# Patient Record
Sex: FEMALE | Race: OTHER | HISPANIC OR LATINO | URBAN - METROPOLITAN AREA
[De-identification: names, ages, dates, MRNs, and addresses within clinical notes are randomized per-mention and may not be internally consistent; named-entity substitution may affect disease eponyms.]

---

## 2018-07-21 ENCOUNTER — EMERGENCY (EMERGENCY)
Facility: HOSPITAL | Age: 38
LOS: 1 days | Discharge: HOME | End: 2018-07-21
Attending: EMERGENCY MEDICINE
Payer: MEDICAID

## 2018-07-21 VITALS
DIASTOLIC BLOOD PRESSURE: 67 MMHG | TEMPERATURE: 98 F | RESPIRATION RATE: 18 BRPM | HEART RATE: 78 BPM | SYSTOLIC BLOOD PRESSURE: 110 MMHG | OXYGEN SATURATION: 100 %

## 2018-07-21 VITALS
RESPIRATION RATE: 16 BRPM | HEIGHT: 62 IN | OXYGEN SATURATION: 98 % | DIASTOLIC BLOOD PRESSURE: 78 MMHG | HEART RATE: 78 BPM | WEIGHT: 145.06 LBS | TEMPERATURE: 98 F | SYSTOLIC BLOOD PRESSURE: 104 MMHG

## 2018-07-21 LAB
ALBUMIN SERPL ELPH-MCNC: 3.6 G/DL — SIGNIFICANT CHANGE UP (ref 3.5–5)
ALP SERPL-CCNC: 69 U/L — SIGNIFICANT CHANGE UP (ref 40–120)
ALT FLD-CCNC: 28 U/L DA — SIGNIFICANT CHANGE UP (ref 10–60)
ANION GAP SERPL CALC-SCNC: 6 MMOL/L — SIGNIFICANT CHANGE UP (ref 5–17)
AST SERPL-CCNC: 18 U/L — SIGNIFICANT CHANGE UP (ref 10–40)
BILIRUB SERPL-MCNC: 1.3 MG/DL — HIGH (ref 0.2–1.2)
BUN SERPL-MCNC: 15 MG/DL — SIGNIFICANT CHANGE UP (ref 7–18)
CALCIUM SERPL-MCNC: 8.9 MG/DL — SIGNIFICANT CHANGE UP (ref 8.4–10.5)
CHLORIDE SERPL-SCNC: 108 MMOL/L — SIGNIFICANT CHANGE UP (ref 96–108)
CO2 SERPL-SCNC: 26 MMOL/L — SIGNIFICANT CHANGE UP (ref 22–31)
CREAT SERPL-MCNC: 0.73 MG/DL — SIGNIFICANT CHANGE UP (ref 0.5–1.3)
GLUCOSE SERPL-MCNC: 90 MG/DL — SIGNIFICANT CHANGE UP (ref 70–99)
HCT VFR BLD CALC: 43.6 % — SIGNIFICANT CHANGE UP (ref 34.5–45)
HGB BLD-MCNC: 14.4 G/DL — SIGNIFICANT CHANGE UP (ref 11.5–15.5)
MCHC RBC-ENTMCNC: 30.6 PG — SIGNIFICANT CHANGE UP (ref 27–34)
MCHC RBC-ENTMCNC: 33 GM/DL — SIGNIFICANT CHANGE UP (ref 32–36)
MCV RBC AUTO: 92.5 FL — SIGNIFICANT CHANGE UP (ref 80–100)
PLATELET # BLD AUTO: 218 K/UL — SIGNIFICANT CHANGE UP (ref 150–400)
POTASSIUM SERPL-MCNC: 4.5 MMOL/L — SIGNIFICANT CHANGE UP (ref 3.5–5.3)
POTASSIUM SERPL-SCNC: 4.5 MMOL/L — SIGNIFICANT CHANGE UP (ref 3.5–5.3)
PROT SERPL-MCNC: 7.3 G/DL — SIGNIFICANT CHANGE UP (ref 6–8.3)
RBC # BLD: 4.72 M/UL — SIGNIFICANT CHANGE UP (ref 3.8–5.2)
RBC # FLD: 11.9 % — SIGNIFICANT CHANGE UP (ref 10.3–14.5)
SODIUM SERPL-SCNC: 140 MMOL/L — SIGNIFICANT CHANGE UP (ref 135–145)
WBC # BLD: 7.1 K/UL — SIGNIFICANT CHANGE UP (ref 3.8–10.5)
WBC # FLD AUTO: 7.1 K/UL — SIGNIFICANT CHANGE UP (ref 3.8–10.5)

## 2018-07-21 PROCEDURE — 80053 COMPREHEN METABOLIC PANEL: CPT

## 2018-07-21 PROCEDURE — 99284 EMERGENCY DEPT VISIT MOD MDM: CPT | Mod: 25

## 2018-07-21 PROCEDURE — 99284 EMERGENCY DEPT VISIT MOD MDM: CPT

## 2018-07-21 PROCEDURE — 70480 CT ORBIT/EAR/FOSSA W/O DYE: CPT | Mod: 26

## 2018-07-21 PROCEDURE — 70480 CT ORBIT/EAR/FOSSA W/O DYE: CPT

## 2018-07-21 PROCEDURE — 85027 COMPLETE CBC AUTOMATED: CPT

## 2018-07-21 PROCEDURE — 36415 COLL VENOUS BLD VENIPUNCTURE: CPT

## 2018-07-21 RX ORDER — KETOTIFEN FUMARATE 0.34 MG/ML
2 SOLUTION OPHTHALMIC
Qty: 0 | Refills: 0 | Status: DISCONTINUED | OUTPATIENT
Start: 2018-07-21 | End: 2018-07-25

## 2018-07-21 RX ORDER — ERYTHROMYCIN BASE 5 MG/GRAM
1 OINTMENT (GRAM) OPHTHALMIC (EYE) ONCE
Qty: 0 | Refills: 0 | Status: COMPLETED | OUTPATIENT
Start: 2018-07-21 | End: 2018-07-21

## 2018-07-21 RX ORDER — OFLOXACIN 0.3 %
1 DROPS OPHTHALMIC (EYE)
Qty: 1 | Refills: 0 | OUTPATIENT
Start: 2018-07-21 | End: 2018-07-25

## 2018-07-21 RX ADMIN — Medication 1 APPLICATION(S): at 11:21

## 2018-07-21 NOTE — ED PROVIDER NOTE - MEDICAL DECISION MAKING DETAILS
will treat for conjunctivitis and refer to outpatient ophthalmology appointment in next 3 days for follow up/continuity

## 2018-07-21 NOTE — ED PROVIDER NOTE - OBJECTIVE STATEMENT
note being written on behalf of Dr. Villa, as per her dictation   37 yo female presents with redeye x 3 days. She states she has red eye and crust since this morning. She denies changes in vision or double vision. She has no foreign body or pain with moving her eyes and no trauma. She denies fever. She states the symptoms are improving gradually but she just wants to make sure it is nothing to be concerned about.

## 2023-12-04 ENCOUNTER — EMERGENCY (EMERGENCY)
Facility: HOSPITAL | Age: 43
LOS: 1 days | Discharge: HOME | End: 2023-12-04
Attending: EMERGENCY MEDICINE
Payer: MEDICAID

## 2023-12-04 VITALS
SYSTOLIC BLOOD PRESSURE: 114 MMHG | WEIGHT: 165.35 LBS | HEIGHT: 61.42 IN | TEMPERATURE: 98 F | RESPIRATION RATE: 18 BRPM | OXYGEN SATURATION: 98 % | HEART RATE: 88 BPM | DIASTOLIC BLOOD PRESSURE: 79 MMHG

## 2023-12-04 PROCEDURE — 99284 EMERGENCY DEPT VISIT MOD MDM: CPT

## 2023-12-04 RX ORDER — OXYCODONE AND ACETAMINOPHEN 5; 325 MG/1; MG/1
1 TABLET ORAL ONCE
Refills: 0 | Status: DISCONTINUED | OUTPATIENT
Start: 2023-12-04 | End: 2023-12-04

## 2023-12-04 RX ORDER — IBUPROFEN 200 MG
600 TABLET ORAL ONCE
Refills: 0 | Status: COMPLETED | OUTPATIENT
Start: 2023-12-04 | End: 2023-12-04

## 2023-12-04 RX ORDER — LIDOCAINE 4 G/100G
1 CREAM TOPICAL ONCE
Refills: 0 | Status: COMPLETED | OUTPATIENT
Start: 2023-12-04 | End: 2023-12-04

## 2023-12-05 LAB
HCG UR QL: NEGATIVE — SIGNIFICANT CHANGE UP
HCG UR QL: NEGATIVE — SIGNIFICANT CHANGE UP

## 2023-12-05 PROCEDURE — 99283 EMERGENCY DEPT VISIT LOW MDM: CPT | Mod: 25

## 2023-12-05 PROCEDURE — 71046 X-RAY EXAM CHEST 2 VIEWS: CPT | Mod: 26

## 2023-12-05 PROCEDURE — 81025 URINE PREGNANCY TEST: CPT

## 2023-12-05 PROCEDURE — 71046 X-RAY EXAM CHEST 2 VIEWS: CPT

## 2023-12-05 RX ADMIN — Medication 600 MILLIGRAM(S): at 01:00

## 2023-12-05 RX ADMIN — LIDOCAINE 1 PATCH: 4 CREAM TOPICAL at 00:55

## 2023-12-05 RX ADMIN — Medication 600 MILLIGRAM(S): at 00:55

## 2023-12-05 NOTE — ED PROVIDER NOTE - CLINICAL SUMMARY MEDICAL DECISION MAKING FREE TEXT BOX
43-year-old female with right-sided rib pain status post slip and fall 3 days ago  Not hypoxic, afebrile  Well-appearing, mild right lower rib tenderness without crepitus, bilateral breath sounds present  Abdomen soft nontender    History and exam consistent with contusion  No fractures on x-ray  I am not concerned about any traumatic hemorrhage to patient's left lungs there is no trauma there and the mechanism is relatively low force  Supportive care and DC with outpatient follow-up  Discussed indication for patient return to ED.  Patient understood.

## 2023-12-05 NOTE — ED PROVIDER NOTE - PHYSICAL EXAMINATION
GENERAL: well appearing, no acute distress   HEAD: atraumatic   EYES: EOMI   ENT: moist oral mucosa   CARDIAC: regular rate; mild R lower anterior R ttp w/o crepitus   RESPIRATORY: no increased work of breathing, b/l BS present  MUSCULOSKELETAL: no deformity   NEUROLOGICAL: alert, spontaneous movement of extremities   SKIN: no visible rash  PSYCHIATRIC: cooperative

## 2023-12-05 NOTE — ED PROVIDER NOTE - PATIENT PORTAL LINK FT
You can access the FollowMyHealth Patient Portal offered by [facility] by registering at the following website: [email or web address]. By joining [employer]’s FollowMyHealth portal, you will also be able to view your health information using other applications (apps) compatible with our system. You can access the FollowMyHealth Patient Portal offered by [facility] by registering at the following website: [email or web address]. By joining [employer]’s FollowMyHealth portal, you will also be able to view your health information using other applications (apps) compatible with our system.

## 2023-12-05 NOTE — ED PROVIDER NOTE - OBJECTIVE STATEMENT
43-year-old female past medical history of thyroid disease presents for evaluation of possibly abnormal chest x-ray at urgent care just prior to arrival.  Patient states she fell in the bathtub 3 days ago and landed on her right side and has been having right rib pain.  The x-ray urgent care reportedly showed an infiltrate on the left side and the PA was concerned about possible traumatic hemorrhage.  Denies other acute complaints.  Translation via family at bedside.

## 2024-07-03 ENCOUNTER — INPATIENT (INPATIENT)
Facility: HOSPITAL | Age: 44
LOS: 13 days | Discharge: HOME | DRG: 645 | End: 2024-07-17
Attending: NEUROLOGICAL SURGERY | Admitting: NEUROLOGICAL SURGERY
Payer: COMMERCIAL

## 2024-07-03 ENCOUNTER — EMERGENCY (EMERGENCY)
Facility: HOSPITAL | Age: 44
LOS: 1 days | Discharge: HOME | End: 2024-07-03
Attending: EMERGENCY MEDICINE
Payer: MEDICAID

## 2024-07-03 VITALS
HEIGHT: 65 IN | WEIGHT: 169.76 LBS | RESPIRATION RATE: 18 BRPM | TEMPERATURE: 98 F | SYSTOLIC BLOOD PRESSURE: 119 MMHG | DIASTOLIC BLOOD PRESSURE: 80 MMHG | OXYGEN SATURATION: 96 % | HEART RATE: 83 BPM

## 2024-07-03 VITALS
TEMPERATURE: 98 F | OXYGEN SATURATION: 98 % | RESPIRATION RATE: 16 BRPM | HEART RATE: 58 BPM | SYSTOLIC BLOOD PRESSURE: 128 MMHG | DIASTOLIC BLOOD PRESSURE: 77 MMHG

## 2024-07-03 LAB
ALBUMIN SERPL ELPH-MCNC: 3.9 G/DL — SIGNIFICANT CHANGE UP (ref 3.5–5)
ALP SERPL-CCNC: 105 U/L — SIGNIFICANT CHANGE UP (ref 40–120)
ALT FLD-CCNC: 86 U/L DA — HIGH (ref 10–60)
ANION GAP SERPL CALC-SCNC: 2 MMOL/L — LOW (ref 5–17)
APTT BLD: 30 SEC — SIGNIFICANT CHANGE UP (ref 24.5–35.6)
AST SERPL-CCNC: 43 U/L — HIGH (ref 10–40)
BASOPHILS # BLD AUTO: 0.08 K/UL — SIGNIFICANT CHANGE UP (ref 0–0.2)
BASOPHILS NFR BLD AUTO: 1 % — SIGNIFICANT CHANGE UP (ref 0–2)
BILIRUB SERPL-MCNC: 0.7 MG/DL — SIGNIFICANT CHANGE UP (ref 0.2–1.2)
BUN SERPL-MCNC: 21 MG/DL — HIGH (ref 7–18)
CALCIUM SERPL-MCNC: 9.2 MG/DL — SIGNIFICANT CHANGE UP (ref 8.4–10.5)
CHLORIDE SERPL-SCNC: 111 MMOL/L — HIGH (ref 96–108)
CO2 SERPL-SCNC: 29 MMOL/L — SIGNIFICANT CHANGE UP (ref 22–31)
CREAT SERPL-MCNC: 1.1 MG/DL — SIGNIFICANT CHANGE UP (ref 0.5–1.3)
EGFR: 63 ML/MIN/1.73M2 — SIGNIFICANT CHANGE UP
EGFR: 63 ML/MIN/1.73M2 — SIGNIFICANT CHANGE UP
EOSINOPHIL # BLD AUTO: 0.64 K/UL — HIGH (ref 0–0.5)
EOSINOPHIL NFR BLD AUTO: 8.2 % — HIGH (ref 0–6)
GLUCOSE SERPL-MCNC: 93 MG/DL — SIGNIFICANT CHANGE UP (ref 70–99)
HCG SERPL-ACNC: <1 MIU/ML — SIGNIFICANT CHANGE UP
HCT VFR BLD CALC: 41.4 % — SIGNIFICANT CHANGE UP (ref 34.5–45)
HGB BLD-MCNC: 13.7 G/DL — SIGNIFICANT CHANGE UP (ref 11.5–15.5)
IMM GRANULOCYTES NFR BLD AUTO: 0.3 % — SIGNIFICANT CHANGE UP (ref 0–0.9)
INR BLD: 0.86 RATIO — SIGNIFICANT CHANGE UP (ref 0.85–1.18)
LYMPHOCYTES # BLD AUTO: 2.78 K/UL — SIGNIFICANT CHANGE UP (ref 1–3.3)
LYMPHOCYTES # BLD AUTO: 35.5 % — SIGNIFICANT CHANGE UP (ref 13–44)
MAGNESIUM SERPL-MCNC: 2.1 MG/DL — SIGNIFICANT CHANGE UP (ref 1.6–2.6)
MCHC RBC-ENTMCNC: 29.5 PG — SIGNIFICANT CHANGE UP (ref 27–34)
MCHC RBC-ENTMCNC: 33.1 GM/DL — SIGNIFICANT CHANGE UP (ref 32–36)
MCV RBC AUTO: 89.2 FL — SIGNIFICANT CHANGE UP (ref 80–100)
MONOCYTES # BLD AUTO: 0.81 K/UL — SIGNIFICANT CHANGE UP (ref 0–0.9)
MONOCYTES NFR BLD AUTO: 10.3 % — SIGNIFICANT CHANGE UP (ref 2–14)
NEUTROPHILS # BLD AUTO: 3.51 K/UL — SIGNIFICANT CHANGE UP (ref 1.8–7.4)
NEUTROPHILS NFR BLD AUTO: 44.7 % — SIGNIFICANT CHANGE UP (ref 43–77)
NRBC # BLD: 0 /100 WBCS — SIGNIFICANT CHANGE UP (ref 0–0)
NRBC BLD-RTO: 0 /100 WBCS — SIGNIFICANT CHANGE UP (ref 0–0)
PLATELET # BLD AUTO: 268 K/UL — SIGNIFICANT CHANGE UP (ref 150–400)
POTASSIUM SERPL-MCNC: 3.7 MMOL/L — SIGNIFICANT CHANGE UP (ref 3.5–5.3)
POTASSIUM SERPL-SCNC: 3.7 MMOL/L — SIGNIFICANT CHANGE UP (ref 3.5–5.3)
PROT SERPL-MCNC: 7.6 G/DL — SIGNIFICANT CHANGE UP (ref 6–8.3)
PROTHROM AB SERPL-ACNC: 9.8 SEC — SIGNIFICANT CHANGE UP (ref 9.5–13)
RBC # BLD: 4.64 M/UL — SIGNIFICANT CHANGE UP (ref 3.8–5.2)
RBC # FLD: 13.7 % — SIGNIFICANT CHANGE UP (ref 10.3–14.5)
SODIUM SERPL-SCNC: 142 MMOL/L — SIGNIFICANT CHANGE UP (ref 135–145)
WBC # BLD: 7.84 K/UL — SIGNIFICANT CHANGE UP (ref 3.8–10.5)
WBC # FLD AUTO: 7.84 K/UL — SIGNIFICANT CHANGE UP (ref 3.8–10.5)

## 2024-07-03 PROCEDURE — 99285 EMERGENCY DEPT VISIT HI MDM: CPT

## 2024-07-03 PROCEDURE — 85025 COMPLETE CBC W/AUTO DIFF WBC: CPT

## 2024-07-03 PROCEDURE — 83735 ASSAY OF MAGNESIUM: CPT

## 2024-07-03 PROCEDURE — 85730 THROMBOPLASTIN TIME PARTIAL: CPT

## 2024-07-03 PROCEDURE — 84702 CHORIONIC GONADOTROPIN TEST: CPT

## 2024-07-03 PROCEDURE — 70450 CT HEAD/BRAIN W/O DYE: CPT | Mod: MC

## 2024-07-03 PROCEDURE — 36415 COLL VENOUS BLD VENIPUNCTURE: CPT

## 2024-07-03 PROCEDURE — 85610 PROTHROMBIN TIME: CPT

## 2024-07-03 PROCEDURE — 70450 CT HEAD/BRAIN W/O DYE: CPT | Mod: 26,MC

## 2024-07-03 PROCEDURE — 99285 EMERGENCY DEPT VISIT HI MDM: CPT | Mod: 25

## 2024-07-03 PROCEDURE — 80053 COMPREHEN METABOLIC PANEL: CPT

## 2024-07-03 RX ORDER — TETRACAINE HYDROCHLORIDE 5 MG/ML
1 SOLUTION OPHTHALMIC ONCE
Refills: 0 | Status: COMPLETED | OUTPATIENT
Start: 2024-07-03 | End: 2024-07-03

## 2024-07-03 NOTE — ED PROVIDER NOTE - CLINICAL SUMMARY MEDICAL DECISION MAKING FREE TEXT BOX
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     Patient transferred from [facility], NSGY and optho following. In the ED labs were ordered including CBC,CMP, Mg/phosph. Although size and presentation consistent with macroadenoma, still sent pituitary hormone labs iso symptoms (night sweats, palpitations, anxiety). Patient with family history of brain and thyroid issues, need to consider MEN1 and potentially consult endocrinology once labs back/admitted. 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     Patient transferred from [facility], NSGY and optho following. In the ED labs were ordered including CBC,CMP, Mg/phosph. Although size and presentation consistent with macroadenoma, still sent pituitary hormone labs iso symptoms (night sweats, palpitations, anxiety). Patient with family history of brain and thyroid issues, need to consider MEN1 and potentially consult endocrinology once labs back/admitted (lipase ordered to r/o pancreatic concerns).

## 2024-07-03 NOTE — ED ADULT NURSE NOTE - OBJECTIVE STATEMENT
43y/o Female BIBEMS transfer from [place] presents to ED c/o L sided vision change x5days and Headache x2days. EMS reports CT in previous hospital reveals a mass compressing the optic nerve. Pt states seeeing shadows and figures. Pt endorses L eye pain 2/10 at time of assessment. PMHx HLD . Pt denies f/c/n/v/d, chest pain, SOB, abdominal pain. Pt transferred for neurosurgery consult. Pt is Telugu speaking with daughter at bedside for translation and providing hx. Pt presents with 20G angiocatheter in the LAC. Pt is AxOx4. Airway is patent, breathing shallow and unlabored. Skin is warm, dry, color appropriate for ethnicity. Neuro check performed, see flowsheet. Pt demonstrates continence, is ambulatory unassisted. Pt states NKDA Stretcher locked and in lowest position, appropriate side rails up. Pt instructed to notify RN if assistance is needed.

## 2024-07-03 NOTE — ED PROVIDER NOTE - PHYSICAL EXAMINATION
GENERAL: Well appearing, not in any distress  HEAD: Atruamatic, no gross findings  EYES: Appropriate eye movements, left sided vision difficulties   NECK: NA  HEART: Normal R/R, No pathological heart sounds  LUNGS: Moves air well, clear lung fields bilaterally, no pathological breath sounds  ABDOMEN: +BS, non tender, non distended  EXTREMITIES: +1 LE edema   NEURO: AO3, CN appropriate  PSYCH: Appropriate affect  SKIN: No pathological skin findings

## 2024-07-03 NOTE — CONSULT NOTE ADULT - ASSESSMENT
45 yo female with pituitary mass and left optic nerve dysfunction.     Pituitary mass   - with headaches and blurry vision worsening over past month. Vision has acutely exacerbated over past two days   - Va ph 20/40 OD, 20/400 OS, there is an APD OS, HILARIA color plates OS due to poor vision, visual fields to confrontation are full OU   - Exam with ***  - Optic nerve dysfunction is likely secondary to enlargement of pituitary mass with subsequent impingement of left optic nerve leading to left eye vision loss   - Recommend MRI brain/orbits w/wo contrast  - recommend neurosurgery evaluation    Outpatient follow-up: Patient should follow-up with his/her ophthalmologist or with [facility] Department of Ophthalmology upon discharge at the address below     [facility] Department of Ophthalmology  [address] [place] [address]. [address]  [place] [address]  [phone]   45 yo female with pituitary mass and left optic nerve dysfunction.     Pituitary mass   - with headaches and blurry vision worsening over past month. Vision has acutely exacerbated over past two days   - Va ph 20/40 OD, 20/400 OS, there is an APD OS, HILARIA color plates OS due to poor vision, visual fields to confrontation are full OU   - Exam with normal appearing optic nerves and retina. No nerve edema or pallor.   - Patient with optic nerve dysfunction as reflected by poor vision and APD, and it is likely secondary to enlargement of pituitary mass with subsequent impingement of left optic nerve leading to left eye vision loss   - Recommend MRI brain/orbits w/wo contrast  - recommend neurosurgery evaluation  - Recommend endocrine consult in AM as patient endorses family hx of brain cancer, stomach cancer suspicious for MEN1     Outpatient follow-up: Patient should follow-up with his/her ophthalmologist or with [facility] Department of Ophthalmology upon discharge at the address below     [facility] Department of Ophthalmology  [address] [place] [address]. [address]  [place] [address]  [phone]   43 yo female with pituitary mass and left optic nerve dysfunction.     Pituitary mass, left optic nerve dysfunction   - with headaches and blurry vision worsening over past month. Vision has acutely exacerbated over past two days   - Va ph 20/40 OD, 20/400 OS, there is an APD OS, HILARIA color plates OS due to poor vision, visual fields to confrontation are full OU   - Exam with normal appearing optic nerves and retina. No nerve edema or pallor.   - Patient with optic nerve dysfunction as reflected by poor vision and APD, and it is likely secondary to enlargement of pituitary mass with subsequent impingement of left optic nerve leading to left eye vision loss   - Recommend MRI brain/orbits w/wo contrast  - recommend neurosurgery evaluation  - Recommend endocrine consult in AM as patient endorses family hx of brain cancer, stomach cancer suspicious for MEN1     Outpatient follow-up: Patient should follow-up with his/her ophthalmologist or with Health system Department of Ophthalmology upon discharge at the address below     Health system Department of Ophthalmology  [address] [place] [address]. [address]  [place] [address]  [phone]

## 2024-07-03 NOTE — ED PROVIDER NOTE - OBJECTIVE STATEMENT
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     Patient reported in the last two days she has had decreased left eye vision. She saw an outpatient ophthalmologist who noticed a white mass towards her pupil, she was told it was pterygium. In additon, to vision difficulties she endorsed left sided headaches. 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     Patient reported in the last two days she has had decreased left eye vision. She saw an outpatient ophthalmologist who noticed a white mass towards her pupil, she was told it was pterygium. In additon, to vision difficulties she endorsed left sided headaches. Other symptoms she endorses includes frequent cold sweats at night, palpitations, occasional mallaise. She was in the middle of working up these symptoms with her cardiologist Dr. Milvia Berkowitz.     Other aspects of her history include no substance use, she lives with family, and has a history of brain tumor in her aunt (50s), stomach cancer, and diabetes. Her PCP is a Dr. Fiore.     Medications include: Atorvastatin 20mg. No allergies.

## 2024-07-03 NOTE — ED PROVIDER NOTE - NSICDXFAMILYHX_GEN_ALL_CORE_FT
FAMILY HISTORY:  FH: stomach cancer  FH: type 2 diabetes    Grandparent  Still living? Unknown  FH: thyroid disease, Age at diagnosis: Age Unknown    Aunt  Still living? No  FH: brain tumor, Age at diagnosis: Age Unknown

## 2024-07-03 NOTE — ED PROVIDER NOTE - NS ED ROS FT
They denied fevers/chills, shortness of breath/chest pain, abdomin pain, constipation/diarrehea, any issues with urination.

## 2024-07-03 NOTE — ED PROVIDER NOTE - ATTENDING CONTRIBUTION TO CARE
Attending (Santos Roblero D.O.):  I have personally seen and examined this patient. I have performed a substantive portion of the visit including all aspects of the medical decision making. Resident note reviewed. I agree on the plan of care except where noted.    Young female here as a transfer from [facility] 2/2 pituary mass 1.9cm seen on CTH, originally presented for vision loss of left eye and left sided intermittent headaches, temporally associated with mornings, ongoing x month, worse over past 2 days, w/o nausea, vomiting, gait abnl, speech abnl. + fhx of cancers. Denies fever, trauma.    Hemodynam stable, NAD, +APD L eye, full confrontational fields, nontender face, nontender orbits, IOP wnl. No temple ttp. No midline spinal ttp. No motor/sensory deficit. No drift. Steady gait. RRR, no inc wob, benign abd. No jaundice.     Hx and exam w/o concern for increased ICP at this time since transfer. Ophtho bedside. Neurosurg was consulted. hormonal labs ordered. All discussed with patient and daughter. To be admitted.

## 2024-07-03 NOTE — CONSULT NOTE ADULT - SUBJECTIVE AND OBJECTIVE BOX
[facility] DEPARTMENT OF OPHTHALMOLOGY - INITIAL ADULT CONSULT  -----------------------------------------------------------------------------  Awais Khan MD, PGY-3  Contact: TEAMS  -----------------------------------------------------------------------------    HPI:  43 yo female presenting as transfer from outside hospital with 1 month of headaches and worsening of left eye vision, found to have 1.9cm pituitary mass    PMH: none  POcHx: denies surg/laser  FH: denies glc/amd  Social History: denies etoh/tobacco  Ophthalmic Medications: none  Allergies: NKDA    Review of Systems:  Constitutional: No fever, chills  Eyes: left eye blurry vision   Neuro: headaches  Cardiovascular: No chest pain, palpitations  Respiratory: No SOB, no cough  GI: No nausea, vomiting, abdominal pain  : No dysuria  Skin: no rash  Psych: no depression  Endocrine: no polyuria, polydipsia  Heme/lymph: no swelling    VITALS: T(C): 36.6 (07-03-24 @ 23:03)  T(F): 97.8 (07-03-24 @ 23:03), Max: 97.8 (07-03-24 @ 23:03)  HR: 58 (07-03-24 @ 23:03) (58 - 58)  BP: 128/77 (07-03-24 @ 23:03) (128/77 - 128/77)  RR:  (16 - 16)  SpO2:  (98% - 98%)  Wt(kg): --  General: AAO x 3, appropriate mood and affect    Ophthalmology Exam:  Visual acuity (sc): 20/50 ph 20/40 OD, 20/400 -1 phni OS  Pupils: APD OS   Ttono: 16 OU  Extraocular movements (EOMs): Full OU, no pain, no diplopia  Confrontational Visual Field (CVF): Full OU  Color Plates: 12/12 OD, HILARIA OS     Pen Light Exam (PLE)  External: Flat OU  Lids/Lashes/Lacrimal Ducts: Flat OU    Sclera/Conjunctiva: W+Q OU  Cornea: Cl OU  Anterior Chamber: D+F OU    Iris: Flat OU  Lens: Cl OU    Fundus Exam: dilated with 1% tropicamide and 2.5% phenylephrine  Approval obtained from primary team for dilation  Patient aware that pupils can remained dilated for at least 4-6 hours  Exam performed with 20D lens    Vitreous: wnl OU  Disc, cup/disc: sharp and pink, 0.4 OU  Macula: wnl OU  Vessels: wnl OU  Periphery: wnl OU    Labs/Imaging:  ***

## 2024-07-03 NOTE — ED ADULT NURSE NOTE - NSFALLHARMRISKINTERV_ED_ALL_ED

## 2024-07-04 DIAGNOSIS — Z90.710 ACQUIRED ABSENCE OF BOTH CERVIX AND UTERUS: Chronic | ICD-10-CM

## 2024-07-04 DIAGNOSIS — E04.9 NONTOXIC GOITER, UNSPECIFIED: ICD-10-CM

## 2024-07-04 DIAGNOSIS — D35.2 BENIGN NEOPLASM OF PITUITARY GLAND: ICD-10-CM

## 2024-07-04 DIAGNOSIS — G93.89 OTHER SPECIFIED DISORDERS OF BRAIN: ICD-10-CM

## 2024-07-04 DIAGNOSIS — R79.89 OTHER SPECIFIED ABNORMAL FINDINGS OF BLOOD CHEMISTRY: ICD-10-CM

## 2024-07-04 LAB
ACTH SER-ACNC: 32 PG/ML — SIGNIFICANT CHANGE UP (ref 7.2–63.3)
ADD ON TEST-SPECIMEN IN LAB: SIGNIFICANT CHANGE UP
ADD ON TEST-SPECIMEN IN LAB: SIGNIFICANT CHANGE UP
ALBUMIN SERPL ELPH-MCNC: 4.1 G/DL — SIGNIFICANT CHANGE UP (ref 3.3–5)
ALP SERPL-CCNC: 97 U/L — SIGNIFICANT CHANGE UP (ref 40–120)
ALT FLD-CCNC: 65 U/L — HIGH (ref 10–45)
ANION GAP SERPL CALC-SCNC: 13 MMOL/L — SIGNIFICANT CHANGE UP (ref 5–17)
APTT BLD: 28.4 SEC — SIGNIFICANT CHANGE UP (ref 24.5–35.6)
AST SERPL-CCNC: 29 U/L — SIGNIFICANT CHANGE UP (ref 10–40)
BILIRUB SERPL-MCNC: 0.6 MG/DL — SIGNIFICANT CHANGE UP (ref 0.2–1.2)
BLD GP AB SCN SERPL QL: NEGATIVE — SIGNIFICANT CHANGE UP
BUN SERPL-MCNC: 21 MG/DL — SIGNIFICANT CHANGE UP (ref 7–23)
CALCIUM SERPL-MCNC: 9.5 MG/DL — SIGNIFICANT CHANGE UP (ref 8.4–10.5)
CHLORIDE SERPL-SCNC: 107 MMOL/L — SIGNIFICANT CHANGE UP (ref 96–108)
CO2 SERPL-SCNC: 23 MMOL/L — SIGNIFICANT CHANGE UP (ref 22–31)
CORTIS F PM SERPL-MCNC: 4 UG/DL — SIGNIFICANT CHANGE UP (ref 2.7–10.5)
CREAT SERPL-MCNC: 0.87 MG/DL — SIGNIFICANT CHANGE UP (ref 0.5–1.3)
EGFR: 83 ML/MIN/1.73M2 — SIGNIFICANT CHANGE UP
ESTRADIOL FREE SERPL-MCNC: 36 PG/ML — SIGNIFICANT CHANGE UP
FSH SERPL-MCNC: 9.4 IU/L — SIGNIFICANT CHANGE UP
GLUCOSE SERPL-MCNC: 106 MG/DL — HIGH (ref 70–99)
HCT VFR BLD CALC: 39.8 % — SIGNIFICANT CHANGE UP (ref 34.5–45)
HGB BLD-MCNC: 13.3 G/DL — SIGNIFICANT CHANGE UP (ref 11.5–15.5)
INR BLD: 0.88 RATIO — SIGNIFICANT CHANGE UP (ref 0.85–1.18)
LH SERPL-ACNC: 7.4 IU/L — SIGNIFICANT CHANGE UP
MAGNESIUM SERPL-MCNC: 2 MG/DL — SIGNIFICANT CHANGE UP (ref 1.6–2.6)
MCHC RBC-ENTMCNC: 30.3 PG — SIGNIFICANT CHANGE UP (ref 27–34)
MCHC RBC-ENTMCNC: 33.4 GM/DL — SIGNIFICANT CHANGE UP (ref 32–36)
MCV RBC AUTO: 90.7 FL — SIGNIFICANT CHANGE UP (ref 80–100)
MRSA PCR RESULT.: SIGNIFICANT CHANGE UP
NRBC # BLD: 0 /100 WBCS — SIGNIFICANT CHANGE UP (ref 0–0)
PHOSPHATE SERPL-MCNC: 4.5 MG/DL — SIGNIFICANT CHANGE UP (ref 2.5–4.5)
PLATELET # BLD AUTO: 259 K/UL — SIGNIFICANT CHANGE UP (ref 150–400)
POTASSIUM SERPL-MCNC: 3.8 MMOL/L — SIGNIFICANT CHANGE UP (ref 3.5–5.3)
POTASSIUM SERPL-SCNC: 3.8 MMOL/L — SIGNIFICANT CHANGE UP (ref 3.5–5.3)
PROLACTIN SERPL-MCNC: 108 NG/ML — HIGH (ref 3.4–24.1)
PROLACTIN SERPL-MCNC: 112 NG/ML — HIGH (ref 3.4–24.1)
PROLACTIN SERPL-MCNC: 153 NG/ML — HIGH (ref 3.4–24.1)
PROT SERPL-MCNC: 7.2 G/DL — SIGNIFICANT CHANGE UP (ref 6–8.3)
PROTHROM AB SERPL-ACNC: 9.7 SEC — SIGNIFICANT CHANGE UP (ref 9.5–13)
RBC # BLD: 4.39 M/UL — SIGNIFICANT CHANGE UP (ref 3.8–5.2)
RBC # FLD: 13.6 % — SIGNIFICANT CHANGE UP (ref 10.3–14.5)
RH IG SCN BLD-IMP: POSITIVE — SIGNIFICANT CHANGE UP
S AUREUS DNA NOSE QL NAA+PROBE: DETECTED
SODIUM SERPL-SCNC: 143 MMOL/L — SIGNIFICANT CHANGE UP (ref 135–145)
T4 AB SER-ACNC: 6.8 UG/DL — SIGNIFICANT CHANGE UP (ref 4.6–12)
T4 FREE SERPL-MCNC: 1 NG/DL — SIGNIFICANT CHANGE UP (ref 0.9–1.8)
TSH SERPL-MCNC: 6.4 UIU/ML — HIGH (ref 0.27–4.2)
WBC # BLD: 6.32 K/UL — SIGNIFICANT CHANGE UP (ref 3.8–10.5)
WBC # FLD AUTO: 6.32 K/UL — SIGNIFICANT CHANGE UP (ref 3.8–10.5)

## 2024-07-04 PROCEDURE — 70553 MRI BRAIN STEM W/O & W/DYE: CPT | Mod: 26

## 2024-07-04 PROCEDURE — 70543 MRI ORBT/FAC/NCK W/O &W/DYE: CPT | Mod: 26

## 2024-07-04 PROCEDURE — 99222 1ST HOSP IP/OBS MODERATE 55: CPT | Mod: GC

## 2024-07-04 PROCEDURE — 99232 SBSQ HOSP IP/OBS MODERATE 35: CPT | Mod: 57

## 2024-07-04 PROCEDURE — 99223 1ST HOSP IP/OBS HIGH 75: CPT | Mod: GC

## 2024-07-04 PROCEDURE — 93970 EXTREMITY STUDY: CPT | Mod: 26

## 2024-07-04 RX ORDER — SODIUM CHLORIDE 0.9 % (FLUSH) 0.9 %
1000 SYRINGE (ML) INJECTION
Refills: 0 | Status: DISCONTINUED | OUTPATIENT
Start: 2024-07-04 | End: 2024-07-04

## 2024-07-04 RX ORDER — MUPIROCIN 20 MG/G
1 OINTMENT TOPICAL EVERY 12 HOURS
Refills: 0 | Status: COMPLETED | OUTPATIENT
Start: 2024-07-04 | End: 2024-07-09

## 2024-07-04 RX ORDER — ONDANSETRON HYDROCHLORIDE 2 MG/ML
4 INJECTION INTRAMUSCULAR; INTRAVENOUS EVERY 6 HOURS
Refills: 0 | Status: DISCONTINUED | OUTPATIENT
Start: 2024-07-04 | End: 2024-07-11

## 2024-07-04 RX ORDER — OXYCODONE HYDROCHLORIDE 100 MG/5ML
5 SOLUTION ORAL EVERY 4 HOURS
Refills: 0 | Status: DISCONTINUED | OUTPATIENT
Start: 2024-07-04 | End: 2024-07-11

## 2024-07-04 RX ORDER — OXYCODONE HYDROCHLORIDE 100 MG/5ML
10 SOLUTION ORAL EVERY 4 HOURS
Refills: 0 | Status: DISCONTINUED | OUTPATIENT
Start: 2024-07-04 | End: 2024-07-11

## 2024-07-04 RX ORDER — ACETAMINOPHEN 325 MG
1000 TABLET ORAL EVERY 6 HOURS
Refills: 0 | Status: DISCONTINUED | OUTPATIENT
Start: 2024-07-04 | End: 2024-07-04

## 2024-07-04 RX ORDER — SENNOSIDES 8.6 MG
2 TABLET ORAL AT BEDTIME
Refills: 0 | Status: DISCONTINUED | OUTPATIENT
Start: 2024-07-04 | End: 2024-07-11

## 2024-07-04 RX ORDER — ATORVASTATIN CALCIUM 20 MG/1
20 TABLET, FILM COATED ORAL AT BEDTIME
Refills: 0 | Status: DISCONTINUED | OUTPATIENT
Start: 2024-07-04 | End: 2024-07-11

## 2024-07-04 RX ORDER — DEXAMETHASONE 1 MG/1
4 TABLET ORAL EVERY 6 HOURS
Refills: 0 | Status: DISCONTINUED | OUTPATIENT
Start: 2024-07-04 | End: 2024-07-11

## 2024-07-04 RX ORDER — ENOXAPARIN SODIUM 100 MG/ML
40 INJECTION SUBCUTANEOUS
Refills: 0 | Status: DISCONTINUED | OUTPATIENT
Start: 2024-07-04 | End: 2024-07-06

## 2024-07-04 RX ORDER — ACETAMINOPHEN 325 MG
650 TABLET ORAL EVERY 6 HOURS
Refills: 0 | Status: DISCONTINUED | OUTPATIENT
Start: 2024-07-04 | End: 2024-07-11

## 2024-07-04 RX ORDER — POLYETHYLENE GLYCOL 3350 1 G/G
17 POWDER ORAL DAILY
Refills: 0 | Status: DISCONTINUED | OUTPATIENT
Start: 2024-07-04 | End: 2024-07-08

## 2024-07-04 RX ORDER — PANTOPRAZOLE SODIUM 40 MG/10ML
40 INJECTION, POWDER, FOR SOLUTION INTRAVENOUS DAILY
Refills: 0 | Status: DISCONTINUED | OUTPATIENT
Start: 2024-07-04 | End: 2024-07-08

## 2024-07-04 RX ADMIN — POLYETHYLENE GLYCOL 3350 17 GRAM(S): 1 POWDER ORAL at 11:55

## 2024-07-04 RX ADMIN — DEXAMETHASONE 4 MILLIGRAM(S): 1 TABLET ORAL at 11:55

## 2024-07-04 RX ADMIN — DEXAMETHASONE 4 MILLIGRAM(S): 1 TABLET ORAL at 17:56

## 2024-07-04 RX ADMIN — PANTOPRAZOLE SODIUM 40 MILLIGRAM(S): 40 INJECTION, POWDER, FOR SOLUTION INTRAVENOUS at 11:55

## 2024-07-04 RX ADMIN — MUPIROCIN 1 APPLICATION(S): 20 OINTMENT TOPICAL at 23:25

## 2024-07-04 RX ADMIN — DEXAMETHASONE 4 MILLIGRAM(S): 1 TABLET ORAL at 23:25

## 2024-07-04 RX ADMIN — Medication 75 MILLILITER(S): at 05:19

## 2024-07-04 RX ADMIN — DEXAMETHASONE 4 MILLIGRAM(S): 1 TABLET ORAL at 05:19

## 2024-07-04 NOTE — CONSULT NOTE ADULT - PROBLEM SELECTOR RECOMMENDATION 9
Preop for resection of pituitary adenoma today with neurosurgery  ENT will continue to follow  Call with questions or concerns x [phone]

## 2024-07-04 NOTE — CONSULT NOTE ADULT - SUBJECTIVE AND OBJECTIVE BOX
NOTE INCOMPLETE/ IN PROGRESS  *Please wait for attending attestation for official recommendations.     HPI:  44F Grenadian speaking with hx of thyroid goiter, HLD ([facility] xfer) p/f progressive HAs/L eye blurry vision/short term mem loss x1mo, w/ acutely worse Lt side HA x2d. At 11AM yesterday suddenly could only see figures/shadows from Lt eye. Optho noted Lt optic nn. dysfxn/no papilledema. 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 (04 Jul 2024 02:28)    Consulted for: Sellar mass, possibly pituitary adenoma    Patient declined  as she preferred her daughter to translate    Endocrine history:  Started having progressive headache and L vision deficits about 1 month ago. Yesterday had significant vision loss in L eye which prompted her to come to the emergency room. She also notes some palpitations and cold sweats overnight in the past week.  She denies fatigue, temperature intolerance, n/v/d, constipation, lightheadedness or dizziness, galactorrhea, breast fullness.  Has hx of hysterectomy.    She has thyroid goiter for which she follows up with Endocrinologist Dr. Espino ([facility]) since 2021. Only been monitoring with US.  Her recent TFT's from 6/24/24 showed TSH of 3.43, FT4 of 0.9 outpatient.  Family hx: Mother with goiter s/p total thyroidectomy, hypothyroidism.    Preop pituitary workup:  - Prolactin 153, diluted 108.  - TSH 6.40 mildly elevated, Total T4 6.8  - 2am cortisol was 4.0  - Estradiol 36, LH 7.4, FSH 9.4      PAST MEDICAL & SURGICAL HISTORY:  Hyperlipidemia      H/O: hysterectomy          FAMILY HISTORY:  FH: brain tumor (Aunt)    FH: stomach cancer    FH: type 2 diabetes    Family hx: Mother with goiter s/p total thyroidectomy, hypothyroidism.        Social History:  No smoking  No alcohol      Outpatient Medications:  Home Medications:  Lipitor 20 mg oral tablet: 1 tab(s) orally once a day (at bedtime) (04 Jul 2024 02:40)    MEDICATIONS  (STANDING):  atorvastatin 20 milliGRAM(s) Oral at bedtime  dexAMETHasone  Injectable 4 milliGRAM(s) IV Push every 6 hours  enoxaparin Injectable 40 milliGRAM(s) SubCutaneous <User Schedule>  pantoprazole  Injectable 40 milliGRAM(s) IV Push daily  polyethylene glycol 3350 17 Gram(s) Oral daily  senna 2 Tablet(s) Oral at bedtime    MEDICATIONS  (PRN):  acetaminophen     Tablet .. 650 milliGRAM(s) Oral every 6 hours PRN Temp greater or equal to 38.5C (101.3F), Mild Pain (1 - 3)  ondansetron Injectable 4 milliGRAM(s) IV Push every 6 hours PRN Nausea and/or Vomiting  oxyCODONE    IR 5 milliGRAM(s) Oral every 4 hours PRN Moderate Pain (4 - 6)  oxyCODONE    IR 10 milliGRAM(s) Oral every 4 hours PRN Severe Pain (7 - 10)      Allergies    No Known Allergies    Intolerances      Review of Systems:  Constitutional: +nightsweats. No fever  Eyes: L eye vision deficit  Neuro: No tremors  HEENT: +headache  Cardiovascular: No chest pain. Occasional palpitations  Respiratory: No SOB, no cough  GI: No nausea, vomiting, abdominal pain  : No dysuria  Skin: no rash  Psych: no depression  Endocrine: no polyuria, polydipsia  Hem/lymph: no swelling  Osteoporosis: no fractures    ALL OTHER SYSTEMS REVIEWED AND NEGATIVE    PHYSICAL EXAM:  VITALS: T(C): 36.8 (07-04-24 @ 13:58)  T(F): 98.2 (07-04-24 @ 13:58), Max: 98.6 (07-04-24 @ 09:37)  HR: 73 (07-04-24 @ 13:58) (58 - 87)  BP: 121/77 (07-04-24 @ 13:58) (99/64 - 132/83)  RR:  (14 - 20)  SpO2:  (95% - 98%)  Wt(kg): --  GENERAL: NAD, well-groomed, well-developed  EYES: No proptosis, no lid lag, anicteric. L vision gross deficit  HEENT:  Atraumatic, Normocephalic, moist mucous membranes  THYROID: mildly enlarged  RESPIRATORY: Clear to auscultation bilaterally; No rales, rhonchi, wheezing  CARDIOVASCULAR: Regular rate and rhythm; No murmurs; no peripheral edema  GI: Soft, nontender, non distended  SKIN: Dry, intact, No rashes or lesions  MUSCULOSKELETAL: BHAGAT  NEURO: no tremor  PSYCH: Anxious, Alert and oriented x 3  CUSHING'S SIGNS: no striae      CAPILLARY BLOOD GLUCOSE                                13.3   6.32  )-----------( 259      ( 04 Jul 2024 00:46 )             39.8       07-04    143  |  107  |  21  ----------------------------<  106<H>  3.8   |  23  |  0.87    eGFR: 83    Ca    9.5      07-04  Mg     2.0     07-04  Phos  4.5     07-04    TPro  7.2  /  Alb  4.1  /  TBili  0.6  /  DBili  x   /  AST  29  /  ALT  65<H>  /  AlkPhos  97  07-04      Thyroid Function Tests:  07-04 @ 00:46 TSH 6.40 FreeT4 -- T3 -- Anti TPO -- Anti Thyroglobulin Ab -- TSI --              Radiology:

## 2024-07-04 NOTE — PATIENT PROFILE ADULT - FUNCTIONAL ASSESSMENT - BASIC MOBILITY 2.
[Medication Risks Reviewed] : Medication risks reviewed [de-identified] : She has recurrent symptoms of lower back pain related to underlying lumbar degenerative disc disease.  She will rest and use moist heat.  She has been started on Flexeril 10 mg 3 times a day as well as omeprazole 20 mg once a day in light of her history of heartburn.  She is also been started on ibuprofen 800 mg 3 times a day as a nonsteroidal anti-inflammatory.  She will take the ibuprofen for at least 3 to 4 days after her symptoms have fully resolved.  I will see her for follow-up in 3 weeks.  Discussed that when the symptoms have resolved she will need to visit a physical therapist to learn lower back exercises that she will do on a daily basis to decrease the future frequency and severity of these episodes. 4 = No assist / stand by assistance

## 2024-07-04 NOTE — OCCUPATIONAL THERAPY INITIAL EVALUATION ADULT - PERTINENT HX OF CURRENT PROBLEM, REHAB EVAL
44F Singaporean speaking ([facility] xfer) p/f progressive HAs/L eye blurry vision/short term mem loss x1mo, w/ acutely worse Lt side HA x2d. At 11AM yesterday suddenly could only see figures/shadows from Lt eye. Optho noted Lt optic nn. dysfxn/no papilledema. 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 Exam: Wide awake, Ox3, pupils dilated by optho, VFF to finger counting b/l but Lt eye can only see shadows/figure outlines, no drift o/w intact. (7/4) pending MR results. 44F Kittitian speaking ([facility] xfer) p/f progressive HAs/L eye blurry vision/short term mem loss x1mo, w/ acutely worse Lt side HA x2d. At 11AM yesterday suddenly could only see figures/shadows from Lt eye. Optho noted Lt optic nn. dysfxn/no papilledema. 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 Exam: Wide awake, Ox3, pupils dilated by optho, VFF to finger counting b/l but Lt eye can only see shadows/figure outlines, no drift o/w intact. (7/4) MR Brain, Orbits: Large homogeneously thin enhancing sellar mass extending into the suprasellar cistern with optic chiasm compression as well as extending into the inferior left frontal lobe extra-axially compressing the left prechiasmal optic nerve consistent with a pituitary macroadenoma. No evidence of pituitary apoplexy.

## 2024-07-04 NOTE — CONSULT NOTE ADULT - TIME BILLING
reviewing prior documentation, reviewing available recent outpatient records, independently obtaining a history and interpreting results of tests, performing a physical examination, reviewing tests/imaging, discussing the plan with the patient, counseling and educating the patient/daughter, ordering medications/tests, documenting clinical information in the electronic health record, and coordinating care.

## 2024-07-04 NOTE — H&P ADULT - ASSESSMENT
44F Algerian speaking ([facility] xfer) p/f progressive HAs/L eye blurry vision/short term mem loss x1mo, w/ acutely worse Lt side HA x2d. At 11AM yesterday suddenly could only see figures/shadows from Lt eye. Optho noted Lt optic nn. dysfxn/no papilledema. 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 Exam: Wide awake, Ox3, pupils dilated by optho, VFF to finger counting b/l but Lt eye can only see shadows/figure outlines, no drift o/w intact   -Adm 4C under Dr. Goncalves   -Dex 4q6(ACTH/cortisol drawn first)  -MRI brain w/wo pituitary protocol emergent o/n   -Pit labs sent (has also had palpitations)  -Preop for TSP for pituitary adenoma rsxn today. Consented. Will consult ENT/Juan Antonio/Jonnathan

## 2024-07-04 NOTE — H&P ADULT - NSHPPHYSICALEXAM_GEN_ALL_CORE
Exam: Wide awake, Ox3, pupils dilated by optho, VFF to finger counting b/l but Lt eye can only see shadows/figure outlines, no drift o/w intact

## 2024-07-04 NOTE — PROGRESS NOTE ADULT - SUBJECTIVE AND OBJECTIVE BOX
SUBJECTIVE:  Patient seen and examined.  Patient has a headache.  Patient states vision has been slowly getting worse in the last month but acutely worsening in last 3 days.  She does state that since starting decadron yesterday she feels her vision is improved.     Vital Signs Last 24 Hrs  T(C): 37 (04 Jul 2024 09:37), Max: 37 (04 Jul 2024 09:37)  T(F): 98.6 (04 Jul 2024 09:37), Max: 98.6 (04 Jul 2024 09:37)  HR: 82 (04 Jul 2024 10:17) (58 - 87)  BP: 130/71 (04 Jul 2024 10:17) (99/64 - 132/83)  BP(mean): 92 (04 Jul 2024 02:32) (92 - 97)  RR: 18 (04 Jul 2024 09:37) (14 - 20)  SpO2: 96% (04 Jul 2024 10:17) (95% - 98%)    Parameters below as of 04 Jul 2024 10:17  Patient On (Oxygen Delivery Method): room air        PHYSICAL EXAM:    Constitutional: No Acute Distress     Neurological: AOx3, Following Commands, Moving all Extremities left eye left upper quadrant opsia                                                  Sensation: [x] intact to light touch  [] decreased:     Pulmonary: Clear to Auscultation, No rales, No rhonchi, No wheezes     Cardiovascular: S1, S2, Regular rate and rhythm     Gastrointestinal: Soft, Non-tender, Non-distended     Extremities: No calf tenderness     Incision: c/d/i + sutures   LABS:                        13.3   6.32  )-----------( 259      ( 04 Jul 2024 00:46 )             39.8    07-04    143  |  107  |  21  ----------------------------<  106<H>  3.8   |  23  |  0.87    Ca    9.5      04 Jul 2024 00:46  Phos  4.5     07-04  Mg     2.0     07-04    TPro  7.2  /  Alb  4.1  /  TBili  0.6  /  DBili  x   /  AST  29  /  ALT  65<H>  /  AlkPhos  97  07-04  PT/INR - ( 04 Jul 2024 00:46 )   PT: 9.7 sec;   INR: 0.88 ratio         PTT - ( 04 Jul 2024 00:46 )  PTT:28.4 sec    MEDICATIONS:  Anticoagulation:   enoxaparin Injectable 40 milliGRAM(s) SubCutaneous <User Schedule>    Antibiotics:    Endo:  atorvastatin 20 milliGRAM(s) Oral at bedtime  dexAMETHasone  Injectable 4 milliGRAM(s) IV Push every 6 hours    Neuro:  acetaminophen     Tablet .. 650 milliGRAM(s) Oral every 6 hours PRN Temp greater or equal to 38.5C (101.3F), Mild Pain (1 - 3)  ondansetron Injectable 4 milliGRAM(s) IV Push every 6 hours PRN Nausea and/or Vomiting  oxyCODONE    IR 5 milliGRAM(s) Oral every 4 hours PRN Moderate Pain (4 - 6)  oxyCODONE    IR 10 milliGRAM(s) Oral every 4 hours PRN Severe Pain (7 - 10)    Cardiac:    Pulm:    GI/:  pantoprazole  Injectable 40 milliGRAM(s) IV Push daily  polyethylene glycol 3350 17 Gram(s) Oral daily  senna 2 Tablet(s) Oral at bedtime    Other:     DIET: regular     IMAGING:    SUBJECTIVE:  Patient seen and examined.  Patient has a headache.  Patient states vision has been slowly getting worse in the last month but acutely worsening in last 3 days.  She does state that since starting decadron yesterday she feels her vision is improved.     Vital Signs Last 24 Hrs  T(C): 37 (04 Jul 2024 09:37), Max: 37 (04 Jul 2024 09:37)  T(F): 98.6 (04 Jul 2024 09:37), Max: 98.6 (04 Jul 2024 09:37)  HR: 82 (04 Jul 2024 10:17) (58 - 87)  BP: 130/71 (04 Jul 2024 10:17) (99/64 - 132/83)  BP(mean): 92 (04 Jul 2024 02:32) (92 - 97)  RR: 18 (04 Jul 2024 09:37) (14 - 20)  SpO2: 96% (04 Jul 2024 10:17) (95% - 98%)    Parameters below as of 04 Jul 2024 10:17  Patient On (Oxygen Delivery Method): room air        PHYSICAL EXAM:    Constitutional: No Acute Distress     Neurological: AOx3, Following Commands, Moving all Extremities left eye left upper quadrant opsia                                                  Sensation: [x] intact to light touch  [] decreased:     Pulmonary: Clear to Auscultation, No rales, No rhonchi, No wheezes     Cardiovascular: S1, S2, Regular rate and rhythm     Gastrointestinal: Soft, Non-tender, Non-distended     Extremities: No calf tenderness     LABS:                        13.3   6.32  )-----------( 259      ( 04 Jul 2024 00:46 )             39.8    07-04    143  |  107  |  21  ----------------------------<  106<H>  3.8   |  23  |  0.87    Ca    9.5      04 Jul 2024 00:46  Phos  4.5     07-04  Mg     2.0     07-04    TPro  7.2  /  Alb  4.1  /  TBili  0.6  /  DBili  x   /  AST  29  /  ALT  65<H>  /  AlkPhos  97  07-04  PT/INR - ( 04 Jul 2024 00:46 )   PT: 9.7 sec;   INR: 0.88 ratio         PTT - ( 04 Jul 2024 00:46 )  PTT:28.4 sec    MEDICATIONS:  Anticoagulation:   enoxaparin Injectable 40 milliGRAM(s) SubCutaneous <User Schedule>    Antibiotics:    Endo:  atorvastatin 20 milliGRAM(s) Oral at bedtime  dexAMETHasone  Injectable 4 milliGRAM(s) IV Push every 6 hours    Neuro:  acetaminophen     Tablet .. 650 milliGRAM(s) Oral every 6 hours PRN Temp greater or equal to 38.5C (101.3F), Mild Pain (1 - 3)  ondansetron Injectable 4 milliGRAM(s) IV Push every 6 hours PRN Nausea and/or Vomiting  oxyCODONE    IR 5 milliGRAM(s) Oral every 4 hours PRN Moderate Pain (4 - 6)  oxyCODONE    IR 10 milliGRAM(s) Oral every 4 hours PRN Severe Pain (7 - 10)    Cardiac:    Pulm:    GI/:  pantoprazole  Injectable 40 milliGRAM(s) IV Push daily  polyethylene glycol 3350 17 Gram(s) Oral daily  senna 2 Tablet(s) Oral at bedtime    Other:     DIET: regular     IMAGING:

## 2024-07-04 NOTE — PATIENT PROFILE ADULT - INTERPRETATION SERVICES DECLINED
Patient/Caregiver requests family/friend to interpret. The patient is a 41y Female complaining of chest pain.

## 2024-07-04 NOTE — CONSULT NOTE ADULT - SUBJECTIVE AND OBJECTIVE BOX
CC: pituitary adenoma    HPI: 44F Malay speaking ([facility], [facility] xfer) p/f progressive HAs/L eye blurry vision/short term mem loss x1mo, w/ acutely worse Lt side HA x2d. At 11AM yesterday suddenly could only see figures/shadows from Lt eye. Optho noted Lt optic nn. dysfxn/no papilledema. 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 ENT consulted for TSRP planning today. Pt seen at bedside, states headache is controlled with pain medication. No acute complaints. Denies history of nasal surgery. Denies fever, N/V, nasal pain, rhinorrhea, recent URI, congestion, facial pain, facial tenderness, headache, post nasal drip, or vision changes. Denies dysphagia, odynophagia, dysphonia, SOB, dyspnea, changes in voice or inability to tolerate secretions.       PAST MEDICAL & SURGICAL HISTORY:  Hyperlipidemia      H/O: hysterectomy        Allergies    No Known Allergies    Intolerances      MEDICATIONS  (STANDING):  atorvastatin 20 milliGRAM(s) Oral at bedtime  dexAMETHasone  Injectable 4 milliGRAM(s) IV Push every 6 hours  pantoprazole  Injectable 40 milliGRAM(s) IV Push daily  polyethylene glycol 3350 17 Gram(s) Oral daily  senna 2 Tablet(s) Oral at bedtime  sodium chloride 0.9%. 1000 milliLiter(s) (75 mL/Hr) IV Continuous <Continuous>    MEDICATIONS  (PRN):  acetaminophen   IVPB .. 1000 milliGRAM(s) IV Intermittent every 6 hours PRN Mild Pain (1 - 3)  ondansetron Injectable 4 milliGRAM(s) IV Push every 6 hours PRN Nausea and/or Vomiting  oxyCODONE    IR 10 milliGRAM(s) Oral every 4 hours PRN Severe Pain (7 - 10)  oxyCODONE    IR 5 milliGRAM(s) Oral every 4 hours PRN Moderate Pain (4 - 6)       Social History:  · Substance use  No    Tobacco Screening:  · Core Measure Site  No      FAMILY HISTORY:  FH: brain tumor (Aunt)    FH: stomach cancer    FH: thyroid disease (Grandparent)    FH: type 2 diabetes        ROS:   ENT: all negative except as noted in HPI   CV: denies palpitations  Pulm: denies SOB, cough, hemoptysis  GI: denies change in apetite, indigestion, n/v  : denies pertinent urinary symptoms, urgency  Neuro: denies numbness/tingling, loss of sensation  Psych: denies anxiety  MS: denies muscle weakness, instability  Heme: denies easy bruising or bleeding  Endo: denies heat/cold intolerance, excessive sweating  Vascular: denies LE edema    Vital Signs Last 24 Hrs  T(C): 36.5 (04 Jul 2024 04:55), Max: 36.7 (04 Jul 2024 02:32)  T(F): 97.7 (04 Jul 2024 04:55), Max: 98 (04 Jul 2024 02:32)  HR: 60 (04 Jul 2024 04:55) (58 - 67)  BP: 113/76 (04 Jul 2024 04:55) (113/76 - 132/83)  BP(mean): 92 (04 Jul 2024 02:32) (92 - 97)  RR: 20 (04 Jul 2024 04:55) (14 - 20)  SpO2: 98% (04 Jul 2024 04:55) (96% - 98%)    Parameters below as of 04 Jul 2024 04:55  Patient On (Oxygen Delivery Method): room air                              13.3   6.32  )-----------( 259      ( 04 Jul 2024 00:46 )             39.8    07-04    143  |  107  |  21  ----------------------------<  106<H>  3.8   |  23  |  0.87    Ca    9.5      04 Jul 2024 00:46  Phos  4.5     07-04  Mg     2.0     07-04    TPro  7.2  /  Alb  4.1  /  TBili  0.6  /  DBili  x   /  AST  29  /  ALT  65<H>  /  AlkPhos  97  07-04   PT/INR - ( 04 Jul 2024 00:46 )   PT: 9.7 sec;   INR: 0.88 ratio         PTT - ( 04 Jul 2024 00:46 )  PTT:28.4 sec    PHYSICAL EXAM:  Gen: NAD  Skin: No rashes, bruises, or lesions  Head: Normocephalic, Atraumatic  Face: no edema, erythema, or fluctuance. Parotid glands soft without mass  Eyes: no scleral injection  Nose: Nares bilaterally patent, no discharge  Mouth: No Stridor / Drooling / Trismus.  Mucosa moist, tongue/uvula midline, oropharynx clear  Neck: Flat, supple, no lymphadenopathy, trachea midline, no masses  Lymphatic: No lymphadenopathy  Resp: breathing easily, no stridor  CV: no peripheral edema/cyanosis  GI: nondistended   Peripheral vascular: no JVD or edema  Neuro: facial nerve intact, no facial droop

## 2024-07-04 NOTE — PATIENT PROFILE ADULT - FALL HARM RISK - HARM RISK INTERVENTIONS

## 2024-07-04 NOTE — PROGRESS NOTE ADULT - ASSESSMENT
HPI:  44F Iranian speaking ([facility] xfer) p/f progressive HAs/L eye blurry vision/short term mem loss x1mo, w/ acutely worse Lt side HA x2d. At 11AM yesterday suddenly could only see figures/shadows from Lt eye. Optho noted Lt optic nn. dysfxn/no papilledema. 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 Exam: Wide awake, Ox3, pupils dilated by optho, VFF to finger counting b/l but Lt eye can only see shadows/figure outlines, no drift o/w intact  (04 Jul 2024 02:28)    PROCEDURE:  7/3 stage 1 DBS   POD# 1  PAST MEDICAL & SURGICAL HISTORY:  Hyperlipidemia      H/O: hysterectomy            PLAN:  Neuro:   neuro and vital checks q 4   pain control with tylenol and oxycodone   reviewed medication for parkinsons.  wife to keep copy of medication with her and timing   stage 2 7/8    Respiratory: incentive spirometry    CV: keep sbp 100-160  Endocrine: euglycemia   Heme/Onc:      stable        DVT ppx: scds, lovenox tonight if he needs to stay  Renal:  ivl  ID: afebrile   GI:  tolerating   PT/OT: home with assistance from wife (son to take fmla per wife for assistance )  rolling walker which wife and patient declined   Will discuss with ____          Spectralink #  HPI:  44F Bel[place]an speaking ([facility] xfer) p/f progressive HAs/L eye blurry vision/short term mem loss x1mo, w/ acutely worse Lt side HA x2d. At 11AM yesterday suddenly could only see figures/shadows from Lt eye. Optho noted Lt optic nn. dysfxn/no papilledema. 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 Exam: Wide awake, Ox3, pupils dilated by optho, VFF to finger counting b/l but Lt eye can only see shadows/figure outlines, no drift o/w intact  (04 Jul 2024 02:28)      PAST MEDICAL & SURGICAL HISTORY:  Hyperlipidemia      H/O: hysterectomy            PLAN:  Neuro:   neuro and vital checks q 4   pain control with tylenol and oxycodone   decadron for cerebral edema  OR next week     Respiratory: incentive spirometry    CV: keep sbp 100-160  Endocrine: euglycemia  endocrine  consult emailed   Heme/Onc:      stable        DVT ppx: scds, lovenox   ID: afebrile   GI:  tolerating   miralax, senna   PT/OT:will see   medicine consult appreciated .        BATS # [id number]

## 2024-07-04 NOTE — H&P ADULT - HISTORY OF PRESENT ILLNESS
44F Luxem[place]ish speaking ([facility] xfer) p/f progressive HAs/L eye blurry vision/short term mem loss x1mo, w/ acutely worse Lt side HA x2d. At 11AM yesterday suddenly could only see figures/shadows from Lt eye. Optho noted Lt optic nn. dysfxn/no papilledema. 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 Exam: Wide awake, Ox3, pupils dilated by optho, VFF to finger counting b/l but Lt eye can only see shadows/figure outlines, no drift o/w intact

## 2024-07-04 NOTE — CONSULT NOTE ADULT - ASSESSMENT
44F hx of thyroid goiter, HLD presenting for progressive HAs/L eye vision deficit. 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 Endocrine consulted for: Sellar mass, possibly pituitary adenoma    #Sellar mass, possibly pituitary adenoma  1.9cm sellar mass with L optic nerve compression with visual deficits    Started having progressive headache and L vision deficits about 1 month ago. Yesterday had significant vision loss in L eye which prompted her to come to the emergency room. She also notes some palpitations and cold sweats overnight in the past week. She denies fatigue, temperature intolerance, n/v/d, constipation, lightheadedness or dizziness, galactorrhea, breast fullness. Has hx of hysterectomy.    She has thyroid goiter for which she follows up with Endocrinologist Dr. Espino ([facility]) since 2021. Only been monitoring with US. Her recent TFT's from 6/24/24 showed TSH of 3.43, FT4 of 0.9 outpatient.    Preop pituitary workup:  - Prolactin 153, diluted 108.  - TSH 6.40 mildly elevated, Total T4 6.8  - 2am cortisol was 4.0  - Estradiol 36, LH 7.4, FSH 9.4    Suspect that this is likely non-functioning pituitary adenoma given most pituitary hormone levels without significant abnormalities.  TSH mildly elevated, but given very recent normal TSH less than 1 week ago, suspect likely not TSH-producing adenoma.  Prolactin mildly elevated likely stalk-effect from mass.  Patient was started on dexamethasone 4mg IV q6h since 5am 7/4; not able to assess HPA axis.    Recommendations  - Check Free T4 tomorrow AM. If Free T4 level is low, then will start levothyroxine prior to surgery.  - Check IGF-1 tomorrow AM  - Unable to assess HPA axis while on steroids  - Patient is pending possible TSSR tomorrow   - Check TPO antibodies in the case that TSH elevation is due to primary subclinical hypothyroidism.    Discussed with primary team    Donnell Evans MD  Endocrine Fellow  Can be reached via teams. For follow up questions, discharge recommendations, or new consults, please call answering service at [phone] (weekdays); [phone] (nights/weekends). 44F hx of thyroid goiter, HLD presenting for progressive HAs/L eye vision deficit. 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 Endocrine consulted for: Sellar mass, possibly pituitary adenoma    #Sellar mass, possibly pituitary adenoma  1.9cm sellar mass with L optic nerve compression with visual deficits. +headachea, L visual deficits, palpitations, cold sweats. Otherwise no significant sx: No fatigue, temperature intolerance, n/v/d, constipation, lightheadedness or dizziness, galactorrhea, breast fullness. Has hx of hysterectomy.    Preop pituitary workup:  - Prolactin 153, diluted 108.  - TSH 6.40 mildly elevated, Total T4 6.8  - 2am cortisol was 4.0  - Estradiol 36, LH 7.4, FSH 9.4    Her recent TFT's from 6/24/24 showed TSH of 3.43, FT4 of 0.9 outpatient.    Suspect that this is likely non-functioning pituitary adenoma given most pituitary hormone levels without significant abnormalities.  TSH mildly elevated, but given very recent normal TSH less than 1 week ago, suspect likely not TSH-producing adenoma. Other DDx are subclinical primary hypothyroidism.   Prolactin mildly elevated likely stalk-effect from mass.  Patient was started on dexamethasone 4mg IV q6h since 5am 7/4; not able to assess HPA axis.    Recommendations  - Check Free T4 tomorrow AM. If Free T4 level is low, then will start levothyroxine prior to surgery.  - Check IGF-1 tomorrow AM  - Unable to assess HPA axis while on steroids  - Patient is pending possible TSSR tomorrow   - Check TPO antibodies in the case that TSH elevation is due to primary subclinical hypothyroidism.    #Thyroid goiter  She has thyroid goiter for which she follows up with Endocrinologist Dr. Espino ([facility]) since 2021. Only been monitoring with US.   - Follow up outpatient with Dr. Espino    Discussed with primary team    Donnell Evans MD  Endocrine Fellow  Can be reached via teams. For follow up questions, discharge recommendations, or new consults, please call answering service at [phone] (weekdays); [phone] (nights/weekends). 44F hx of thyroid goiter, HLD presenting for progressive HAs/L eye vision deficit. 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 Endocrine consulted for: Sellar mass, possibly pituitary adenoma    #Sellar mass, possibly pituitary adenoma  1.9cm sellar mass with L optic nerve compression with visual deficits. +headachea, L visual deficits, palpitations, cold sweats. Otherwise no significant sx: No fatigue, temperature intolerance, n/v/d, constipation, lightheadedness or dizziness, galactorrhea, breast fullness. Has hx of hysterectomy.    Preop pituitary workup:  - Prolactin 153, diluted 108.  - TSH 6.40 mildly elevated, Total T4 6.8  - 2am cortisol was 4.0  - Estradiol 36, LH 7.4, FSH 9.4    Her recent TFT's from 6/24/24 showed TSH of 3.43, FT4 of 0.9 outpatient.    Suspect that this is likely non-functioning pituitary adenoma given most pituitary hormone levels without significant abnormalities.  TSH mildly elevated, but given very recent normal TSH less than 1 week ago, suspect likely not TSH-producing adenoma. Other DDx are subclinical primary hypothyroidism.   Prolactin mildly elevated likely stalk-effect from mass.  Patient was started on dexamethasone 4mg IV q6h since 5am 7/4; not able to assess HPA axis.    Recommendations  - Will add on Free T4 to prior labs. If unable to, please check Free T4 tomorrow AM. If Free T4 level is low, then will start levothyroxine prior to surgery.  - Check IGF-1 tomorrow AM  - Unable to assess HPA axis while on steroids  - Patient is pending possible TSSR tomorrow   - Check TPO antibodies in the case that TSH elevation is due to primary subclinical hypothyroidism.    #Thyroid goiter  She has thyroid goiter for which she follows up with Endocrinologist Dr. Espino ([facility]) since 2021. Only been monitoring with US.   - Follow up outpatient with Dr. Espino    Discussed with primary team    Donnell Evans MD  Endocrine Fellow  Can be reached via teams. For follow up questions, discharge recommendations, or new consults, please call answering service at [phone] (weekdays); [phone] (nights/weekends). 44F hx of thyroid goiter, HLD presenting for progressive HAs/L eye vision deficit. 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 Endocrine consulted for: Sellar mass, possibly pituitary adenoma    #Sellar mass, possibly pituitary adenoma  1.9cm sellar mass with L optic nerve compression with visual deficits. +headachea, L visual deficits, palpitations, cold sweats. Otherwise no significant sx: No fatigue, temperature intolerance, n/v/d, constipation, lightheadedness or dizziness, galactorrhea, breast fullness. Has hx of hysterectomy.    Preop pituitary workup:  - Prolactin 153, diluted 108.  - TSH 6.40 mildly elevated, Total T4 6.8  - 2am cortisol was 4.0  - Estradiol 36, LH 7.4, FSH 9.4    Her recent TFT's from 6/24/24 showed TSH of 3.43, FT4 of 0.9 outpatient.    Suspect that this is likely non-functioning pituitary adenoma given most pituitary hormone levels without significant abnormalities.  TSH mildly elevated, but given very recent normal TSH less than 1 week ago, lower suspicion for subclinical primary hypothyroidism.   Prolactin mildly elevated likely stalk-effect from mass.  Patient was started on dexamethasone 4mg IV q6h since 5am 7/4; not able to assess HPA axis.    Recommendations  - Will add on Free T4 to prior labs. If unable to, please check Free T4 tomorrow AM. If Free T4 level is low, then will start levothyroxine prior to surgery.  - Check IGF-1 tomorrow AM  - Patient on steroids. Unable to assess HPA axis while on steroids  - Patient is pending possible TSSR tomorrow   - Check TPO antibodies  - repeat pituitary labs when patient is post-op: FT4, TSH, IGF-1, LH, FSH, prolactin    #Thyroid goiter  She has thyroid goiter for which she follows up with Endocrinologist Dr. Espino ([facility]) since 2021. Only been monitoring with US.   - Follow up outpatient with Dr. Espino    Discussed with primary team    Donnell Evans MD  Endocrine Fellow  Can be reached via teams. For follow up questions, discharge recommendations, or new consults, please call answering service at [phone] (weekdays); [phone] (nights/weekends). 44F hx of thyroid goiter, HLD presenting for progressive HAs/L eye vision deficit. 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 Endocrine consulted for: Sellar mass, possibly pituitary adenoma    #Sellar mass, possibly pituitary adenoma  1.9cm sellar mass with L optic nerve compression with visual deficits. +headachea, L visual deficits, palpitations, cold sweats. Otherwise no significant sx: No fatigue, temperature intolerance, n/v/d, constipation, lightheadedness or dizziness, galactorrhea, breast fullness. Has hx of hysterectomy.    Preop pituitary workup:  - Prolactin 153, diluted 108.  - TSH 6.40 mildly elevated, Total T4 6.8  - 2am cortisol was 4.0  - Estradiol 36, LH 7.4, FSH 9.4    Her recent TFT's from 6/24/24 showed TSH of 3.43, FT4 of 0.9 outpatient.    Suspect that this is likely non-functioning pituitary adenoma given most pituitary hormone levels without significant abnormalities.  TSH mildly elevated, but given very recent normal TSH less than 1 week ago, lower suspicion for subclinical primary hypothyroidism.   Prolactin mildly elevated likely stalk-effect from mass.  Patient was started on dexamethasone 4mg IV q6h since 5am 7/4; not able to assess HPA axis.    Recommendations  - Will add on Free T4 to prior labs. If unable to, please check Free T4 tomorrow AM. If Free T4 level is low, then will start levothyroxine prior to surgery.  - Check IGF-1 tomorrow AM  - Patient on steroids. Unable to assess HPA axis while on steroids  - Patient is pending possible TSSR next week Tuesday  - Check TPO antibodies  - repeat pituitary labs when patient is post-op: FT4, TSH, IGF-1, LH, FSH, prolactin    #Thyroid goiter  She has thyroid goiter for which she follows up with Endocrinologist Dr. Espino ([facility]) since 2021. Only been monitoring with US.   - Follow up outpatient with Dr. Espino    Discussed with primary team    Donnell Evans MD  Endocrine Fellow  Can be reached via teams. For follow up questions, discharge recommendations, or new consults, please call answering service at [phone] (weekdays); [phone] (nights/weekends). 44F hx of thyroid goiter, HLD presenting for progressive HAs/L eye vision deficit. 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 Endocrine consulted for: Sellar mass, possibly pituitary adenoma    #Sellar mass, possibly pituitary adenoma  1.9cm sellar mass with L optic nerve compression with visual deficits. +headachea, L visual deficits, palpitations, cold sweats. Otherwise no significant sx: No fatigue, temperature intolerance, n/v/d, constipation, lightheadedness or dizziness, galactorrhea, breast fullness. Has hx of hysterectomy.    Preop pituitary workup:  - Prolactin 153, diluted 108.  - TSH 6.40 mildly elevated, Total T4 6.8  - 2am cortisol was 4.0  - Estradiol 36, LH 7.4, FSH 9.4    Her recent TFT's from 6/24/24 showed TSH of 3.43, FT4 of 0.9 outpatient.    Suspect that this is likely non-functioning pituitary adenoma given most pituitary hormone levels without significant abnormalities.  TSH mildly elevated, indicating pituitary function,  very recent normal TSH less than 1 week ago.  Prolactin mildly elevated likely stalk-effect from mass.  Patient was started on dexamethasone 4mg IV q6h since 5am 7/4; not able to assess HPA axis.    Recommendations  - Will add on Free T4 to prior labs. If unable to, please check Free T4 tomorrow AM. If Free T4 level is low, then will start levothyroxine prior to surgery.  - Check IGF-1 tomorrow AM  - Patient on steroids. Unable to assess HPA axis while on steroids  - Patient is pending possible TSSR next week Tuesday  - Check TPO antibodies  - repeat pituitary labs when patient is post-op: FT4, TSH, IGF-1, LH, FSH, prolactin    #Thyroid goiter  She has thyroid goiter for which she follows up with Endocrinologist Dr. Espino ([facility]) since 2021. Only been monitoring with US.   - Follow up outpatient with Dr. Espino    Discussed with primary team    Donnell Evans MD  Endocrine Fellow  Can be reached via teams. For follow up questions, discharge recommendations, or new consults, please call answering service at [phone] (weekdays); [phone] (nights/weekends).

## 2024-07-04 NOTE — CONSULT NOTE ADULT - ASSESSMENT
44F hx of HLD F[place]ian speaking ([facility] xfer) p/f progressive HAs/L eye blurry vision and headache, CTH w/ 1.9cm sellar/suprasellar mass asymmetric to the Lt, c/f pituitary adenoma w/ L optic nerve comp, NSGY planning OR for patient - TSP for resection of tumor. Medicine consulted for pre-operative optimization.    #Pre-operative optimization  - Procedure w/ NSGY not elevated risk surgery  - Pt w/ prior hx of arrhythmia however no sx currently, ECG wnl. No other cardiac or pulmonary past medical history. No c/f active ischemia, heart failure, no c/f active pulmonary pathology. Pt w/o significant adverse rxns to anesthesia. No medications that warrant adjustment.  - RCRI 0, 3.9% 30-day risk of death, MI, or cardiac arrest     Recommendations  - Patient optimized for procedure, no contraindications to procedure.  - Pt not warranting medication adjustments at this time.  - DVT PPx, pain control per neurosurgery.  - Management of vision changes and optic nerve abnormalities per neurosurgery/ophthalmology consultant team.  44F hx of HLD [place]ese speaking ([facility] xfer) p/f progressive HAs/L eye blurry vision and headache, CTH w/ 1.9cm sellar/suprasellar mass asymmetric to the Lt, c/f pituitary adenoma w/ L optic nerve comp, NSGY planning OR for patient - TSP for resection of tumor. Medicine consulted for pre-operative optimization.    #Pre-operative optimization  - Procedure w/ NSGY not elevated risk surgery  - Pt w/ prior hx of arrhythmia however no sx currently, ECG wnl. No other cardiac or pulmonary past medical history. No c/f active ischemia, heart failure, no c/f active pulmonary pathology. Pt w/o significant adverse rxns to anesthesia. No medications that warrant adjustment.  - RCRI 0, 3.9% 30-day risk of death, MI, or cardiac arrest     Recommendations  - Patient is optimized for procedure, with no contraindications to proceed.  - Pt not warranting medication adjustments at this time.  - DVT PPx, pain control per neurosurgery.  - Management of vision changes and optic nerve abnormalities per neurosurgery/ophthalmology consultant team.

## 2024-07-04 NOTE — CONSULT NOTE ADULT - SUBJECTIVE AND OBJECTIVE BOX
Andra [facility] IM Resident Cardiology Consult Service  All Cardiology service information can be found 24/7 on [email or web address], password: [email or web address]    Patient seen and evaluated at bedside    Chief Complaint: Vision changes    HPI: 44F hx of HLD English speaking (Formerly [facility], 1928–1983 xfer) p/f progressive HAs/L eye blurry vision and headache. 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 NSGY planning OR for patient - TSP for resection of tumor. Medicine consulted for pre-operative optimization.    Patient reporting no chest pain, SOB, leg swelling, no current palpitations, no prior cardiac or lung disease. Reports hx of arrhythmia not specified evaluated with recorder and follows with cardiology however daughter reporting that the pt had minimal burden of arrhythmia. Pt only taking atorvastatin for HLD at home, no other medications. Pt able to walk up flight of stairs and walk 1 city block. No prior adverse rxns to anesthesia however daughter saying that post-op after hysterectomy pt did "take a long time to wake up." but otherwise did not have prolonged admission.     ECG with sinus rhythm.     PMHx:   Hyperlipidemia    PSHx:   H/O: hysterectomy    Allergies:  No Known Allergies    Home Meds:    Current Medications:   acetaminophen   IVPB .. 1000 milliGRAM(s) IV Intermittent every 6 hours PRN  atorvastatin 20 milliGRAM(s) Oral at bedtime  dexAMETHasone  Injectable 4 milliGRAM(s) IV Push every 6 hours  ondansetron Injectable 4 milliGRAM(s) IV Push every 6 hours PRN  oxyCODONE    IR 10 milliGRAM(s) Oral every 4 hours PRN  oxyCODONE    IR 5 milliGRAM(s) Oral every 4 hours PRN  pantoprazole  Injectable 40 milliGRAM(s) IV Push daily  polyethylene glycol 3350 17 Gram(s) Oral daily  senna 2 Tablet(s) Oral at bedtime  sodium chloride 0.9%. 1000 milliLiter(s) IV Continuous <Continuous>    FAMILY HISTORY:  FH: brain tumor (Aunt)    FH: stomach cancer    FH: thyroid disease (Grandparent)    FH: type 2 diabetes    REVIEW OF SYSTEMS:  CONSTITUTIONAL: No weakness, fevers or chills  EYES/ENT: No visual changes;  No dysphagia  NECK: No pain or stiffness  RESPIRATORY: No cough, wheezing, hemoptysis; No shortness of breath  CARDIOVASCULAR: No chest pain or palpitations; No lower extremity edema  GASTROINTESTINAL: No abdominal or epigastric pain. No nausea, vomiting, or hematemesis; No diarrhea or constipation. No melena or hematochezia.  BACK: No back pain  GENITOURINARY: No dysuria, frequency or hematuria  NEUROLOGICAL: No numbness or weakness  SKIN: No itching, burning, rashes, or lesions   All other review of systems is negative unless indicated above.    Physical Exam:  T(F): 98 (07-04), Max: 98 (07-04)  HR: 65 (07-04) (58 - 67)  BP: 119/82 (07-04) (119/82 - 132/83)  RR: 14 (07-04)  SpO2: 98% (07-04)  GENERAL: No acute distress, well-developed  HEAD:  Atraumatic, Normocephalic  ENT: EOMI, PERRLA, conjunctiva and sclera clear, Neck supple, No JVD, moist mucosa  CHEST/LUNG: Clear to auscultation bilaterally; No wheeze, equal breath sounds bilaterally   BACK: No spinal tenderness  HEART: Regular rate and rhythm; No murmurs, rubs, or gallops  ABDOMEN: Soft, Nontender, Nondistended; Bowel sounds present  EXTREMITIES:  No clubbing, cyanosis, or edema  PSYCH: Nl behavior, nl affect  NEUROLOGY: AAOx3, non-focal, cranial nerves intact  SKIN: Normal color, No rashes or lesions  LINES:    Labs: Personally reviewed                        13.3   6.32  )-----------( 259      ( 04 Jul 2024 00:46 )             39.8     07-04    143  |  107  |  21  ----------------------------<  106<H>  3.8   |  23  |  0.87    Ca    9.5      04 Jul 2024 00:46  Phos  4.5     07-04  Mg     2.0     07-04    TPro  7.2  /  Alb  4.1  /  TBili  0.6  /  DBili  x   /  AST  29  /  ALT  65<H>  /  AlkPhos  97  07-04    PT/INR - ( 04 Jul 2024 00:46 )   PT: 9.7 sec;   INR: 0.88 ratio         PTT - ( 04 Jul 2024 00:46 )  PTT:28.4 sec                Thyroid Stimulating Hormone, Serum: 6.40 uIU/mL (07-04 @ 00:46)     Andra Cuba IM Resident     Patient seen and evaluated at bedside    Chief Complaint: Vision changes    HPI: 44F hx of HLD Burmese speaking (Formerly [facility] xfer) p/f progressive HAs/L eye blurry vision and headache. 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 NSGY planning OR for patient - TSP for resection of tumor. Medicine consulted for pre-operative optimization.    Patient reporting no chest pain, SOB, leg swelling, no current palpitations, no prior cardiac or lung disease. Reports hx of arrhythmia not specified evaluated with recorder and follows with cardiology however daughter reporting that the pt had minimal burden of arrhythmia. Pt only taking atorvastatin for HLD at home, no other medications. Pt able to walk up flight of stairs and walk 1 city block. No prior adverse rxns to anesthesia however daughter saying that post-op after hysterectomy pt did "take a long time to wake up." but otherwise did not have prolonged admission.     ECG with sinus rhythm.     PMHx:   Hyperlipidemia    PSHx:   H/O: hysterectomy    Allergies:  No Known Allergies    Home Meds:  Atorvastatin    Current Medications:   acetaminophen   IVPB .. 1000 milliGRAM(s) IV Intermittent every 6 hours PRN  atorvastatin 20 milliGRAM(s) Oral at bedtime  dexAMETHasone  Injectable 4 milliGRAM(s) IV Push every 6 hours  ondansetron Injectable 4 milliGRAM(s) IV Push every 6 hours PRN  oxyCODONE    IR 10 milliGRAM(s) Oral every 4 hours PRN  oxyCODONE    IR 5 milliGRAM(s) Oral every 4 hours PRN  pantoprazole  Injectable 40 milliGRAM(s) IV Push daily  polyethylene glycol 3350 17 Gram(s) Oral daily  senna 2 Tablet(s) Oral at bedtime  sodium chloride 0.9%. 1000 milliLiter(s) IV Continuous <Continuous>    FAMILY HISTORY:  FH: brain tumor (Aunt)    FH: stomach cancer    FH: thyroid disease (Grandparent)    FH: type 2 diabetes    REVIEW OF SYSTEMS:  CONSTITUTIONAL: No weakness, fevers or chills  EYES/ENT: No visual changes;  No dysphagia  NECK: No pain or stiffness  RESPIRATORY: No cough, wheezing, hemoptysis; No shortness of breath  CARDIOVASCULAR: No chest pain or palpitations; No lower extremity edema  GASTROINTESTINAL: No abdominal or epigastric pain. No nausea, vomiting, or hematemesis; No diarrhea or constipation. No melena or hematochezia.  BACK: No back pain  GENITOURINARY: No dysuria, frequency or hematuria  NEUROLOGICAL: No numbness or weakness  SKIN: No itching, burning, rashes, or lesions   All other review of systems is negative unless indicated above.    Physical Exam:  T(F): 98 (07-04), Max: 98 (07-04)  HR: 65 (07-04) (58 - 67)  BP: 119/82 (07-04) (119/82 - 132/83)  RR: 14 (07-04)  SpO2: 98% (07-04)  GENERAL: No acute distress, well-developed  HEAD:  Atraumatic, Normocephalic  ENT: EOMI, PERRLA, conjunctiva and sclera clear, Neck supple, No JVD, moist mucosa  CHEST/LUNG: Clear to auscultation bilaterally; No wheeze, equal breath sounds bilaterally   BACK: No spinal tenderness  HEART: Regular rate and rhythm; No murmurs, rubs, or gallops  ABDOMEN: Soft, Nontender, Nondistended; Bowel sounds present  EXTREMITIES:  No clubbing, cyanosis, or edema  PSYCH: Nl behavior, nl affect  NEUROLOGY: AAOx3, non-focal, cranial nerves intact  SKIN: Normal color, No rashes or lesions  LINES:    Labs: Personally reviewed                        13.3   6.32  )-----------( 259      ( 04 Jul 2024 00:46 )             39.8     07-04    143  |  107  |  21  ----------------------------<  106<H>  3.8   |  23  |  0.87    Ca    9.5      04 Jul 2024 00:46  Phos  4.5     07-04  Mg     2.0     07-04    TPro  7.2  /  Alb  4.1  /  TBili  0.6  /  DBili  x   /  AST  29  /  ALT  65<H>  /  AlkPhos  97  07-04    PT/INR - ( 04 Jul 2024 00:46 )   PT: 9.7 sec;   INR: 0.88 ratio         PTT - ( 04 Jul 2024 00:46 )  PTT:28.4 sec                Thyroid Stimulating Hormone, Serum: 6.40 uIU/mL (07-04 @ 00:46)     Andra Cuba IM Resident     Patient seen and evaluated at bedside    Chief Complaint: Vision changes    HPI: 44F hx of HLD Macedonian speaking ([facility] xfer) p/f progressive HAs/L eye blurry vision and headache. 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 NSGY planning OR for patient - TSP for resection of tumor. Medicine consulted for pre-operative optimization.    Patient reporting no chest pain, SOB, leg swelling, no current palpitations, no prior cardiac or lung disease. Reports hx of arrhythmia not specified evaluated with recorder and follows with cardiology however daughter reporting that the pt had minimal burden of arrhythmia. Pt only taking atorvastatin for HLD at home, no other medications. Pt able to walk up flight of stairs and walk 1 city block. No prior adverse rxns to anesthesia however daughter saying that post-op after hysterectomy pt did "take a long time to wake up." but otherwise did not have prolonged admission.     ECG with sinus rhythm.     PMHx:   Hyperlipidemia    PSHx:   H/O: hysterectomy    Allergies:  No Known Allergies    Home Meds:  Atorvastatin 20mg oral daily    Current Medications:   acetaminophen   IVPB .. 1000 milliGRAM(s) IV Intermittent every 6 hours PRN  atorvastatin 20 milliGRAM(s) Oral at bedtime  dexAMETHasone  Injectable 4 milliGRAM(s) IV Push every 6 hours  ondansetron Injectable 4 milliGRAM(s) IV Push every 6 hours PRN  oxyCODONE    IR 10 milliGRAM(s) Oral every 4 hours PRN  oxyCODONE    IR 5 milliGRAM(s) Oral every 4 hours PRN  pantoprazole  Injectable 40 milliGRAM(s) IV Push daily  polyethylene glycol 3350 17 Gram(s) Oral daily  senna 2 Tablet(s) Oral at bedtime  sodium chloride 0.9%. 1000 milliLiter(s) IV Continuous <Continuous>    FAMILY HISTORY:  FH: brain tumor (Aunt)    FH: stomach cancer    FH: thyroid disease (Grandparent)    FH: type 2 diabetes    REVIEW OF SYSTEMS:  CONSTITUTIONAL: No weakness, fevers or chills  EYES/ENT: No visual changes;  No dysphagia  NECK: No pain or stiffness  RESPIRATORY: No cough, wheezing, hemoptysis; No shortness of breath  CARDIOVASCULAR: No chest pain or palpitations; No lower extremity edema  GASTROINTESTINAL: No abdominal or epigastric pain. No nausea, vomiting, or hematemesis; No diarrhea or constipation. No melena or hematochezia.  BACK: No back pain  GENITOURINARY: No dysuria, frequency or hematuria  NEUROLOGICAL: No numbness or weakness  SKIN: No itching, burning, rashes, or lesions   All other review of systems is negative unless indicated above.    Physical Exam:  T(F): 98 (07-04), Max: 98 (07-04)  HR: 65 (07-04) (58 - 67)  BP: 119/82 (07-04) (119/82 - 132/83)  RR: 14 (07-04)  SpO2: 98% (07-04)  GENERAL: No acute distress, well-developed  HEAD:  Atraumatic, Normocephalic  ENT: EOMI, PERRLA, conjunctiva and sclera clear, Neck supple, No JVD, moist mucosa  CHEST/LUNG: Clear to auscultation bilaterally; No wheeze, equal breath sounds bilaterally   BACK: No spinal tenderness  HEART: Regular rate and rhythm; No murmurs, rubs, or gallops  ABDOMEN: Soft, Nontender, Nondistended; Bowel sounds present  EXTREMITIES:  No clubbing, cyanosis, or edema  PSYCH: Nl behavior, nl affect  NEUROLOGY: AAOx3, non-focal, cranial nerves intact  SKIN: Normal color, No rashes or lesions  LINES:    Labs: Personally reviewed                        13.3   6.32  )-----------( 259      ( 04 Jul 2024 00:46 )             39.8     07-04    143  |  107  |  21  ----------------------------<  106<H>  3.8   |  23  |  0.87    Ca    9.5      04 Jul 2024 00:46  Phos  4.5     07-04  Mg     2.0     07-04    TPro  7.2  /  Alb  4.1  /  TBili  0.6  /  DBili  x   /  AST  29  /  ALT  65<H>  /  AlkPhos  97  07-04    PT/INR - ( 04 Jul 2024 00:46 )   PT: 9.7 sec;   INR: 0.88 ratio         PTT - ( 04 Jul 2024 00:46 )  PTT:28.4 sec                Thyroid Stimulating Hormone, Serum: 6.40 uIU/mL (07-04 @ 00:46)     Andra Cuba IM Resident     Patient seen and evaluated at bedside    Chief Complaint: Vision changes    HPI: 44F hx of HLD Bahraini speaking ([facility] xfer) p/f progressive HAs/L eye blurry vision and headache. 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 NSGY planning OR for patient - TSP for resection of tumor. Medicine consulted for pre-operative optimization.    Patient reporting no chest pain, SOB, leg swelling, no current palpitations, no prior cardiac or lung disease. Reports hx of arrhythmia not specified evaluated with recorder and follows with cardiology however daughter reporting that the pt had minimal burden of arrhythmia. Pt only taking atorvastatin for HLD at home, no other medications. Pt able to walk up flight of stairs and walk 1 city block. No prior adverse rxns to anesthesia however daughter saying that post-op after hysterectomy pt did "take a long time to wake up." but otherwise did not have prolonged admission.     ECG with sinus rhythm.     PMHx:   Hyperlipidemia    PSHx:   H/O: hysterectomy    Allergies:  No Known Allergies    Home Meds:  Atorvastatin 20mg oral daily    Current Medications:   acetaminophen   IVPB .. 1000 milliGRAM(s) IV Intermittent every 6 hours PRN  atorvastatin 20 milliGRAM(s) Oral at bedtime  dexAMETHasone  Injectable 4 milliGRAM(s) IV Push every 6 hours  ondansetron Injectable 4 milliGRAM(s) IV Push every 6 hours PRN  oxyCODONE    IR 10 milliGRAM(s) Oral every 4 hours PRN  oxyCODONE    IR 5 milliGRAM(s) Oral every 4 hours PRN  pantoprazole  Injectable 40 milliGRAM(s) IV Push daily  polyethylene glycol 3350 17 Gram(s) Oral daily  senna 2 Tablet(s) Oral at bedtime  sodium chloride 0.9%. 1000 milliLiter(s) IV Continuous <Continuous>    FAMILY HISTORY:  FH: brain tumor (Aunt)  FH: stomach cancer  FH: thyroid disease (Grandparent)  FH: type 2 diabetes    SOCIAL HISTORY:  works as a  at [employer]  no smoking or alcohol  lives with children    REVIEW OF SYSTEMS:  CONSTITUTIONAL: No weakness, fevers or chills  EYES/ENT: No visual changes;  No dysphagia  NECK: No pain or stiffness  RESPIRATORY: No cough, wheezing, hemoptysis; No shortness of breath  CARDIOVASCULAR: No chest pain or palpitations; No lower extremity edema  GASTROINTESTINAL: No abdominal or epigastric pain. No nausea, vomiting, or hematemesis; No diarrhea or constipation. No melena or hematochezia.  BACK: No back pain  GENITOURINARY: No dysuria, frequency or hematuria  NEUROLOGICAL: No numbness or weakness  SKIN: No itching, burning, rashes, or lesions   All other review of systems is negative unless indicated above.    Physical Exam:  T(F): 98 (07-04), Max: 98 (07-04)  HR: 65 (07-04) (58 - 67)  BP: 119/82 (07-04) (119/82 - 132/83)  RR: 14 (07-04)  SpO2: 98% (07-04)  GENERAL: No acute distress, well-developed  HEAD:  Atraumatic, Normocephalic  ENT: EOMI, PERRLA, conjunctiva and sclera clear, Neck supple, No JVD, moist mucosa  CHEST/LUNG: Clear to auscultation bilaterally; No wheeze, equal breath sounds bilaterally   BACK: No spinal tenderness  HEART: Regular rate and rhythm; No murmurs, rubs, or gallops  ABDOMEN: Soft, Nontender, Nondistended; Bowel sounds present  EXTREMITIES:  No clubbing, cyanosis, or edema  PSYCH: Nl behavior, nl affect  NEUROLOGY: AAOx3, non-focal, cranial nerves intact  SKIN: Normal color, No rashes or lesions  LINES:    Labs: Personally reviewed                        13.3   6.32  )-----------( 259      ( 04 Jul 2024 00:46 )             39.8     07-04    143  |  107  |  21  ----------------------------<  106<H>  3.8   |  23  |  0.87    Ca    9.5      04 Jul 2024 00:46  Phos  4.5     07-04  Mg     2.0     07-04    TPro  7.2  /  Alb  4.1  /  TBili  0.6  /  DBili  x   /  AST  29  /  ALT  65<H>  /  AlkPhos  97  07-04    PT/INR - ( 04 Jul 2024 00:46 )   PT: 9.7 sec;   INR: 0.88 ratio         PTT - ( 04 Jul 2024 00:46 )  PTT:28.4 sec                Thyroid Stimulating Hormone, Serum: 6.40 uIU/mL (07-04 @ 00:46)

## 2024-07-05 LAB
INSULIN-LIKE GROWTH FACTOR 1 INTERPRETATION: SIGNIFICANT CHANGE UP
INSULIN-LIKE GROWTH FACTOR 1 INTERPRETATION: SIGNIFICANT CHANGE UP
INSULIN-LIKE GROWTH FACTOR 1: 155 NG/ML — SIGNIFICANT CHANGE UP (ref 64–246)
INSULIN-LIKE GROWTH FACTOR 1: 202 NG/ML — SIGNIFICANT CHANGE UP (ref 64–246)
T4 FREE SERPL-MCNC: 1 NG/DL — SIGNIFICANT CHANGE UP (ref 0.9–1.8)
THYROPEROXIDASE AB SERPL-ACNC: <10 IU/ML — SIGNIFICANT CHANGE UP

## 2024-07-05 PROCEDURE — 99233 SBSQ HOSP IP/OBS HIGH 50: CPT

## 2024-07-05 PROCEDURE — 99232 SBSQ HOSP IP/OBS MODERATE 35: CPT | Mod: 57

## 2024-07-05 RX ADMIN — POLYETHYLENE GLYCOL 3350 17 GRAM(S): 1 POWDER ORAL at 11:12

## 2024-07-05 RX ADMIN — ATORVASTATIN CALCIUM 20 MILLIGRAM(S): 20 TABLET, FILM COATED ORAL at 21:09

## 2024-07-05 RX ADMIN — MUPIROCIN 1 APPLICATION(S): 20 OINTMENT TOPICAL at 17:20

## 2024-07-05 RX ADMIN — DEXAMETHASONE 4 MILLIGRAM(S): 1 TABLET ORAL at 11:11

## 2024-07-05 RX ADMIN — DEXAMETHASONE 4 MILLIGRAM(S): 1 TABLET ORAL at 05:47

## 2024-07-05 RX ADMIN — DEXAMETHASONE 4 MILLIGRAM(S): 1 TABLET ORAL at 17:17

## 2024-07-05 RX ADMIN — DEXAMETHASONE 4 MILLIGRAM(S): 1 TABLET ORAL at 23:43

## 2024-07-05 RX ADMIN — PANTOPRAZOLE SODIUM 40 MILLIGRAM(S): 40 INJECTION, POWDER, FOR SOLUTION INTRAVENOUS at 11:12

## 2024-07-05 RX ADMIN — MUPIROCIN 1 APPLICATION(S): 20 OINTMENT TOPICAL at 05:47

## 2024-07-05 RX ADMIN — Medication 2 TABLET(S): at 21:10

## 2024-07-05 RX ADMIN — ENOXAPARIN SODIUM 40 MILLIGRAM(S): 100 INJECTION SUBCUTANEOUS at 17:17

## 2024-07-05 NOTE — PROGRESS NOTE ADULT - ASSESSMENT
44F hx of thyroid goiter, HLD presenting for progressive HAs/L eye vision deficit. 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 Endocrine consulted for: Sellar mass, possibly pituitary adenoma    #Sellar mass, possibly pituitary adenoma  1.9cm sellar mass with L optic nerve compression with visual deficits. +headachea, L visual deficits, palpitations, cold sweats. Otherwise no significant sx: No fatigue, temperature intolerance, n/v/d, constipation, lightheadedness or dizziness, galactorrhea, breast fullness. Has hx of hysterectomy.    Preop pituitary workup:  - Prolactin 153, diluted 108.  - TSH 6.40 mildly elevated, Total T4 6.8  - 2am cortisol was 4.0  - Estradiol 36, LH 7.4, FSH 9.4  - FT4 1.0    Her recent TFT's from 6/24/24 showed TSH of 3.43, FT4 of 0.9 outpatient.    Suspect that this is likely non-functioning pituitary adenoma given most pituitary hormone levels without significant abnormalities.  TSH mildly elevated, indicating pituitary function,  very recent normal TSH less than 1 week ago.  Prolactin mildly elevated likely stalk-effect from mass- size does not correlate with level   Patient was started on dexamethasone 4mg IV q6h since 5am 7/4; not able to assess HPA axis.    Recommendations  - IGF-1 ordered and pending- will follow up   - Patient on steroids. Unable to assess HPA axis while on steroids  - Patient is pending possible TSSR next week Tuesday  - Check TPO antibodies  - repeat pituitary labs when patient is post-op: FT4, TSH, IGF-1, LH, FSH, prolactin    #Thyroid goiter  She has thyroid goiter for which she follows up with Endocrinologist Dr. Espino ([facility]) since 2021. Only been monitoring with US.   - Follow up outpatient with Dr. Espino    Complex case requiring high level decision making.     Olga Lidia Meyer,    Attending Physician   Department of Endocrinology, Diabetes and Metabolism     If before 9AM or after 5PM, or on weekends/holidays, please call the Endocrine answering service for assistance ([phone]).  For nonurgent matters, please email [email or web address] for assistance.

## 2024-07-05 NOTE — PROGRESS NOTE ADULT - SUBJECTIVE AND OBJECTIVE BOX
Interval history:  Patient and daughter seen at bedside  Denies any complaints    MEDICATIONS  (STANDING):  atorvastatin 20 milliGRAM(s) Oral at bedtime  dexAMETHasone  Injectable 4 milliGRAM(s) IV Push every 6 hours  enoxaparin Injectable 40 milliGRAM(s) SubCutaneous <User Schedule>  mupirocin 2% Nasal 1 Application(s) Both Nostrils every 12 hours  pantoprazole  Injectable 40 milliGRAM(s) IV Push daily  polyethylene glycol 3350 17 Gram(s) Oral daily  senna 2 Tablet(s) Oral at bedtime    MEDICATIONS  (PRN):  acetaminophen     Tablet .. 650 milliGRAM(s) Oral every 6 hours PRN Temp greater or equal to 38.5C (101.3F), Mild Pain (1 - 3)  ondansetron Injectable 4 milliGRAM(s) IV Push every 6 hours PRN Nausea and/or Vomiting  oxyCODONE    IR 5 milliGRAM(s) Oral every 4 hours PRN Moderate Pain (4 - 6)  oxyCODONE    IR 10 milliGRAM(s) Oral every 4 hours PRN Severe Pain (7 - 10)      Allergies    No Known Allergies    Intolerances      Review of Systems:  Constitutional: No fever  Eyes: No blurry vision  Neuro: No tremors  HEENT: No pain  Cardiovascular: No chest pain, palpitations  Respiratory: No SOB, no cough  GI: No nausea, vomiting, abdominal pain  : No dysuria  Skin: no rash  Psych: no depression  Endocrine: no polyuria, polydipsia  Hem/lymph: no swelling  Osteoporosis: no fractures    ALL OTHER SYSTEMS REVIEWED AND NEGATIVE    UNABLE TO OBTAIN    PHYSICAL EXAM:  VITALS: T(C): 36.7 (07-05-24 @ 12:16)  T(F): 98 (07-05-24 @ 12:16), Max: 98.4 (07-05-24 @ 09:03)  HR: 76 (07-05-24 @ 12:16) (66 - 106)  BP: 110/74 (07-05-24 @ 12:16) (92/60 - 121/77)  RR:  (18 - 18)  SpO2:  (94% - 98%)  Wt(kg): --  GENERAL: NAD, well-groomed, well-developed  EYES: No proptosis, no lid lag, anicteric  HEENT:  Atraumatic, Normocephalic, moist mucous membranes  THYROID: Normal size, no palpable nodules  RESPIRATORY: Clear to auscultation bilaterally; No rales, rhonchi, wheezing, or rubs  CARDIOVASCULAR: Regular rate and rhythm; No murmurs; no peripheral edema  GI: Soft, nontender, non distended, normal bowel sounds  SKIN: Dry, intact, No rashes or lesions  MUSCULOSKELETAL: Full range of motion, normal strength  NEURO: sensation intact, extraocular movements intact, no tremor, normal reflexes  PSYCH: Alert and oriented x 3, normal affect, normal mood  CUSHING'S SIGNS: no striae        07-04    143  |  107  |  21  ----------------------------<  106<H>  3.8   |  23  |  0.87    eGFR: 83    Ca    9.5      07-04  Mg     2.0     07-04  Phos  4.5     07-04    TPro  7.2  /  Alb  4.1  /  TBili  0.6  /  DBili  x   /  AST  29  /  ALT  65<H>  /  AlkPhos  97  07-04          Thyroid Function Tests:  07-05 @ 06:49 TSH -- FreeT4 1.0 T3 -- Anti TPO -- Anti Thyroglobulin Ab -- TSI --  07-04 @ 06:44 TSH -- FreeT4 1.0 T3 -- Anti TPO -- Anti Thyroglobulin Ab -- TSI --

## 2024-07-05 NOTE — PROGRESS NOTE ADULT - ASSESSMENT
HPI:  44F Australian speaking ([facility] xfer) p/f progressive HAs/L eye blurry vision/short term mem loss x1mo, w/ acutely worse Lt side HA x2d. At 11AM yesterday suddenly could only see figures/shadows from Lt eye. Optho noted Lt optic nn. dysfxn/no papilledema. 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 Exam: Wide awake, Ox3, pupils dilated by optho, VFF to finger counting b/l but Lt eye can only see shadows/figure outlines, no drift o/w intact  (04 Jul 2024 02:28)      PAST MEDICAL & SURGICAL HISTORY:  Hyperlipidemia      H/O: hysterectomy            PLAN:  Neuro:   neuro and vital checks q 4   pain control with tylenol and oxycodone   decadron for cerebral edema  OR next week     Respiratory: incentive spirometry    CV: keep sbp 100-160  Endocrine: euglycemia   endocrine consult appreciated.  Most likely primary hypothyoidism.  Will follow up t4 and tpo   Heme/Onc:      stable        DVT ppx: scds, lovenox   ID: afebrile   GI:  tolerating   miralax, senna   PT/OT:following   medicine consult appreciated .        [facility] # [id number]

## 2024-07-05 NOTE — PROGRESS NOTE ADULT - SUBJECTIVE AND OBJECTIVE BOX
SUBJECTIVE: Patient seen and examined.  Feeling better since starting steroids.  Vision improving per patient.      Vital Signs Last 24 Hrs  T(C): 36.4 (05 Jul 2024 05:00), Max: 37 (04 Jul 2024 09:37)  T(F): 97.6 (05 Jul 2024 05:00), Max: 98.6 (04 Jul 2024 09:37)  HR: 76 (05 Jul 2024 05:00) (66 - 106)  BP: 92/60 (05 Jul 2024 05:00) (92/60 - 130/71)  BP(mean): --  RR: 18 (05 Jul 2024 05:00) (18 - 18)  SpO2: 96% (05 Jul 2024 05:00) (94% - 98%)    Parameters below as of 05 Jul 2024 05:00  Patient On (Oxygen Delivery Method): room air        PHYSICAL EXAM:    Constitutional: No Acute Distress     Neurological: AOx3, Following Commands, Moving all Extremities Left eye left upper quadrant opsia                                                  Sensation: [] intact to light touch  [] decreased:     Pulmonary: Clear to Auscultation, No rales, No rhonchi, No wheezes     Cardiovascular: S1, S2, Regular rate and rhythm     Gastrointestinal: Soft, Non-tender, Non-distended     Extremities: No calf tenderness     LABS:                        13.3   6.32  )-----------( 259      ( 04 Jul 2024 00:46 )             39.8    07-04    143  |  107  |  21  ----------------------------<  106<H>  3.8   |  23  |  0.87    Ca    9.5      04 Jul 2024 00:46  Phos  4.5     07-04  Mg     2.0     07-04    TPro  7.2  /  Alb  4.1  /  TBili  0.6  /  DBili  x   /  AST  29  /  ALT  65<H>  /  AlkPhos  97  07-04  PT/INR - ( 04 Jul 2024 00:46 )   PT: 9.7 sec;   INR: 0.88 ratio         PTT - ( 04 Jul 2024 00:46 )  PTT:28.4 sec      MEDICATIONS:  Anticoagulation:   enoxaparin Injectable 40 milliGRAM(s) SubCutaneous <User Schedule>    Antibiotics:    Endo:  atorvastatin 20 milliGRAM(s) Oral at bedtime  dexAMETHasone  Injectable 4 milliGRAM(s) IV Push every 6 hours    Neuro:  acetaminophen     Tablet .. 650 milliGRAM(s) Oral every 6 hours PRN Temp greater or equal to 38.5C (101.3F), Mild Pain (1 - 3)  ondansetron Injectable 4 milliGRAM(s) IV Push every 6 hours PRN Nausea and/or Vomiting  oxyCODONE    IR 10 milliGRAM(s) Oral every 4 hours PRN Severe Pain (7 - 10)  oxyCODONE    IR 5 milliGRAM(s) Oral every 4 hours PRN Moderate Pain (4 - 6)    Cardiac:    Pulm:    GI/:  pantoprazole  Injectable 40 milliGRAM(s) IV Push daily  polyethylene glycol 3350 17 Gram(s) Oral daily  senna 2 Tablet(s) Oral at bedtime    Other:   mupirocin 2% Nasal 1 Application(s) Both Nostrils every 12 hours    DIET: Regular

## 2024-07-06 LAB
ANION GAP SERPL CALC-SCNC: 15 MMOL/L — SIGNIFICANT CHANGE UP (ref 5–17)
APTT BLD: 24.8 SEC — SIGNIFICANT CHANGE UP (ref 24.5–35.6)
BUN SERPL-MCNC: 23 MG/DL — SIGNIFICANT CHANGE UP (ref 7–23)
CALCIUM SERPL-MCNC: 8.9 MG/DL — SIGNIFICANT CHANGE UP (ref 8.4–10.5)
CHLORIDE SERPL-SCNC: 107 MMOL/L — SIGNIFICANT CHANGE UP (ref 96–108)
CHOLEST SERPL-MCNC: 245 MG/DL — HIGH
CO2 SERPL-SCNC: 18 MMOL/L — LOW (ref 22–31)
CREAT SERPL-MCNC: 0.74 MG/DL — SIGNIFICANT CHANGE UP (ref 0.5–1.3)
EGFR: 101 ML/MIN/1.73M2 — SIGNIFICANT CHANGE UP
GLUCOSE SERPL-MCNC: 161 MG/DL — HIGH (ref 70–99)
HCT VFR BLD CALC: 38.3 % — SIGNIFICANT CHANGE UP (ref 34.5–45)
HDLC SERPL-MCNC: 75 MG/DL — SIGNIFICANT CHANGE UP
HGB BLD-MCNC: 13.2 G/DL — SIGNIFICANT CHANGE UP (ref 11.5–15.5)
INR BLD: 0.92 RATIO — SIGNIFICANT CHANGE UP (ref 0.85–1.18)
LIPID PNL WITH DIRECT LDL SERPL: 156 MG/DL — HIGH
MCHC RBC-ENTMCNC: 30.6 PG — SIGNIFICANT CHANGE UP (ref 27–34)
MCHC RBC-ENTMCNC: 34.5 GM/DL — SIGNIFICANT CHANGE UP (ref 32–36)
MCV RBC AUTO: 88.7 FL — SIGNIFICANT CHANGE UP (ref 80–100)
NON HDL CHOLESTEROL: 170 MG/DL — HIGH
NRBC # BLD: 0 /100 WBCS — SIGNIFICANT CHANGE UP (ref 0–0)
PLATELET # BLD AUTO: 268 K/UL — SIGNIFICANT CHANGE UP (ref 150–400)
POTASSIUM SERPL-MCNC: 3.8 MMOL/L — SIGNIFICANT CHANGE UP (ref 3.5–5.3)
POTASSIUM SERPL-SCNC: 3.8 MMOL/L — SIGNIFICANT CHANGE UP (ref 3.5–5.3)
PROTHROM AB SERPL-ACNC: 10.1 SEC — SIGNIFICANT CHANGE UP (ref 9.5–13)
RBC # BLD: 4.32 M/UL — SIGNIFICANT CHANGE UP (ref 3.8–5.2)
RBC # FLD: 13.6 % — SIGNIFICANT CHANGE UP (ref 10.3–14.5)
SODIUM SERPL-SCNC: 140 MMOL/L — SIGNIFICANT CHANGE UP (ref 135–145)
TRIGL SERPL-MCNC: 84 MG/DL — SIGNIFICANT CHANGE UP
WBC # BLD: 11.5 K/UL — HIGH (ref 3.8–10.5)
WBC # FLD AUTO: 11.5 K/UL — HIGH (ref 3.8–10.5)

## 2024-07-06 RX ADMIN — DEXAMETHASONE 4 MILLIGRAM(S): 1 TABLET ORAL at 11:24

## 2024-07-06 RX ADMIN — DEXAMETHASONE 4 MILLIGRAM(S): 1 TABLET ORAL at 05:04

## 2024-07-06 RX ADMIN — MUPIROCIN 1 APPLICATION(S): 20 OINTMENT TOPICAL at 17:29

## 2024-07-06 RX ADMIN — MUPIROCIN 1 APPLICATION(S): 20 OINTMENT TOPICAL at 05:04

## 2024-07-06 RX ADMIN — PANTOPRAZOLE SODIUM 40 MILLIGRAM(S): 40 INJECTION, POWDER, FOR SOLUTION INTRAVENOUS at 11:24

## 2024-07-06 RX ADMIN — DEXAMETHASONE 4 MILLIGRAM(S): 1 TABLET ORAL at 23:02

## 2024-07-06 RX ADMIN — POLYETHYLENE GLYCOL 3350 17 GRAM(S): 1 POWDER ORAL at 11:25

## 2024-07-06 RX ADMIN — DEXAMETHASONE 4 MILLIGRAM(S): 1 TABLET ORAL at 17:29

## 2024-07-06 RX ADMIN — Medication 650 MILLIGRAM(S): at 23:02

## 2024-07-06 RX ADMIN — ATORVASTATIN CALCIUM 20 MILLIGRAM(S): 20 TABLET, FILM COATED ORAL at 23:02

## 2024-07-06 NOTE — PROGRESS NOTE ADULT - SUBJECTIVE AND OBJECTIVE BOX
SUBJECTIVE: Patient seen and examined.   Vital Signs Last 24 Hrs  T(C): 36.4 (05 Jul 2024 05:00), Max: 37 (04 Jul 2024 09:37)  T(F): 97.6 (05 Jul 2024 05:00), Max: 98.6 (04 Jul 2024 09:37)  HR: 76 (05 Jul 2024 05:00) (66 - 106)  BP: 92/60 (05 Jul 2024 05:00) (92/60 - 130/71)  BP(mean): --  RR: 18 (05 Jul 2024 05:00) (18 - 18)  SpO2: 96% (05 Jul 2024 05:00) (94% - 98%)    Parameters below as of 05 Jul 2024 05:00  Patient On (Oxygen Delivery Method): room air        PHYSICAL EXAM:    Constitutional: No Acute Distress     Neurological: AOx3, Following Commands, Moving all Extremities Left eye left upper quadrant opsia                                                  Sensation: [] intact to light touch  [] decreased:     Pulmonary: Clear to Auscultation, No rales, No rhonchi, No wheezes     Cardiovascular: S1, S2, Regular rate and rhythm     Gastrointestinal: Soft, Non-tender, Non-distended     Extremities: No calf tenderness     LABS:                        13.3   6.32  )-----------( 259      ( 04 Jul 2024 00:46 )             39.8    07-04    143  |  107  |  21  ----------------------------<  106<H>  3.8   |  23  |  0.87    Ca    9.5      04 Jul 2024 00:46  Phos  4.5     07-04  Mg     2.0     07-04    TPro  7.2  /  Alb  4.1  /  TBili  0.6  /  DBili  x   /  AST  29  /  ALT  65<H>  /  AlkPhos  97  07-04  PT/INR - ( 04 Jul 2024 00:46 )   PT: 9.7 sec;   INR: 0.88 ratio         PTT - ( 04 Jul 2024 00:46 )  PTT:28.4 sec      MEDICATIONS:  Anticoagulation:   enoxaparin Injectable 40 milliGRAM(s) SubCutaneous <User Schedule>    Antibiotics:    Endo:  atorvastatin 20 milliGRAM(s) Oral at bedtime  dexAMETHasone  Injectable 4 milliGRAM(s) IV Push every 6 hours    Neuro:  acetaminophen     Tablet .. 650 milliGRAM(s) Oral every 6 hours PRN Temp greater or equal to 38.5C (101.3F), Mild Pain (1 - 3)  ondansetron Injectable 4 milliGRAM(s) IV Push every 6 hours PRN Nausea and/or Vomiting  oxyCODONE    IR 10 milliGRAM(s) Oral every 4 hours PRN Severe Pain (7 - 10)  oxyCODONE    IR 5 milliGRAM(s) Oral every 4 hours PRN Moderate Pain (4 - 6)    Cardiac:    Pulm:    GI/:  pantoprazole  Injectable 40 milliGRAM(s) IV Push daily  polyethylene glycol 3350 17 Gram(s) Oral daily  senna 2 Tablet(s) Oral at bedtime    Other:   mupirocin 2% Nasal 1 Application(s) Both Nostrils every 12 hours    DIET: Regular

## 2024-07-06 NOTE — PROGRESS NOTE ADULT - ASSESSMENT
HPI:  44F Thai speaking ([facility] xfer) p/f progressive HAs/L eye blurry vision/short term mem loss x1mo, w/ acutely worse Lt side HA x2d. At 11AM yesterday suddenly could only see figures/shadows from Lt eye. Optho noted Lt optic nn. dysfxn/no papilledema. 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 Exam: Wide awake, Ox3, pupils dilated by optho, VFF to finger counting b/l but Lt eye can only see shadows/figure outlines, no drift o/w intact  (04 Jul 2024 02:28)      PAST MEDICAL & SURGICAL HISTORY:  Hyperlipidemia      H/O: hysterectomy            PLAN:  Neuro:   neuro and vital checks q 4   pain control with tylenol and oxycodone   decadron for cerebral edema  OR next week     Respiratory: incentive spirometry    CV: keep sbp 100-160. Patient normally slightly hypotensive.   Endocrine: euglycemia   endocrine following: f/u IGF-1 and free T4  Heme/Onc:      stable        DVT ppx: scds, lovenox   ID: afebrile   GI:  tolerating   miralax, senna   PT/OT:following

## 2024-07-07 DIAGNOSIS — Z29.9 ENCOUNTER FOR PROPHYLACTIC MEASURES, UNSPECIFIED: ICD-10-CM

## 2024-07-07 PROCEDURE — 99232 SBSQ HOSP IP/OBS MODERATE 35: CPT | Mod: 57

## 2024-07-07 RX ORDER — POLYVINYL ALCOHOL, POVIDONE .5; .6 G/100ML; G/100ML
1 SOLUTION OPHTHALMIC THREE TIMES A DAY
Refills: 0 | Status: COMPLETED | OUTPATIENT
Start: 2024-07-07 | End: 2024-07-10

## 2024-07-07 RX ADMIN — DEXAMETHASONE 4 MILLIGRAM(S): 1 TABLET ORAL at 23:06

## 2024-07-07 RX ADMIN — MUPIROCIN 1 APPLICATION(S): 20 OINTMENT TOPICAL at 05:08

## 2024-07-07 RX ADMIN — MUPIROCIN 1 APPLICATION(S): 20 OINTMENT TOPICAL at 17:26

## 2024-07-07 RX ADMIN — Medication 650 MILLIGRAM(S): at 21:27

## 2024-07-07 RX ADMIN — PANTOPRAZOLE SODIUM 40 MILLIGRAM(S): 40 INJECTION, POWDER, FOR SOLUTION INTRAVENOUS at 11:13

## 2024-07-07 RX ADMIN — POLYVINYL ALCOHOL, POVIDONE 1 DROP(S): .5; .6 SOLUTION OPHTHALMIC at 21:27

## 2024-07-07 RX ADMIN — DEXAMETHASONE 4 MILLIGRAM(S): 1 TABLET ORAL at 17:27

## 2024-07-07 RX ADMIN — Medication 650 MILLIGRAM(S): at 01:08

## 2024-07-07 RX ADMIN — ATORVASTATIN CALCIUM 20 MILLIGRAM(S): 20 TABLET, FILM COATED ORAL at 21:27

## 2024-07-07 RX ADMIN — POLYETHYLENE GLYCOL 3350 17 GRAM(S): 1 POWDER ORAL at 11:14

## 2024-07-07 RX ADMIN — DEXAMETHASONE 4 MILLIGRAM(S): 1 TABLET ORAL at 05:08

## 2024-07-07 RX ADMIN — Medication 650 MILLIGRAM(S): at 11:37

## 2024-07-07 RX ADMIN — Medication 2 TABLET(S): at 21:27

## 2024-07-07 RX ADMIN — Medication 650 MILLIGRAM(S): at 22:30

## 2024-07-07 RX ADMIN — POLYVINYL ALCOHOL, POVIDONE 1 DROP(S): .5; .6 SOLUTION OPHTHALMIC at 11:42

## 2024-07-07 RX ADMIN — DEXAMETHASONE 4 MILLIGRAM(S): 1 TABLET ORAL at 11:14

## 2024-07-07 RX ADMIN — Medication 650 MILLIGRAM(S): at 10:25

## 2024-07-07 NOTE — PROGRESS NOTE ADULT - SUBJECTIVE AND OBJECTIVE BOX
SUBJECTIVE:   Patient was seen and evaluated at bedside. Patient is resting in bed and is in no new acute distress.   OVERNIGHT EVENTS:   none   Vital Signs Last 24 Hrs  T(C): 36.8 (07 Jul 2024 05:00), Max: 37.1 (06 Jul 2024 15:43)  T(F): 98.2 (07 Jul 2024 05:00), Max: 98.8 (06 Jul 2024 15:43)  HR: 55 (07 Jul 2024 05:00) (55 - 76)  BP: 101/66 (07 Jul 2024 05:00) (98/64 - 121/71)  BP(mean): --  RR: 18 (07 Jul 2024 05:00) (18 - 18)  SpO2: 96% (07 Jul 2024 05:00) (95% - 98%)    Parameters below as of 07 Jul 2024 05:00  Patient On (Oxygen Delivery Method): room air        PHYSICAL EXAM:    General: No Acute Distress     Neurological: Awake, alert oriented to person, place and time, Following Commands, PERRL, EOMI, has left upper quadrantopia  Face Symmetrical, Speech Fluent, Moving all extremities, Muscle Strength normal in all four extremities, No Drift, Sensation to Light Touch Intact    Pulmonary: Clear to Auscultation, No Rales, No Rhonchi, No Wheezes     Cardiovascular: S1, S2, Regular Rate and Rhythm     Gastrointestinal: Soft, Nontender, Nondistended     Incision:   none   LABS:                        13.2   11.50 )-----------( 268      ( 06 Jul 2024 06:43 )             38.3    07-06    140  |  107  |  23  ----------------------------<  161<H>  3.8   |  18<L>  |  0.74    Ca    8.9      06 Jul 2024 06:43    PT/INR - ( 06 Jul 2024 06:43 )   PT: 10.1 sec;   INR: 0.92 ratio         PTT - ( 06 Jul 2024 06:43 )  PTT:24.8 sec      07-06 @ 07:01  -  07-07 @ 07:00  --------------------------------------------------------  IN: 830 mL / OUT: 0 mL / NET: 830 mL      DRAINS:     MEDICATIONS:  Antibiotics:    Neuro:  acetaminophen     Tablet .. 650 milliGRAM(s) Oral every 6 hours PRN Temp greater or equal to 38.5C (101.3F), Mild Pain (1 - 3)  ondansetron Injectable 4 milliGRAM(s) IV Push every 6 hours PRN Nausea and/or Vomiting  oxyCODONE    IR 5 milliGRAM(s) Oral every 4 hours PRN Moderate Pain (4 - 6)  oxyCODONE    IR 10 milliGRAM(s) Oral every 4 hours PRN Severe Pain (7 - 10)    Cardiac:    Pulm:    GI/:  pantoprazole  Injectable 40 milliGRAM(s) IV Push daily  polyethylene glycol 3350 17 Gram(s) Oral daily  senna 2 Tablet(s) Oral at bedtime    Other:   atorvastatin 20 milliGRAM(s) Oral at bedtime  dexAMETHasone  Injectable 4 milliGRAM(s) IV Push every 6 hours  mupirocin 2% Nasal 1 Application(s) Both Nostrils every 12 hours    DIET: [] Regular [] CCD [] Renal [] Puree [] Dysphagia [] Tube Feeds:   regular   IMAGING:   ACC: [id number] EXAM:  MR ORBITS ONLY WAWIC   ORDERED BY:  CYNDY CASTILLO     ACC: [id number] EXAM:  MR BRAIN WAW IC FOR SRS   ORDERED BY:  CYNDY CASTILLO     PROCEDURE DATE:  07/04/2024          INTERPRETATION:  CLINICAL INDICATION: Left-sided headache, blurry vision    Magnetic resonance imaging of the brain was carried out with transaxial   SPGR, FLAIR, fast spin echo T2 weighted images, axial susceptibility   weighted series, diffusion weighted series and sagittal T1 weighted   series on a 1.5 Lisa magnet. Post contrast axial, coronal and sagittal   T1 weighted images were obtained. 7.5 cc of Gadavist were intravenously   injected, 0 cc were discarded.  Thin sections were obtained through the sella with sagittal and coronal   plane.      Comparison is made with the prior CT brain of 7/3/2024.    There is a large mass in the sella inseparable from the pituitary gland   extending into the suprasellar cistern. There is homogeneous enhancement   consistent with a macroadenoma. The mass also extends superior to the   left cavernous and supraclinoid internal carotid artery extending into   the left inferior frontal lobe. The mass measures approximately 1.6 cm in   AP diameter by 2.4 cm transversely by 2.2 cm in craniocaudal diameter.   There is no evidence to suggest necrosis or pituitary apoplexy. There is   mass effect on the optic chiasm bilaterally and the left precavernous   optic nerves as well as the left gyrus rectus.    Ventricles and sulci normal for the patient's age. No acute infarcts are   seen. A few nonspecific white matter changes are identified. There is no   old or new hemorrhage.    After contrast administration is normal intracranial vascular   enhancement. No abnormal parenchymal or leptomeningealenhancement is   identified.    Soft tissues present in the right maxillary sinus with scattered ethmoid   air cell opacification.    IMPRESSION: Large homogeneously thin enhancing sellar mass extending into   the suprasellar cistern with optic chiasm compression as well as   extending into the inferior left frontal lobe extra-axially compressing   the left prechiasmal optic nerve consistent with a pituitary   macroadenoma. No evidence of pituitary apoplexy.    --- End of Report ---            JOSE A COLEMAN MD; Attending Radiologist  This document has been electronically signed. Jul 4 2024  1:06PM

## 2024-07-07 NOTE — PROGRESS NOTE ADULT - NSPROGADDITIONALINFOA_GEN_ALL_CORE
30 minutes spent. more than 50 percent of time was spent on examining patient, reviewed labs images and spoke plan with patient and family.

## 2024-07-07 NOTE — PROGRESS NOTE ADULT - ASSESSMENT
HPI:  44F Italian speaking ([facility] xfer) p/f progressive HAs/L eye blurry vision/short term mem loss x1mo, w/ acutely worse Lt side HA x2d. At 11AM yesterday suddenly could only see figures/shadows from Lt eye. Optho noted Lt optic nn. dysfxn/no papilledema. 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 Exam: Wide awake, Ox3, pupils dilated by optho, VFF to finger counting b/l but Lt eye can only see shadows/figure outlines, no drift o/w intact  (04 Jul 2024 02:28)    PROCEDURE:  pre op planning       PLAN:  1 OR likely on 7/11   2 medicine cleared   3 decadron to alleviate compression from tumor on optic chiasm   4 prn pain meds  5 room air   6 bp stable   7 regular diet   8 senna and miralax   9 afebrile   10 LED  negative on 7/4   11 dvt ppx scds -no chemical dvt ppx -patient is very ambulatory   12 dispo :p   -will discuss with Dr. Goncalves   -PPLCONNECT [id number]

## 2024-07-08 PROCEDURE — 99232 SBSQ HOSP IP/OBS MODERATE 35: CPT

## 2024-07-08 PROCEDURE — 99223 1ST HOSP IP/OBS HIGH 75: CPT

## 2024-07-08 RX ORDER — PANTOPRAZOLE SODIUM 40 MG/10ML
40 INJECTION, POWDER, FOR SOLUTION INTRAVENOUS
Refills: 0 | Status: DISCONTINUED | OUTPATIENT
Start: 2024-07-08 | End: 2024-07-11

## 2024-07-08 RX ORDER — POLYETHYLENE GLYCOL 3350 1 G/G
17 POWDER ORAL EVERY 12 HOURS
Refills: 0 | Status: DISCONTINUED | OUTPATIENT
Start: 2024-07-08 | End: 2024-07-11

## 2024-07-08 RX ORDER — ENOXAPARIN SODIUM 100 MG/ML
40 INJECTION SUBCUTANEOUS
Refills: 0 | Status: DISCONTINUED | OUTPATIENT
Start: 2024-07-08 | End: 2024-07-10

## 2024-07-08 RX ADMIN — ATORVASTATIN CALCIUM 20 MILLIGRAM(S): 20 TABLET, FILM COATED ORAL at 21:30

## 2024-07-08 RX ADMIN — Medication 650 MILLIGRAM(S): at 22:01

## 2024-07-08 RX ADMIN — POLYVINYL ALCOHOL, POVIDONE 1 DROP(S): .5; .6 SOLUTION OPHTHALMIC at 21:31

## 2024-07-08 RX ADMIN — MUPIROCIN 1 APPLICATION(S): 20 OINTMENT TOPICAL at 05:11

## 2024-07-08 RX ADMIN — POLYVINYL ALCOHOL, POVIDONE 1 DROP(S): .5; .6 SOLUTION OPHTHALMIC at 05:11

## 2024-07-08 RX ADMIN — MUPIROCIN 1 APPLICATION(S): 20 OINTMENT TOPICAL at 17:40

## 2024-07-08 RX ADMIN — Medication 2 TABLET(S): at 21:31

## 2024-07-08 RX ADMIN — Medication 650 MILLIGRAM(S): at 21:31

## 2024-07-08 RX ADMIN — DEXAMETHASONE 4 MILLIGRAM(S): 1 TABLET ORAL at 05:11

## 2024-07-08 RX ADMIN — POLYVINYL ALCOHOL, POVIDONE 1 DROP(S): .5; .6 SOLUTION OPHTHALMIC at 12:02

## 2024-07-08 RX ADMIN — DEXAMETHASONE 4 MILLIGRAM(S): 1 TABLET ORAL at 17:40

## 2024-07-08 RX ADMIN — ENOXAPARIN SODIUM 40 MILLIGRAM(S): 100 INJECTION SUBCUTANEOUS at 17:46

## 2024-07-08 RX ADMIN — DEXAMETHASONE 4 MILLIGRAM(S): 1 TABLET ORAL at 12:02

## 2024-07-08 NOTE — PROGRESS NOTE ADULT - SUBJECTIVE AND OBJECTIVE BOX
[facility] DEPARTMENT OF OPHTHALMOLOGY  ------------------------------------------------------------------------------  Raz Rodriguez MD PGY-2  ------------------------------------------------------------------------------    Interval History: Since admission, patient started on IV decadron with plan for OR possibly 7/11. Patient Endorses vision has signficantly improved since inital admission. However, states that on Saturday developed foreign body sensation OS as if something got into her eye. Eye became red at the time and vision became blurry. She also noted that she developed a white lesion on her eye that had increased in size since she was admitted. States lesion first developed 1 month ago and has been gradually increasing in size. Today, vision is stable although patient endorses ongoing headache. Denies any flashes, floaters, or discharge    MEDICATIONS  (STANDING):  artificial tears (preservative free) Ophthalmic Solution 1 Drop(s) Left EYE three times a day  atorvastatin 20 milliGRAM(s) Oral at bedtime  dexAMETHasone  Injectable 4 milliGRAM(s) IV Push every 6 hours  enoxaparin Injectable 40 milliGRAM(s) SubCutaneous <User Schedule>  mupirocin 2% Nasal 1 Application(s) Both Nostrils every 12 hours  pantoprazole    Tablet 40 milliGRAM(s) Oral before breakfast  polyethylene glycol 3350 17 Gram(s) Oral every 12 hours  senna 2 Tablet(s) Oral at bedtime    MEDICATIONS  (PRN):  acetaminophen     Tablet .. 650 milliGRAM(s) Oral every 6 hours PRN Temp greater or equal to 38.5C (101.3F), Mild Pain (1 - 3)  ondansetron Injectable 4 milliGRAM(s) IV Push every 6 hours PRN Nausea and/or Vomiting  oxyCODONE    IR 10 milliGRAM(s) Oral every 4 hours PRN Severe Pain (7 - 10)  oxyCODONE    IR 5 milliGRAM(s) Oral every 4 hours PRN Moderate Pain (4 - 6)      VITALS: T(C): 36.7 (07-08-24 @ 20:38)  T(F): 98 (07-08-24 @ 20:38), Max: 98.3 (07-08-24 @ 09:25)  HR: 70 (07-08-24 @ 20:38) (62 - 70)  BP: 113/69 (07-08-24 @ 20:38) (96/60 - 118/77)  RR:  (18 - 18)  SpO2:  (95% - 97%)  Wt(kg): --  General: AAO x 3, appropriate mood and affect    Ophthalmology Exam:  Visual acuity (sc): 20/20 OD 20/20 OS  Pupils: PERRL OU, no APD  Ttono: 8 OD 11 OS  Extraocular movements (EOMs): Full OU, no pain, no diplopia  Confrontational Visual Field (CVF): Full OU    Pen Light Exam (PLE)  External: Flat OU  Lids/Lashes/Lacrimal Ducts: Flat OU    Sclera/Conjunctiva: W+Q OU  Cornea: SPK OU, Nasal limbus white lesion extending over the cornea without invading visual axis OS   Anterior Chamber: D+F OU    Iris: Flat OU  Lens: Cl OU    Fundus Exam: dilated with 1% tropicamide and 2.5% phenylephrine  Approval obtained from primary team for dilation  Patient aware that pupils can remained dilated for at least 4-6 hours  Exam performed with 20D lens    Vitreous: wnl OU  Disc, cup/disc: sharp and pink, 0.4 OU  Macula: wnl OU  Vessels: wnl OU  Periphery: wnl OU

## 2024-07-08 NOTE — PROGRESS NOTE ADULT - ASSESSMENT
ASSESSMENT AND PLAN: 44F Kitt[place] speaking ([facility] xfer) p/f progressive HAs/L eye blurry vision/short term mem loss x1mo, w/ acutely worse Lt side HA x2d. At 11AM yesterday suddenly could only see figures/shadows from Lt eye. Optho noted Lt optic nn. dysfxn/no papilledema. 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     NEURO:   - Continue neuro checks q 4  - Pre-op for transphenoidal for pituitary tumor resection with Dr. Goncalves (Medically optimized on medicine note 7/4)  - Continue Decadron 4mg every 6 hours for cerebral edema  - 7/4 MR Brain and Orbits: large homogenously thin enhancing sellar mass extending into the suprasellar cistern w/ optic chiasm compression as well as extending into the inferior left frontal lobe extra-axially compression the left prechiasmal optic nerve consistent w/ pituitary macroadenoma. No evidence of pituitary apoplexy  - ENT following   - Ophthalmology following, appreciate their recs  - Pain control w/ Tylenol prn and Oxycodone prn  - PT: no skilled needs, OT: outpatient for vision; patient will need re-eval post-op    PULM:   - On room air, O2Sat>92%  - Incentive spirometry    CV:  - -160, mild hypotension SBP 96 early this morning, asymptomatic  - Continue Lipitor for HLD    ENDO:   - Appreciate Endocrine following - suspect that this is likely non-functioning pituitary adenoma given most pituitary hormone levels without significant abnormalities. Patient also has a thyroid goiter for which she follows up with Endocrinologist Dr. Espino ([facility]) since 2021. Only been monitoring with US. Follow up outpatient with Dr. Espino    HEME/ONC:             7/6 CBC mild leukocytosis likely from steroids, afebrile         DVT ppx: SQL, SCDs, 7/4 Le Dopp negative    RENAL:   - Afebrile  - 7/6 BMP stable  - Voiding    ID:   - Afebrile  - On Bactroban for +MSSA nares 7/4    GI:    - Continue oral diet  - Senna and Miralax for bowel regimen  - Continue Protonix for GI ppx while on steroids    Appreciate Internal Medicine following for co-medical management.     More than 30 minutes were spent educating the patient and family regarding condition, medications, follow up plans, signs and symptoms to be concerned with, preparing paperwork, and questions answered regarding current disposition.     DISCHARGE PLANNING:   Dispo pending OR    Plan to be discussed w/ Dr. Goncalves  [id number]

## 2024-07-08 NOTE — PROGRESS NOTE ADULT - SUBJECTIVE AND OBJECTIVE BOX
SUBJECTIVE: HPI:  44F [place]ese speaking ([facility] xfer) p/f progressive HAs/L eye blurry vision/short term mem loss x1mo, w/ acutely worse Lt side HA x2d. At 11AM yesterday suddenly could only see figures/shadows from Lt eye. Optho noted Lt optic nn. dysfxn/no papilledema. 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 Exam: Wide awake, Ox3, pupils dilated by optho, VFF to finger counting b/l but Lt eye can only see shadows/figure outlines, no drift o/w intact  (04 Jul 2024 02:28)      OVERNIGHT EVENTS: No acute events overnight. Patient seen and evaluated at bedside with her daughter. Offered  but patient preferred family to translate for exam. Awaiting surgery this Thursday.     Vital Signs Last 24 Hrs  T(C): 36.8 (08 Jul 2024 12:52), Max: 36.8 (07 Jul 2024 18:20)  T(F): 98.3 (08 Jul 2024 12:52), Max: 98.3 (07 Jul 2024 18:20)  HR: 70 (08 Jul 2024 12:52) (62 - 70)  BP: 118/77 (08 Jul 2024 12:52) (96/60 - 118/77)  BP(mean): --  RR: 18 (08 Jul 2024 12:52) (18 - 18)  SpO2: 97% (08 Jul 2024 12:52) (92% - 97%)    Parameters below as of 08 Jul 2024 12:52  Patient On (Oxygen Delivery Method): room air        PHYSICAL EXAM:    General: No Acute Distress     Neurological: Awake, OX3 (name, place, date), pupils 3mm react b/l, EOMI, left upper quadrantopsia, following commands, no drift, uppers 5/5, lowers 5/5, SILT    Pulmonary: Clear to Auscultation, No Rales, No Rhonchi, No Wheezes     Cardiovascular: S1, S2, Regular Rate and Rhythm     Gastrointestinal: Soft, Nontender, Nondistended     Incision: None    LABS:             07-08 @ 07:01  -  07-08 @ 16:24  --------------------------------------------------------  IN: 600 mL / OUT: 0 mL / NET: 600 mL      DRAINS: None    MEDICATIONS:  Antibiotics:    Neuro:  acetaminophen     Tablet .. 650 milliGRAM(s) Oral every 6 hours PRN Temp greater or equal to 38.5C (101.3F), Mild Pain (1 - 3)  ondansetron Injectable 4 milliGRAM(s) IV Push every 6 hours PRN Nausea and/or Vomiting  oxyCODONE    IR 5 milliGRAM(s) Oral every 4 hours PRN Moderate Pain (4 - 6)  oxyCODONE    IR 10 milliGRAM(s) Oral every 4 hours PRN Severe Pain (7 - 10)    Cardiac:    Pulm:    GI/:  pantoprazole    Tablet 40 milliGRAM(s) Oral before breakfast  polyethylene glycol 3350 17 Gram(s) Oral every 12 hours  senna 2 Tablet(s) Oral at bedtime    Other:   artificial tears (preservative free) Ophthalmic Solution 1 Drop(s) Left EYE three times a day  atorvastatin 20 milliGRAM(s) Oral at bedtime  dexAMETHasone  Injectable 4 milliGRAM(s) IV Push every 6 hours  enoxaparin Injectable 40 milliGRAM(s) SubCutaneous <User Schedule>  mupirocin 2% Nasal 1 Application(s) Both Nostrils every 12 hours    DIET: [x] Regular [] CCD [] Renal [] Puree [] Dysphagia [] Tube Feeds:     IMAGING:   < from: MR Brain Stereotactic w/wo IV Cont (07.04.24 @ 03:52) >  IMPRESSION: Large homogeneously thin enhancing sellar mass extending into   the suprasellar cistern with optic chiasm compression as well as   extending into the inferior left frontal lobe extra-axially compressing   the left prechiasmal optic nerve consistent with a pituitary   macroadenoma. No evidence of pituitary apoplexy.    --- End of Report ---    JOSE A COLEMAN MD; Attending Radiologist  This document has been electronically signed. Jul 4 2024  1:06PM    < end of copied text >    < from: [facility] Duplex Lower Ext Vein Scan, Bilat (07.04.24 @ 14:18) >  IMPRESSION:  No evidence of deep venous thrombosis in either lower extremity.    --- End of Report ---       BROCK PERRY MD; Attending Radiologist  This document has been electronically signed. Jul 4 2024  2:34PM    < end of copied text >

## 2024-07-08 NOTE — PROGRESS NOTE ADULT - ASSESSMENT
43 yo female with pituitary mass and left optic nerve dysfunction.     Pituitary mass, left optic nerve dysfunction   - with headaches and blurry vision worsening over past month.   - On admission, Va ph 20/40 OD, 20/400 OS, there is an APD OS, HILARIA color plates OS due to poor vision, visual fields to confrontation are full OU   - Since starting Decadron, VA significantly improved to 20/20 OU, color plates full, no APD OS  - Repeat exam with normal appearing optic nerves and retina. No nerve edema or pallor.   - Patient with optic nerve dysfunction as reflected by poor vision and APD, and it is likely secondary to enlargement of pituitary mass with subsequent impingement of left optic nerve leading to left eye vision loss. Symptoms likely improved due to decreased swelling 2/2 to IV Decadron use  - MR Brain and Orbits: large homogenously thin enhancing sellar mass extending into the suprasellar cistern w/ optic chiasm compression as well as extending into the inferior left frontal lobe extra-axially compression the left prechiasmal optic nerve consistent w/ pituitary macroadenoma. No evidence of pituitary apoplexy  - Neurosurgery planning for OR 7/11  - Endocrine workup consistent with non-functioning pituitary adenoma  - Patient will require formal outpatient ophthalmology evaluation once patient is discharged    #Foreign Body Sensation OS  #White limbal lesion OS  - Patient endorsing new FBS since Saturday with irritation that has improved  - Also endorsing white spot on her cornea which has grown in size since admission and was first noted 1 month ago  - Patient denies any history of eye surgeries  - Ant exam with SPK OU as well as white nasal limbal lesion OS that extends over the cornea without invading visual axis. No epi defect noted or ulceration. Eye is otherwise white and quiet   - DDx for FBS includes dry eye vs foreign body vs pterygium. Less likely foreign body as no evidence of eye irritation or corneal abrasion or ulcer noted. May also be dryness related to white limbal lesion with inadequate tear film distribution.   - Etiology of white limbal lesion unclear at this time. May be suggestive of pterygium vs JOSE vs papilloma vs Dry eye. Patient likely Low concern for corneal ulcer at this time.  - Recommend preservative free artificial tears 4-6x per day in both eyes   - Recommend Lacrilube at bedtime in both eyes  - Will continue to follow  - Patient will require outpatient ophthalmology evaluation within 1 week of discharge for further characterization of nasal lesion    Outpatient follow-up: Patient should follow-up with his/her ophthalmologist or with [facility] Department of Ophthalmology upon discharge at the address below     [facility] Department of Ophthalmology  [address] [place] [address]. [address]  [place] [address]  [phone]   43 yo female with pituitary mass and left optic nerve dysfunction.     Pituitary mass, left optic nerve dysfunction   - with headaches and blurry vision worsening over past month.   - On admission, Va ph 20/40 OD, 20/400 OS, there is an APD OS, HILARIA color plates OS due to poor vision, visual fields to confrontation are full OU   - Since starting Decadron, VA significantly improved to 20/20 OU, color plates full, no APD OS  - Repeat exam with normal appearing optic nerves and retina. No nerve edema or pallor.   - Patient with optic nerve dysfunction as reflected by poor vision and APD, and it is likely secondary to enlargement of pituitary mass with subsequent impingement of left optic nerve leading to left eye vision loss. Symptoms likely improved due to decreased swelling 2/2 to IV Decadron use  - MR Brain and Orbits: large homogenously thin enhancing sellar mass extending into the suprasellar cistern w/ optic chiasm compression as well as extending into the inferior left frontal lobe extra-axially compression the left prechiasmal optic nerve consistent w/ pituitary macroadenoma. No evidence of pituitary apoplexy  - Neurosurgery planning for OR 7/11  - Endocrine workup consistent with non-functioning pituitary adenoma  - Patient will require formal outpatient ophthalmology evaluation once patient is discharged    #Foreign Body Sensation OS  #White limbal lesion OS  - Patient endorsing new FBS since Saturday with irritation that has improved  - Also endorsing white spot on her cornea which has grown in size since admission and was first noted 1 month ago  - Patient denies any history of eye surgeries  - Ant exam with SPK OU as well as white nasal limbal lesion OS that extends over the cornea without invading visual axis. No epi defect noted or ulceration. Eye is otherwise white and quiet   - DDx for FBS includes dry eye vs foreign body vs pterygium. Less likely foreign body as no evidence of eye irritation or corneal abrasion or ulcer noted. May also be dryness related to white limbal lesion with inadequate tear film distribution.   - Etiology of white limbal lesion unclear at this time. May be suggestive of pterygium vs JOSE vs papilloma vs Dry eye. Patient likely Low concern for corneal ulcer at this time.  - Recommend preservative free artificial tears 4-6x per day in both eyes   - Recommend Lacrilube at bedtime in both eyes  - Will need re-evaluation in 1 week for any changes in size of lesion    Outpatient follow-up: Patient should follow-up with his/her ophthalmologist or with [facility] Department of Ophthalmology upon discharge at the address below     [facility] Department of Ophthalmology  [address] [place] [address]. [address]  [place] [address]  [phone]    Discussed with Dr. Spangler PGY-4 Chief resident    Raz Rodriguez MD PGY-2 Ophthalmology Resident

## 2024-07-09 DIAGNOSIS — D35.2 BENIGN NEOPLASM OF PITUITARY GLAND: ICD-10-CM

## 2024-07-09 DIAGNOSIS — D49.7 NEOPLASM OF UNSPECIFIED BEHAVIOR OF ENDOCRINE GLANDS AND OTHER PARTS OF NERVOUS SYSTEM: ICD-10-CM

## 2024-07-09 PROBLEM — Z00.00 ENCOUNTER FOR PREVENTIVE HEALTH EXAMINATION: Status: ACTIVE | Noted: 2024-07-09

## 2024-07-09 PROCEDURE — 99232 SBSQ HOSP IP/OBS MODERATE 35: CPT

## 2024-07-09 RX ORDER — POLYVINYL ALCOHOL, POVIDONE .5; .6 G/100ML; G/100ML
1 SOLUTION OPHTHALMIC AT BEDTIME
Refills: 0 | Status: DISCONTINUED | OUTPATIENT
Start: 2024-07-09 | End: 2024-07-11

## 2024-07-09 RX ADMIN — DEXAMETHASONE 4 MILLIGRAM(S): 1 TABLET ORAL at 00:08

## 2024-07-09 RX ADMIN — POLYVINYL ALCOHOL, POVIDONE 1 DROP(S): .5; .6 SOLUTION OPHTHALMIC at 14:11

## 2024-07-09 RX ADMIN — Medication 5 MILLIGRAM(S): at 21:45

## 2024-07-09 RX ADMIN — ENOXAPARIN SODIUM 40 MILLIGRAM(S): 100 INJECTION SUBCUTANEOUS at 18:06

## 2024-07-09 RX ADMIN — POLYVINYL ALCOHOL, POVIDONE 1 DROP(S): .5; .6 SOLUTION OPHTHALMIC at 21:45

## 2024-07-09 RX ADMIN — DEXAMETHASONE 4 MILLIGRAM(S): 1 TABLET ORAL at 12:23

## 2024-07-09 RX ADMIN — Medication 2 TABLET(S): at 21:44

## 2024-07-09 RX ADMIN — POLYVINYL ALCOHOL, POVIDONE 1 DROP(S): .5; .6 SOLUTION OPHTHALMIC at 05:08

## 2024-07-09 RX ADMIN — ATORVASTATIN CALCIUM 20 MILLIGRAM(S): 20 TABLET, FILM COATED ORAL at 21:45

## 2024-07-09 RX ADMIN — DEXAMETHASONE 4 MILLIGRAM(S): 1 TABLET ORAL at 05:08

## 2024-07-09 RX ADMIN — MUPIROCIN 1 APPLICATION(S): 20 OINTMENT TOPICAL at 05:08

## 2024-07-09 RX ADMIN — POLYVINYL ALCOHOL, POVIDONE 1 APPLICATION(S): .5; .6 SOLUTION OPHTHALMIC at 21:45

## 2024-07-09 RX ADMIN — DEXAMETHASONE 4 MILLIGRAM(S): 1 TABLET ORAL at 18:06

## 2024-07-09 NOTE — PROGRESS NOTE ADULT - ATTENDING COMMENTS
Agree with Dr. Brar's assessment and plan as outlined above. Reviewed all pertinent labs, and imaging studies. Modifications made as indicated above. Following this 44 F with history of thyroid goiter, HLD for sellar mass of 1.9 cm. Preop pituitary workup shows diluted prolactin of 108. TSH 6.40, no FT4. 2 am cortisol 4 while on dexametahsone. Consistent with nonfunctioning pituitary adenoma. Pending TSP on 7/11. Rest of the plan as above.

## 2024-07-09 NOTE — PROGRESS NOTE ADULT - SUBJECTIVE AND OBJECTIVE BOX
SUBJECTIVE: HPI:  44F [place] speaking ([facility], later [facility] xfer) p/f progressive HAs/L eye blurry vision/short term mem loss x1mo, w/ acutely worse Lt side HA x2d. At 11AM yesterday suddenly could only see figures/shadows from Lt eye. Optho noted Lt optic nn. dysfxn/no papilledema. 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 Exam: Wide awake, Ox3, pupils dilated by optho, VFF to finger counting b/l but Lt eye can only see shadows/figure outlines, no drift o/w intact  (04 Jul 2024 02:28)      OVERNIGHT EVENTS: No acute events overnight, patient seen and evaluated with her daughter at bedside who she preferred to translate for her.     Vital Signs Last 24 Hrs  T(C): 36.7 (09 Jul 2024 13:59), Max: 37.1 (09 Jul 2024 05:05)  T(F): 98.1 (09 Jul 2024 13:59), Max: 98.7 (09 Jul 2024 05:05)  HR: 71 (09 Jul 2024 13:59) (52 - 81)  BP: 111/65 (09 Jul 2024 13:59) (107/70 - 113/69)  BP(mean): --  RR: 18 (09 Jul 2024 13:59) (18 - 18)  SpO2: 96% (09 Jul 2024 13:59) (93% - 96%)    Parameters below as of 09 Jul 2024 13:59  Patient On (Oxygen Delivery Method): room air        PHYSICAL EXAM:    General: No Acute Distress     Neurological: Awake, OX3 (name, place, date), pupils 3mm react b/l, EOMI, left upper quadrantopia, following commands, no drift, uppers 5/5, lowers 5/5, SILT    Pulmonary: Clear to Auscultation, No Rales, No Rhonchi, No Wheezes     Cardiovascular: S1, S2, Regular Rate and Rhythm     Gastrointestinal: Soft, Nontender, Nondistended     Incision: None    LABS:  Pre-op labs to be sent in am           07-08 @ 07:01  -  07-09 @ 07:00  --------------------------------------------------------  IN: 600 mL / OUT: 0 mL / NET: 600 mL    07-09 @ 07:01  -  07-09 @ 16:23  --------------------------------------------------------  IN: 680 mL / OUT: 0 mL / NET: 680 mL      DRAINS: None    MEDICATIONS:  Antibiotics:    Neuro:  acetaminophen     Tablet .. 650 milliGRAM(s) Oral every 6 hours PRN Temp greater or equal to 38.5C (101.3F), Mild Pain (1 - 3)  ondansetron Injectable 4 milliGRAM(s) IV Push every 6 hours PRN Nausea and/or Vomiting  oxyCODONE    IR 5 milliGRAM(s) Oral every 4 hours PRN Moderate Pain (4 - 6)  oxyCODONE    IR 10 milliGRAM(s) Oral every 4 hours PRN Severe Pain (7 - 10)    Cardiac:    Pulm:    GI/:  pantoprazole    Tablet 40 milliGRAM(s) Oral before breakfast  polyethylene glycol 3350 17 Gram(s) Oral every 12 hours  senna 2 Tablet(s) Oral at bedtime    Other:   artificial tears (preservative free) Ophthalmic Solution 1 Drop(s) Left EYE three times a day  atorvastatin 20 milliGRAM(s) Oral at bedtime  dexAMETHasone  Injectable 4 milliGRAM(s) IV Push every 6 hours  enoxaparin Injectable 40 milliGRAM(s) SubCutaneous <User Schedule>  petrolatum Ophthalmic Ointment 1 Application(s) Both EYES at bedtime    DIET: [x] Regular [] CCD [] Renal [] Puree [] Dysphagia [] Tube Feeds:     IMAGING:   < from: MR Brain Stereotactic w/wo IV Cont (07.04.24 @ 03:52) >  IMPRESSION: Large homogeneously thin enhancing sellar mass extending into   the suprasellar cistern with optic chiasm compression as well as   extending into the inferior left frontal lobe extra-axially compressing   the left prechiasmal optic nerve consistent with a pituitary   macroadenoma. No evidence of pituitary apoplexy.    --- End of Report ---    JOSE A COLEMAN MD; Attending Radiologist  This document has been electronically signed. Jul 4 2024  1:06PM    < end of copied text >    < from: VA Duplex Lower Ext Vein Scan, Bilat (07.04.24 @ 14:18) >  IMPRESSION:  No evidence of deep venous thrombosis in either lower extremity.    --- End of Report ---     BROCK PERRY MD; Attending Radiologist  This document has been electronically signed. Jul 4 2024  2:34PM    < end of copied text >

## 2024-07-09 NOTE — PROGRESS NOTE ADULT - ASSESSMENT
ASSESSMENT AND PLAN: 44F [place] Island[place] speaking ([facility] xfer) p/f progressive HAs/L eye blurry vision/short term mem loss x1mo, w/ acutely worse Lt side HA x2d. At 11AM yesterday suddenly could only see figures/shadows from Lt eye. Optho noted Lt optic nn. dysfxn/no papilledema. 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     NEURO:   - Continue neuro checks q 4  - Pre-op for transphenoidal for pituitary tumor resection with Dr. Goncalves (Medically optimized on medicine note 7/4)  - Continue Decadron 4mg every 6 hours for cerebral edema  - 7/4 MR Brain and Orbits: large homogenously thin enhancing sellar mass extending into the suprasellar cistern w/ optic chiasm compression as well as extending into the inferior left frontal lobe extra-axially compression the left prechiasmal optic nerve consistent w/ pituitary macroadenoma. No evidence of pituitary apoplexy  - ENT following   - Ophthalmology following, appreciate their recs. Recalled yesterday evening as patient was complaining of foreign body sensation over her right eye - per their evaluation: DDx for FBS includes dry eye vs foreign body vs pterygium. Less likely foreign body as no evidence of eye irritation or corneal abrasion or ulcer noted. May also be dryness related to white limbal lesion with inadequate tear film distribution. Etiology of white limbal lesion unclear at this time. May be suggestive of pterygium vs JOSE vs papilloma vs Dry eye. Patient likely Low concern for corneal ulcer at this time. Was placed on Lacri-lube and continue Artificial tears. Will need re-evaluation in 1 week if any changes in size of lesion.  - Pain control w/ Tylenol prn and Oxycodone prn  - PT: no skilled needs, OT: outpatient for vision; patient will need re-eval post-op    PULM:   - On room air, O2Sat>93%  - Incentive spirometry    CV:  - -160, within goal  - Continue Lipitor for HLD    ENDO:   - Appreciate Endocrine following - suspect that this is likely non-functioning pituitary adenoma given most pituitary hormone levels without significant abnormalities. Patient also has a thyroid goiter for which she follows up with Endocrinologist Dr. Espino ([facility]) since 2021. Only been monitoring with US. Follow up outpatient with Dr. Espino    HEME/ONC:             7/6 CBC mild leukocytosis likely from steroids, afebrile. Will send pre-op CBC / T&S / Coags in am         DVT ppx: SQL, SCDs, 7/4 Le Dopp negative    RENAL:   - Afebrile  - 7/6 BMP stable, will send pre-op BMP in am  - Voiding    ID:   - Afebrile  - On Bactroban for +MSSA naropal 7/4    GI:    - Continue oral diet  - Senna and Miralax for bowel regimen  - Continue Protonix for GI ppx while on steroids    Appreciate Internal Medicine following for co-medical management.     More than 30 minutes were spent educating the patient and family regarding condition, medications, follow up plans, signs and symptoms to be concerned with, preparing paperwork, and questions answered regarding current disposition.     DISCHARGE PLANNING:   Dispo pending OR    Plan to be discussed w/ Dr. Goncalves  [id number]    ASSESSMENT AND PLAN: 44F [place]onegasque speaking ([facility] xfer) p/f progressive HAs/L eye blurry vision/short term mem loss x1mo, w/ acutely worse Lt side HA x2d. At 11AM yesterday suddenly could only see figures/shadows from Lt eye. Optho noted Lt optic nn. dysfxn/no papilledema. 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     NEURO:   - Continue neuro checks q 4  - Pre-op for transphenoidal for pituitary tumor resection with Dr. Goncalves (Medically optimized on medicine note 7/4)  - Continue Decadron 4mg every 6 hours for cerebral edema  - 7/4 MR Brain and Orbits: large homogenously thin enhancing sellar mass extending into the suprasellar cistern w/ optic chiasm compression as well as extending into the inferior left frontal lobe extra-axially compression the left prechiasmal optic nerve consistent w/ pituitary macroadenoma. No evidence of pituitary apoplexy  - ENT following   - Ophthalmology following, appreciate their recs. Recalled yesterday evening as patient was complaining of foreign body sensation over her right eye - per their evaluation: DDx for FBS includes dry eye vs foreign body vs pterygium. Less likely foreign body as no evidence of eye irritation or corneal abrasion or ulcer noted. May also be dryness related to white limbal lesion with inadequate tear film distribution. Etiology of white limbal lesion unclear at this time. May be suggestive of pterygium vs JOSE vs papilloma vs Dry eye. Patient likely Low concern for corneal ulcer at this time. Was placed on Lacri-lube and continue Artificial tears. Will need re-evaluation in 1 week if any changes in size of lesion.  - Pain control w/ Tylenol prn and Oxycodone prn  - PT: no skilled needs, OT: outpatient for vision; patient will need re-eval post-op    PULM:   - On room air, O2Sat>93%  - Incentive spirometry    CV:  - -160, within goal  - Continue Lipitor for HLD    ENDO:   - Appreciate Endocrine following - suspect that this is likely non-functioning pituitary adenoma given most pituitary hormone levels without significant abnormalities. Patient also has a thyroid goiter for which she follows up with Endocrinologist Dr. Espino ([facility]) since 2021. Only been monitoring with US. Follow up outpatient with Dr. Espino    HEME/ONC:             7/6 CBC mild leukocytosis likely from steroids, afebrile. Will send pre-op CBC / T&S / Coags in am         DVT ppx: SQL, SCDs, 7/4 Le Dopp negative    RENAL:   - Afebrile  - 7/6 BMP stable, will send pre-op BMP in am  - Voiding    ID:   - Afebrile  - On Bactroban for +MSSA yazmin 7/4    GI:    - Continue oral diet  - Senna and Miralax for bowel regimen, last BM 7/7  - Continue Protonix for GI ppx while on steroids    Appreciate Internal Medicine following for co-medical management.     More than 30 minutes were spent educating the patient and family regarding condition, medications, follow up plans, signs and symptoms to be concerned with, preparing paperwork, and questions answered regarding current disposition.     DISCHARGE PLANNING:   Dispo pending OR    Plan to be discussed w/ Dr. Goncalves  [phone]

## 2024-07-09 NOTE — PROGRESS NOTE ADULT - SUBJECTIVE AND OBJECTIVE BOX
Agustina Brar MD, PGY-4  Endocrinology fellow  Available on [employer] Teams    Interval Events: No acute overnight events. Pt seen and examined. Reports fatigue but improving vision (can now see colors), as well as hot flashes. Tolerating PO, no nausea/vomiting. Denies dysphagia, dysphonia, CP, SOB, abdominal pain, constipation, diarrhea.    Patient preferred to use daughter as ad hoc .      MEDICATIONS  (STANDING):  artificial tears (preservative free) Ophthalmic Solution 1 Drop(s) Left EYE three times a day  atorvastatin 20 milliGRAM(s) Oral at bedtime  dexAMETHasone  Injectable 4 milliGRAM(s) IV Push every 6 hours  enoxaparin Injectable 40 milliGRAM(s) SubCutaneous <User Schedule>  pantoprazole    Tablet 40 milliGRAM(s) Oral before breakfast  petrolatum Ophthalmic Ointment 1 Application(s) Both EYES at bedtime  polyethylene glycol 3350 17 Gram(s) Oral every 12 hours  senna 2 Tablet(s) Oral at bedtime    MEDICATIONS  (PRN):  acetaminophen     Tablet .. 650 milliGRAM(s) Oral every 6 hours PRN Temp greater or equal to 38.5C (101.3F), Mild Pain (1 - 3)  ondansetron Injectable 4 milliGRAM(s) IV Push every 6 hours PRN Nausea and/or Vomiting  oxyCODONE    IR 5 milliGRAM(s) Oral every 4 hours PRN Moderate Pain (4 - 6)  oxyCODONE    IR 10 milliGRAM(s) Oral every 4 hours PRN Severe Pain (7 - 10)      Allergies    No Known Allergies    Intolerances          ================PHYSICAL EXAM======================  VITALS: T(C): 36.7 (07-09-24 @ 13:59)  T(F): 98.1 (07-09-24 @ 13:59), Max: 98.7 (07-09-24 @ 05:05)  HR: 71 (07-09-24 @ 13:59) (52 - 81)  BP: 111/65 (07-09-24 @ 13:59) (107/70 - 113/69)  RR:  (18 - 18)  SpO2:  (93% - 96%)  Wt(kg): --  GENERAL: NAD, appears comfortable  EYES: No proptosis, no lid lag, anicteric  HEENT:  Atraumatic, Normocephalic, moist mucous membranes  RESPIRATORY: nonlabored respirations, no wheezing  PSYCH: Alert and awake  ===================================================    CAPILLARY BLOOD GLUCOSE                      Thyroid Function Tests:  07-05 @ 06:49 TSH -- FreeT4 1.0 T3 -- Anti TPO <10.0 Anti Thyroglobulin Ab -- TSI --  07-04 @ 06:44 TSH -- FreeT4 1.0 T3 -- Anti TPO -- Anti Thyroglobulin Ab -- TSI --

## 2024-07-09 NOTE — PROGRESS NOTE ADULT - ASSESSMENT
44F hx of thyroid goiter, HLD presenting for progressive HAs/L eye vision deficit. 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 Endocrine consulted for: Sellar mass, possibly pituitary adenoma    #Sellar mass, possibly pituitary adenoma  1.9cm sellar mass with L optic nerve compression with visual deficits. +headachea, L visual deficits, palpitations, cold sweats. Otherwise no significant sx: No fatigue, temperature intolerance, n/v/d, constipation, lightheadedness or dizziness, galactorrhea, breast fullness. Has hx of hysterectomy.    Preop pituitary workup:  - Prolactin 153, diluted 108.  - TSH 6.40 mildly elevated, Total T4 6.8  - 2am cortisol was 4.0  - Estradiol 36, LH 7.4, FSH 9.4  - FT4 1.0  - TPO antibodies <10  - IGF1 155    Her recent TFT's from 6/24/24 showed TSH of 3.43, FT4 of 0.9 outpatient.    Suspect that this is likely non-functioning pituitary adenoma given most pituitary hormone levels without significant abnormalities.  TSH mildly elevated, indicating pituitary function,  very recent normal TSH less than 1 week ago.  Prolactin mildly elevated likely stalk-effect from mass- size does not correlate with level   Patient was started on dexamethasone 4mg IV q6h since 5am 7/4; not able to assess HPA axis.    Recommendations  - Patient on steroids. Unable to assess HPA axis while on steroids  - Patient is pending possible TSSR 7/11  - repeat pituitary labs when patient is post-op: FT4, TSH, IGF-1, LH, FSH, prolactin    #Thyroid goiter  She has thyroid goiter for which she follows up with Endocrinologist Dr. Espino ([facility]) since 2021. Only been monitoring with US.   - Follow up outpatient with Dr. Espino    #HLD  - , from 96 in June  - c/w atorva 20 for now      D/w Dr. Farris

## 2024-07-10 LAB
ALBUMIN SERPL ELPH-MCNC: 3.8 G/DL — SIGNIFICANT CHANGE UP (ref 3.3–5)
ALP SERPL-CCNC: 80 U/L — SIGNIFICANT CHANGE UP (ref 40–120)
ALT FLD-CCNC: 41 U/L — SIGNIFICANT CHANGE UP (ref 10–45)
ANION GAP SERPL CALC-SCNC: 14 MMOL/L — SIGNIFICANT CHANGE UP (ref 5–17)
APTT BLD: 24.1 SEC — LOW (ref 24.5–35.6)
AST SERPL-CCNC: 17 U/L — SIGNIFICANT CHANGE UP (ref 10–40)
BILIRUB SERPL-MCNC: 0.6 MG/DL — SIGNIFICANT CHANGE UP (ref 0.2–1.2)
BLD GP AB SCN SERPL QL: NEGATIVE — SIGNIFICANT CHANGE UP
BUN SERPL-MCNC: 20 MG/DL — SIGNIFICANT CHANGE UP (ref 7–23)
CALCIUM SERPL-MCNC: 9.3 MG/DL — SIGNIFICANT CHANGE UP (ref 8.4–10.5)
CHLORIDE SERPL-SCNC: 103 MMOL/L — SIGNIFICANT CHANGE UP (ref 96–108)
CO2 SERPL-SCNC: 20 MMOL/L — LOW (ref 22–31)
CREAT SERPL-MCNC: 0.66 MG/DL — SIGNIFICANT CHANGE UP (ref 0.5–1.3)
EGFR: 109 ML/MIN/1.73M2 — SIGNIFICANT CHANGE UP
GLUCOSE SERPL-MCNC: 143 MG/DL — HIGH (ref 70–99)
HCG SERPL-ACNC: <2 MIU/ML — SIGNIFICANT CHANGE UP
HCT VFR BLD CALC: 41.4 % — SIGNIFICANT CHANGE UP (ref 34.5–45)
HGB BLD-MCNC: 14.2 G/DL — SIGNIFICANT CHANGE UP (ref 11.5–15.5)
INR BLD: 0.91 RATIO — SIGNIFICANT CHANGE UP (ref 0.85–1.18)
MCHC RBC-ENTMCNC: 29.8 PG — SIGNIFICANT CHANGE UP (ref 27–34)
MCHC RBC-ENTMCNC: 34.3 GM/DL — SIGNIFICANT CHANGE UP (ref 32–36)
MCV RBC AUTO: 87 FL — SIGNIFICANT CHANGE UP (ref 80–100)
NRBC # BLD: 0 /100 WBCS — SIGNIFICANT CHANGE UP (ref 0–0)
PLATELET # BLD AUTO: 279 K/UL — SIGNIFICANT CHANGE UP (ref 150–400)
POTASSIUM SERPL-MCNC: 4.3 MMOL/L — SIGNIFICANT CHANGE UP (ref 3.5–5.3)
POTASSIUM SERPL-SCNC: 4.3 MMOL/L — SIGNIFICANT CHANGE UP (ref 3.5–5.3)
PROT SERPL-MCNC: 6.5 G/DL — SIGNIFICANT CHANGE UP (ref 6–8.3)
PROTHROM AB SERPL-ACNC: 10 SEC — SIGNIFICANT CHANGE UP (ref 9.5–13)
RBC # BLD: 4.76 M/UL — SIGNIFICANT CHANGE UP (ref 3.8–5.2)
RBC # FLD: 13.4 % — SIGNIFICANT CHANGE UP (ref 10.3–14.5)
RH IG SCN BLD-IMP: POSITIVE — SIGNIFICANT CHANGE UP
SODIUM SERPL-SCNC: 137 MMOL/L — SIGNIFICANT CHANGE UP (ref 135–145)
WBC # BLD: 12.25 K/UL — HIGH (ref 3.8–10.5)
WBC # FLD AUTO: 12.25 K/UL — HIGH (ref 3.8–10.5)

## 2024-07-10 PROCEDURE — 99232 SBSQ HOSP IP/OBS MODERATE 35: CPT

## 2024-07-10 RX ORDER — BENZOYL PEROXIDE 10 %
1 GEL (GRAM) TOPICAL
Refills: 0 | Status: DISCONTINUED | OUTPATIENT
Start: 2024-07-10 | End: 2024-07-10

## 2024-07-10 RX ORDER — SODIUM CHLORIDE 0.9 % (FLUSH) 0.9 %
1000 SYRINGE (ML) INJECTION
Refills: 0 | Status: DISCONTINUED | OUTPATIENT
Start: 2024-07-10 | End: 2024-07-11

## 2024-07-10 RX ORDER — POLYVINYL ALCOHOL, POVIDONE .5; .6 G/100ML; G/100ML
1 SOLUTION OPHTHALMIC
Refills: 0 | Status: DISCONTINUED | OUTPATIENT
Start: 2024-07-10 | End: 2024-07-11

## 2024-07-10 RX ADMIN — DEXAMETHASONE 4 MILLIGRAM(S): 1 TABLET ORAL at 00:26

## 2024-07-10 RX ADMIN — POLYVINYL ALCOHOL, POVIDONE 1 DROP(S): .5; .6 SOLUTION OPHTHALMIC at 22:48

## 2024-07-10 RX ADMIN — POLYVINYL ALCOHOL, POVIDONE 1 DROP(S): .5; .6 SOLUTION OPHTHALMIC at 05:11

## 2024-07-10 RX ADMIN — DEXAMETHASONE 4 MILLIGRAM(S): 1 TABLET ORAL at 23:58

## 2024-07-10 RX ADMIN — Medication 650 MILLIGRAM(S): at 00:57

## 2024-07-10 RX ADMIN — DEXAMETHASONE 4 MILLIGRAM(S): 1 TABLET ORAL at 17:12

## 2024-07-10 RX ADMIN — ATORVASTATIN CALCIUM 20 MILLIGRAM(S): 20 TABLET, FILM COATED ORAL at 22:49

## 2024-07-10 RX ADMIN — DEXAMETHASONE 4 MILLIGRAM(S): 1 TABLET ORAL at 05:11

## 2024-07-10 RX ADMIN — DEXAMETHASONE 4 MILLIGRAM(S): 1 TABLET ORAL at 11:58

## 2024-07-10 RX ADMIN — Medication 650 MILLIGRAM(S): at 00:27

## 2024-07-10 RX ADMIN — Medication 5 MILLIGRAM(S): at 22:48

## 2024-07-10 NOTE — PROGRESS NOTE ADULT - SUBJECTIVE AND OBJECTIVE BOX
SUBJECTIVE: HPI:  44F Maltese speaking ([facility] xfer) p/f progressive HAs/L eye blurry vision/short term mem loss x1mo, w/ acutely worse Lt side HA x2d. At 11AM yesterday suddenly could only see figures/shadows from Lt eye. Optho noted Lt optic nn. dysfxn/no papilledema. 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 Exam: Wide awake, Ox3, pupils dilated by optho, VFF to finger counting b/l but Lt eye can only see shadows/figure outlines, no drift o/w intact  (04 Jul 2024 02:28)      OVERNIGHT EVENTS: Patient seen and evaluated at bedside, with her daughter translating for her. No acute events, awaiting surgery and aware that she will be NPO after midnight for her surgery tomorrow.     Vital Signs Last 24 Hrs  T(C): 36.6 (10 Jul 2024 12:44), Max: 36.7 (09 Jul 2024 15:48)  T(F): 97.9 (10 Jul 2024 12:44), Max: 98.1 (09 Jul 2024 15:48)  HR: 64 (10 Jul 2024 12:44) (60 - 70)  BP: 114/68 (10 Jul 2024 12:44) (100/64 - 116/78)  BP(mean): --  RR: 18 (10 Jul 2024 12:44) (18 - 18)  SpO2: 97% (10 Jul 2024 12:44) (93% - 98%)    Parameters below as of 10 Jul 2024 12:44  Patient On (Oxygen Delivery Method): room air        PHYSICAL EXAM:    General: No Acute Distress     Neurological: Awake, OX3 (name, place, date), pupils 3mm react b/l, EOMI, left upper quadrantopia, following commands, no drift, uppers 5/5, lowers 5/5, SILT    Pulmonary: Clear to Auscultation, No Rales, No Rhonchi, No Wheezes     Cardiovascular: S1, S2, Regular Rate and Rhythm     Gastrointestinal: Soft, Nontender, Nondistended     Incision: None    LABS:                        14.2   12.25 )-----------( 279      ( 10 Jul 2024 06:07 )             41.4    07-10    137  |  103  |  20  ----------------------------<  143<H>  4.3   |  20<L>  |  0.66    Ca    9.3      10 Jul 2024 06:09    TPro  6.5  /  Alb  3.8  /  TBili  0.6  /  DBili  x   /  AST  17  /  ALT  41  /  AlkPhos  80  07-10  PT/INR - ( 10 Jul 2024 06:07 )   PT: 10.0 sec;   INR: 0.91 ratio         PTT - ( 10 Jul 2024 06:07 )  PTT:24.1 sec      07-09 @ 07:01  -  07-10 @ 07:00  --------------------------------------------------------  IN: 680 mL / OUT: 0 mL / NET: 680 mL      DRAINS: None    MEDICATIONS:  Antibiotics:    Neuro:  acetaminophen     Tablet .. 650 milliGRAM(s) Oral every 6 hours PRN Temp greater or equal to 38.5C (101.3F), Mild Pain (1 - 3)  melatonin 5 milliGRAM(s) Oral at bedtime  ondansetron Injectable 4 milliGRAM(s) IV Push every 6 hours PRN Nausea and/or Vomiting  oxyCODONE    IR 5 milliGRAM(s) Oral every 4 hours PRN Moderate Pain (4 - 6)  oxyCODONE    IR 10 milliGRAM(s) Oral every 4 hours PRN Severe Pain (7 - 10)    Cardiac:    Pulm:    GI/:  pantoprazole    Tablet 40 milliGRAM(s) Oral before breakfast  polyethylene glycol 3350 17 Gram(s) Oral every 12 hours  senna 2 Tablet(s) Oral at bedtime    Other:   atorvastatin 20 milliGRAM(s) Oral at bedtime  benzoyl peroxide 5% Gel 1 Application(s) Topical two times a day  chlorhexidine 4% Liquid 1 Application(s) Topical at bedtime  dexAMETHasone  Injectable 4 milliGRAM(s) IV Push every 6 hours  petrolatum Ophthalmic Ointment 1 Application(s) Both EYES at bedtime  sodium chloride 0.9%. 1000 milliLiter(s) IV Continuous <Continuous>    DIET: [x NPO after midnight] Regular [] CCD [] Renal [] Puree [] Dysphagia [] Tube Feeds:     IMAGING:   < from: MR Brain Stereotactic w/wo IV Cont (07.04.24 @ 03:52) >  IMPRESSION: Large homogeneously thin enhancing sellar mass extending into   the suprasellar cistern with optic chiasm compression as well as   extending into the inferior left frontal lobe extra-axially compressing   the left prechiasmal optic nerve consistent with a pituitary   macroadenoma. No evidence of pituitary apoplexy.    --- End of Report ---    JOSE A COLEMAN MD; Attending Radiologist  This document has been electronically signed. Jul 4 2024  1:06PM    < end of copied text >    < from: [facility] Duplex Lower Ext Vein Scan, Bilat (07.04.24 @ 14:18) >  IMPRESSION:  No evidence of deep venous thrombosis in either lower extremity.    --- End of Report ---     BROCK PERRY MD; Attending Radiologist  This document has been electronically signed. Jul 4 2024  2:34PM    < end of copied text >

## 2024-07-10 NOTE — PRE PROCEDURE NOTE - PRE PROCEDURE EVALUATION
Patient is optimized for OR with Dr. Vásquez. Procedure listed above.     AOx3, left eye left upper quadrant opsia

## 2024-07-10 NOTE — PROGRESS NOTE ADULT - ASSESSMENT
ASSESSMENT AND PLAN: 44F Montenegrin speaking ([facility] xfer) p/f progressive HAs/L eye blurry vision/short term mem loss x1mo, w/ acutely worse Lt side HA x2d. At 11AM yesterday suddenly could only see figures/shadows from Lt eye. Optho noted Lt optic nn. dysfxn/no papilledema. 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     NEURO:   - Continue neuro checks q 4  - Pre-op for transphenoidal for pituitary tumor resection with Dr. Goncalves in am (Thursday, 7/11) (Medically optimized on medicine note 7/4)  - Continue Decadron 4mg every 6 hours for cerebral edema  - 7/4 MR Brain and Orbits: large homogenously thin enhancing sellar mass extending into the suprasellar cistern w/ optic chiasm compression as well as extending into the inferior left frontal lobe extra-axially compression the left prechiasmal optic nerve consistent w/ pituitary macroadenoma. No evidence of pituitary apoplexy  - ENT following   - Ophthalmology following, appreciate their recs. Recalled 7/8 evening as patient was complaining of foreign body sensation over her right eye - per their evaluation: DDx for FBS includes dry eye vs foreign body vs pterygium. Less likely foreign body as no evidence of eye irritation or corneal abrasion or ulcer noted. May also be dryness related to white limbal lesion with inadequate tear film distribution. Etiology of white limbal lesion unclear at this time. May be suggestive of pterygium vs JOSE vs papilloma vs Dry eye. Patient likely Low concern for corneal ulcer at this time. Was placed on Lacri-lube and continue Artificial tears. Will need re-evaluation in 1 week if any changes in size of lesion.  - Pain control w/ Tylenol prn and Oxycodone prn  - PT: no skilled needs, OT: outpatient for vision; patient will need re-eval post-op    PULM:   - On room air, O2Sat>93%  - Incentive spirometry    CV:  - -160, within goal  - Continue Lipitor for HLD    ENDO:   - Appreciate Endocrine following - suspect that this is likely non-functioning pituitary adenoma given most pituitary hormone levels without significant abnormalities. Patient also has a thyroid goiter for which she follows up with Endocrinologist Dr. Espino ([facility]) since 2021. Only been monitoring with US. Follow up outpatient with Dr. Espino. Endocrine to follow after surgery.     HEME/ONC:             7/10 CBC mild leukocytosis likely from steroids, afebrile. Coags WNL. T&S sent         DVT ppx: SQL - discontinued for OR in am, SCDs, 7/4 Le Dopp negative    RENAL:   - Afebrile  - 7/10 BMP stable  - Voiding    ID:   - Afebrile  - On Bactroban for +MSSA nares 7/4, finished 7/9    GI:    - Continue oral diet  - Senna and Miralax for bowel regimen, last BM 7/8  - Continue Protonix for GI ppx while on steroids  - 7/10 LFTs WNL    Appreciate Internal Medicine following for co-medical management and medical clearance.     More than 30 minutes were spent educating the patient and family regarding condition, medications, follow up plans, signs and symptoms to be concerned with, preparing paperwork, and questions answered regarding current disposition.     DISCHARGE PLANNING:   Dispo pending OR in am    Plan to be discussed w/ Dr. Goncalves  [id number]

## 2024-07-11 ENCOUNTER — RESULT REVIEW (OUTPATIENT)
Age: 44
End: 2024-07-11

## 2024-07-11 ENCOUNTER — APPOINTMENT (OUTPATIENT)
Dept: NEUROSURGERY | Facility: HOSPITAL | Age: 44
End: 2024-07-11

## 2024-07-11 LAB
ANION GAP SERPL CALC-SCNC: 14 MMOL/L — SIGNIFICANT CHANGE UP (ref 5–17)
BUN SERPL-MCNC: 23 MG/DL — SIGNIFICANT CHANGE UP (ref 7–23)
CALCIUM SERPL-MCNC: 8.4 MG/DL — SIGNIFICANT CHANGE UP (ref 8.4–10.5)
CHLORIDE SERPL-SCNC: 102 MMOL/L — SIGNIFICANT CHANGE UP (ref 96–108)
CO2 SERPL-SCNC: 20 MMOL/L — LOW (ref 22–31)
CREAT SERPL-MCNC: 0.64 MG/DL — SIGNIFICANT CHANGE UP (ref 0.5–1.3)
EGFR: 110 ML/MIN/1.73M2 — SIGNIFICANT CHANGE UP
GAS PNL BLDA: SIGNIFICANT CHANGE UP
GLUCOSE BLDC GLUCOMTR-MCNC: 105 MG/DL — HIGH (ref 70–99)
GLUCOSE BLDC GLUCOMTR-MCNC: 135 MG/DL — HIGH (ref 70–99)
GLUCOSE SERPL-MCNC: 150 MG/DL — HIGH (ref 70–99)
HCT VFR BLD CALC: 39.6 % — SIGNIFICANT CHANGE UP (ref 34.5–45)
HGB BLD-MCNC: 13.6 G/DL — SIGNIFICANT CHANGE UP (ref 11.5–15.5)
MCHC RBC-ENTMCNC: 29.6 PG — SIGNIFICANT CHANGE UP (ref 27–34)
MCHC RBC-ENTMCNC: 34.3 GM/DL — SIGNIFICANT CHANGE UP (ref 32–36)
MCV RBC AUTO: 86.1 FL — SIGNIFICANT CHANGE UP (ref 80–100)
NRBC # BLD: 0 /100 WBCS — SIGNIFICANT CHANGE UP (ref 0–0)
PLATELET # BLD AUTO: 275 K/UL — SIGNIFICANT CHANGE UP (ref 150–400)
POTASSIUM SERPL-MCNC: 3.6 MMOL/L — SIGNIFICANT CHANGE UP (ref 3.5–5.3)
POTASSIUM SERPL-SCNC: 3.6 MMOL/L — SIGNIFICANT CHANGE UP (ref 3.5–5.3)
RBC # BLD: 4.6 M/UL — SIGNIFICANT CHANGE UP (ref 3.8–5.2)
RBC # FLD: 13.6 % — SIGNIFICANT CHANGE UP (ref 10.3–14.5)
SODIUM SERPL-SCNC: 136 MMOL/L — SIGNIFICANT CHANGE UP (ref 135–145)
WBC # BLD: 19.18 K/UL — HIGH (ref 3.8–10.5)
WBC # FLD AUTO: 19.18 K/UL — HIGH (ref 3.8–10.5)

## 2024-07-11 PROCEDURE — 88342 IMHCHEM/IMCYTCHM 1ST ANTB: CPT | Mod: 26,59

## 2024-07-11 PROCEDURE — 15740 ISLAND PEDICLE FLAP GRAFT: CPT

## 2024-07-11 PROCEDURE — 88305 TISSUE EXAM BY PATHOLOGIST: CPT | Mod: 26

## 2024-07-11 PROCEDURE — 88341 IMHCHEM/IMCYTCHM EA ADD ANTB: CPT | Mod: 26,59

## 2024-07-11 PROCEDURE — 61781 SCAN PROC CRANIAL INTRA: CPT

## 2024-07-11 PROCEDURE — 61782 SCAN PROC CRANIAL EXTRA: CPT

## 2024-07-11 PROCEDURE — 88360 TUMOR IMMUNOHISTOCHEM/MANUAL: CPT | Mod: 26,1L

## 2024-07-11 PROCEDURE — 62165 REMOVE PITUIT TUMOR W/SCOPE: CPT | Mod: 22,62

## 2024-07-11 PROCEDURE — 62165 REMOVE PITUIT TUMOR W/SCOPE: CPT | Mod: 62,22

## 2024-07-11 DEVICE — SURGIFOAM PAD 8CM X 12.5CM X 10MM (100): Type: IMPLANTABLE DEVICE | Status: FUNCTIONAL

## 2024-07-11 DEVICE — SURGIFLO MATRIX WITH THROMBIN KIT: Type: IMPLANTABLE DEVICE | Status: FUNCTIONAL

## 2024-07-11 DEVICE — DURAL REPAIR PATCH DURA-GUARD 4CM X 4CM: Type: IMPLANTABLE DEVICE | Status: FUNCTIONAL

## 2024-07-11 DEVICE — KIT A-LINE 1LUM 20G X 12CM SAFE KIT: Type: IMPLANTABLE DEVICE | Status: FUNCTIONAL

## 2024-07-11 DEVICE — MAYFIELD SKULL PIN ADULT PLASTIC: Type: IMPLANTABLE DEVICE | Status: FUNCTIONAL

## 2024-07-11 DEVICE — DVC ADHERUS AUTOSPRAY W/ DURAL SEALANT EXT TIP: Type: IMPLANTABLE DEVICE | Status: FUNCTIONAL

## 2024-07-11 DEVICE — GRAFT DURAL MATRX BOVINE 2.5X7.5CM: Type: IMPLANTABLE DEVICE | Status: FUNCTIONAL

## 2024-07-11 RX ORDER — SODIUM CHLORIDE 0.9 % (FLUSH) 0.9 %
1000 SYRINGE (ML) INJECTION
Refills: 0 | Status: DISCONTINUED | OUTPATIENT
Start: 2024-07-11 | End: 2024-07-12

## 2024-07-11 RX ORDER — POLYETHYLENE GLYCOL 3350 1 G/G
17 POWDER ORAL DAILY
Refills: 0 | Status: DISCONTINUED | OUTPATIENT
Start: 2024-07-11 | End: 2024-07-12

## 2024-07-11 RX ORDER — BISACODYL 5 MG
5 TABLET, DELAYED RELEASE (ENTERIC COATED) ORAL DAILY
Refills: 0 | Status: DISCONTINUED | OUTPATIENT
Start: 2024-07-11 | End: 2024-07-17

## 2024-07-11 RX ORDER — HYDROMORPHONE HCL 0.2 MG/ML
0.25 INJECTION, SOLUTION INTRAVENOUS
Refills: 0 | Status: DISCONTINUED | OUTPATIENT
Start: 2024-07-11 | End: 2024-07-11

## 2024-07-11 RX ORDER — ONDANSETRON HYDROCHLORIDE 2 MG/ML
4 INJECTION INTRAMUSCULAR; INTRAVENOUS EVERY 6 HOURS
Refills: 0 | Status: DISCONTINUED | OUTPATIENT
Start: 2024-07-11 | End: 2024-07-17

## 2024-07-11 RX ORDER — CEFAZOLIN 10 G/1
2000 INJECTION, POWDER, FOR SOLUTION INTRAVENOUS EVERY 8 HOURS
Refills: 0 | Status: COMPLETED | OUTPATIENT
Start: 2024-07-11 | End: 2024-07-12

## 2024-07-11 RX ORDER — ACETAMINOPHEN 325 MG
650 TABLET ORAL EVERY 6 HOURS
Refills: 0 | Status: DISCONTINUED | OUTPATIENT
Start: 2024-07-11 | End: 2024-07-13

## 2024-07-11 RX ORDER — ONDANSETRON HYDROCHLORIDE 2 MG/ML
4 INJECTION INTRAMUSCULAR; INTRAVENOUS ONCE
Refills: 0 | Status: DISCONTINUED | OUTPATIENT
Start: 2024-07-11 | End: 2024-07-11

## 2024-07-11 RX ORDER — OXYCODONE HYDROCHLORIDE 100 MG/5ML
5 SOLUTION ORAL EVERY 4 HOURS
Refills: 0 | Status: DISCONTINUED | OUTPATIENT
Start: 2024-07-11 | End: 2024-07-14

## 2024-07-11 RX ORDER — POTASSIUM CHLORIDE 600 MG/1
40 TABLET, FILM COATED, EXTENDED RELEASE ORAL ONCE
Refills: 0 | Status: COMPLETED | OUTPATIENT
Start: 2024-07-11 | End: 2024-07-12

## 2024-07-11 RX ORDER — ATORVASTATIN CALCIUM 20 MG/1
20 TABLET, FILM COATED ORAL AT BEDTIME
Refills: 0 | Status: DISCONTINUED | OUTPATIENT
Start: 2024-07-11 | End: 2024-07-17

## 2024-07-11 RX ORDER — FAMOTIDINE 40 MG
20 TABLET ORAL EVERY 12 HOURS
Refills: 0 | Status: DISCONTINUED | OUTPATIENT
Start: 2024-07-11 | End: 2024-07-17

## 2024-07-11 RX ORDER — DEXAMETHASONE 1 MG/1
4 TABLET ORAL EVERY 6 HOURS
Refills: 0 | Status: DISCONTINUED | OUTPATIENT
Start: 2024-07-11 | End: 2024-07-13

## 2024-07-11 RX ORDER — SENNOSIDES 8.6 MG
2 TABLET ORAL AT BEDTIME
Refills: 0 | Status: DISCONTINUED | OUTPATIENT
Start: 2024-07-11 | End: 2024-07-17

## 2024-07-11 RX ADMIN — Medication 1 APPLICATION(S): at 00:00

## 2024-07-11 RX ADMIN — Medication 75 MILLILITER(S): at 21:51

## 2024-07-11 RX ADMIN — DEXAMETHASONE 4 MILLIGRAM(S): 1 TABLET ORAL at 11:50

## 2024-07-11 RX ADMIN — DEXAMETHASONE 4 MILLIGRAM(S): 1 TABLET ORAL at 05:42

## 2024-07-11 RX ADMIN — HYDROMORPHONE HCL 0.25 MILLIGRAM(S): 0.2 INJECTION, SOLUTION INTRAVENOUS at 21:52

## 2024-07-11 NOTE — PROGRESS NOTE ADULT - ASSESSMENT
ASSESSMENT: TSP resection of pituitary adenoma, POD 0    N   #07/11/24 s/p expanded endonasal approach for non functioning pituitary adenoma with expected CSF leak due to arachnoid disection with nasoseptal flap repair  CT Head in AM  MRI wwo post-op   wean dexamethasone   Skull base precautions/monitor CSF leakage    #Pain: Tylenol and oxycodone  #Activity: [] OOB as tolerated [] Bedrest [x] PT [x] OT [] PMNR    CV   #-160mmHg  d/c a-line in AM if hemodynamically stable    P   #Pituitary precautions (no incentive spirometry, no straws, no BIPAP)    R   #Strict I+Os  IVL   Drink to thirst    GI   #Diet: Dysphagia screen and then advance diet as tolerated  #Senna miralax  Last BM PTA     ID   #Isidra-op antibiotics     ENDO:   #Goal euglycemia (-180)  #Pituitary labs: Cortisol AM x 3d  TSH, FSH, LH, Prolactin, T4, in AM POD1   #Monitor for DI     H  #DVT ppx:   SCDs   Chemoprophylaxis tomorrow  LED 7/4 no DVT    #CODE STATUS: FULL CODE    #DISPOSITION: ICU  ASSESSMENT: TSP resection of pituitary adenoma, POD 0    N   #07/11/24 s/p expanded endonasal approach for non functioning pituitary adenoma with expected CSF leak due to arachnoid disection with nasoseptal flap repair  MRI wwo post-op   wean dexamethasone   Skull base precautions/monitor CSF leakage    #Pain: Tylenol and oxycodone  #Activity: [] OOB as tolerated [] Bedrest [x] PT [x] OT [] PMNR    CV   #-160mmHg  d/c a-line in AM if hemodynamically stable    P   #Pituitary precautions (no incentive spirometry, no straws, no BIPAP)    R   #Strict I+Os  IVL   Drink to thirst    GI   #Diet: Dysphagia screen and then advance diet as tolerated  #Senna miralax  Last BM PTA     ID   #Isidra-op antibiotics     ENDO:   #Goal euglycemia (-180)  #Pituitary labs: Cortisol AM x 3d  TSH, FSH, LH, Prolactin, T4, in AM POD1   #Monitor for DI, BMP q8h Osm, Ulytes spec gravity   #HLD continue atorvastatin     H  #DVT ppx:   SCDs   Chemoprophylaxis tomorrow  LED 7/4 no DVT    #CODE STATUS: FULL CODE    #DISPOSITION: ICU

## 2024-07-11 NOTE — PRE-OP CHECKLIST - IV STARTED
yes upon arrival to [facility], right forearm iv noted to be indurated, red and painful. iv removed and new iv started in left hand-Dr Caldwell and Dr Goncalves made aware/yes

## 2024-07-11 NOTE — PROGRESS NOTE ADULT - SUBJECTIVE AND OBJECTIVE BOX
ENT ISSUE/POD: 44y POD#0 from TSRP with high flow leak, repaired with duragen inlay, duraguard button, L nasoseptal flap.     HPI: 44y POD#0 from TSRP with high flow leak, repaired with duragen inlay, duraguard button, L nasoseptal flap. Pt doing well, slight h/a, relieved with pain meds. Pt L vision remains unchanged only able to see colors. Pt denies any bleeding, no leaking, no salty or metallic taste in mouth, no PND.       PAST MEDICAL & SURGICAL HISTORY:  Hyperlipidemia      H/O: hysterectomy        Allergies    No Known Allergies    Intolerances      MEDICATIONS  (STANDING):  atorvastatin 20 milliGRAM(s) Oral at bedtime  ceFAZolin   IVPB 2000 milliGRAM(s) IV Intermittent every 8 hours  chlorhexidine 4% Liquid 1 Application(s) Topical <User Schedule>  dexAMETHasone     Tablet 4 milliGRAM(s) Oral every 6 hours  famotidine    Tablet 20 milliGRAM(s) Oral every 12 hours  polyethylene glycol 3350 17 Gram(s) Oral daily  senna 2 Tablet(s) Oral at bedtime  sodium chloride 0.9%. 1000 milliLiter(s) (75 mL/Hr) IV Continuous <Continuous>    MEDICATIONS  (PRN):  acetaminophen     Tablet .. 650 milliGRAM(s) Oral every 6 hours PRN Mild Pain (1 - 3)  bisacodyl 5 milliGRAM(s) Oral daily PRN Constipation  ondansetron Injectable 4 milliGRAM(s) IV Push every 6 hours PRN Nausea and/or Vomiting  oxyCODONE    IR 5 milliGRAM(s) Oral every 4 hours PRN Moderate Pain (4 - 6)      social history: see consult     family history: see consult     ROS:   ENT: all negative except as noted in HPI   Pulm: denies SOB, cough, hemoptysis  Neuro: denies numbness/tingling, loss of sensation  Endo: denies heat/cold intolerance, excessive sweating      Vital Signs Last 24 Hrs  T(C): 36.2 (12 Jul 2024 03:00), Max: 36.6 (11 Jul 2024 12:30)  T(F): 97.1 (12 Jul 2024 03:00), Max: 97.9 (11 Jul 2024 12:30)  HR: 69 (12 Jul 2024 04:00) (52 - 81)  BP: 120/68 (11 Jul 2024 21:45) (107/67 - 129/77)  BP(mean): 87 (11 Jul 2024 21:45) (85 - 93)  RR: 12 (12 Jul 2024 04:00) (10 - 19)  SpO2: 96% (12 Jul 2024 04:00) (95% - 98%)    Parameters below as of 11 Jul 2024 21:45  Patient On (Oxygen Delivery Method): room air                              13.6   19.18 )-----------( 275      ( 11 Jul 2024 21:17 )             39.6    07-11    136  |  102  |  23  ----------------------------<  150<H>  3.6   |  20<L>  |  0.64    Ca    8.4      11 Jul 2024 21:17    TPro  6.5  /  Alb  3.8  /  TBili  0.6  /  DBili  x   /  AST  17  /  ALT  41  /  AlkPhos  80  07-10   PT/INR - ( 10 Jul 2024 06:07 )   PT: 10.0 sec;   INR: 0.91 ratio         PTT - ( 10 Jul 2024 06:07 )  PTT:24.1 sec    PHYSICAL EXAM:  Gen: NAD  Skin: No rashes, bruises, or lesions  Head: Normocephalic, Atraumatic  Face: no edema, erythema, or fluctuance. Parotid glands soft without mass  Eyes: no scleral injection  Nose: Nares bilaterally with dissolvable packings, no discharge  Mouth: No Stridor / Drooling / Trismus.  Mucosa moist, tongue/uvula midline, oropharynx clear  Neck: Flat, supple, no lymphadenopathy, trachea midline, no masses  Lymphatic: No lymphadenopathy  Resp: breathing easily, no stridor  Neuro: facial nerve intact, no facial droop

## 2024-07-11 NOTE — PROGRESS NOTE ADULT - SUBJECTIVE AND OBJECTIVE BOX
NSICU NIGHT PROGRESS NOTE     Please see neurosurgery H&P for HPI.     12h events   admitted to nsicu s/p expanded endonasal approach for non functioning pituitary adenoma with expected CSF leak due to arachnoid disection with nasoseptal flap repair.     Exam:   HEENT: neck supple    Neurology: Alert, awake, oriented on self, place and year, speech fluent, follows simple commands, PERRL, EOMI, OS sees shadows, VFF to finger counting, gaze midline, face symmetric   UE/LE 5/5  Cardiovascular: S1, S2, Regular rate and rhythm   Pulmonary: Clear to Auscultation, No rales, No rhonchi, No wheezes   Gastrointestinal: Soft, Non-tender, Non-distended   Skin: warm and dry     VITALS:   Vital Signs Last 24 Hrs  T(C): 36.4 (11 Jul 2024 21:45), Max: 36.6 (11 Jul 2024 04:28)  T(F): 97.5 (11 Jul 2024 21:45), Max: 97.9 (11 Jul 2024 12:30)  HR: 69 (11 Jul 2024 21:45) (52 - 77)  BP: 120/68 (11 Jul 2024 21:45) (107/67 - 129/77)  BP(mean): 87 (11 Jul 2024 21:45) (85 - 93)  RR: 16 (11 Jul 2024 21:45) (15 - 18)  SpO2: 96% (11 Jul 2024 21:45) (95% - 98%)    Parameters below as of 11 Jul 2024 21:45  Patient On (Oxygen Delivery Method): room air        Drips     Respiratory:    ABG - ( 11 Jul 2024 17:20 )  pH, Arterial: 7.43  pH, Blood: x     /  pCO2: 38    /  pO2: 254   / HCO3: 25    / Base Excess: 1.0   /  SaO2: 100.0               LABS:                        13.6   19.18 )-----------( 275      ( 11 Jul 2024 21:17 )             39.6     07-11    136  |  102  |  23  ----------------------------<  150<H>  3.6   |  20<L>  |  0.64      CAPILLARY BLOOD GLUCOSE      POCT Blood Glucose.: 135 mg/dL (11 Jul 2024 21:06)  POCT Blood Glucose.: 105 mg/dL (11 Jul 2024 13:51)      I&O's Summary    11 Jul 2024 07:01  -  11 Jul 2024 22:59  --------------------------------------------------------  IN: 675 mL / OUT: 85 mL / NET: 590 mL        Imaging   CXR     EKG     MEDICATION LEVELS:     IVF FLUIDS/MEDICATIONS:   MEDICATIONS  (STANDING):  ceFAZolin   IVPB 2000 milliGRAM(s) IV Intermittent every 8 hours  famotidine    Tablet 20 milliGRAM(s) Oral every 12 hours  polyethylene glycol 3350 17 Gram(s) Oral daily  potassium chloride    Tablet ER 40 milliEquivalent(s) Oral once  senna 2 Tablet(s) Oral at bedtime  sodium chloride 0.9%. 1000 milliLiter(s) (75 mL/Hr) IV Continuous <Continuous>    MEDICATIONS  (PRN):  acetaminophen     Tablet .. 650 milliGRAM(s) Oral every 6 hours PRN Mild Pain (1 - 3)  bisacodyl 5 milliGRAM(s) Oral daily PRN Constipation  ondansetron Injectable 4 milliGRAM(s) IV Push every 6 hours PRN Nausea and/or Vomiting  oxyCODONE    IR 5 milliGRAM(s) Oral every 4 hours PRN Moderate Pain (4 - 6)       NSICU NIGHT PROGRESS NOTE     Please see neurosurgery H&P for HPI.     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     Exam:   HEENT: neck supple    Neurology: Alert, awake, oriented on self, place and year, speech fluent, follows simple commands, PERRL, EOMI, OS sees shadows, VFF to finger counting, gaze midline, face symmetric   UE/LE 5/5  Cardiovascular: S1, S2, Regular rate and rhythm   Pulmonary: Clear to Auscultation, No rales, No rhonchi, No wheezes   Gastrointestinal: Soft, Non-tender, Non-distended   Skin: warm and dry     VITALS:   Vital Signs Last 24 Hrs  T(C): 36.4 (11 Jul 2024 21:45), Max: 36.6 (11 Jul 2024 04:28)  T(F): 97.5 (11 Jul 2024 21:45), Max: 97.9 (11 Jul 2024 12:30)  HR: 69 (11 Jul 2024 21:45) (52 - 77)  BP: 120/68 (11 Jul 2024 21:45) (107/67 - 129/77)  BP(mean): 87 (11 Jul 2024 21:45) (85 - 93)  RR: 16 (11 Jul 2024 21:45) (15 - 18)  SpO2: 96% (11 Jul 2024 21:45) (95% - 98%)    Parameters below as of 11 Jul 2024 21:45  Patient On (Oxygen Delivery Method): room air        Drips     Respiratory:    ABG - ( 11 Jul 2024 17:20 )  pH, Arterial: 7.43  pH, Blood: x     /  pCO2: 38    /  pO2: 254   / HCO3: 25    / Base Excess: 1.0   /  SaO2: 100.0               LABS:                        13.6   19.18 )-----------( 275      ( 11 Jul 2024 21:17 )             39.6     07-11    136  |  102  |  23  ----------------------------<  150<H>  3.6   |  20<L>  |  0.64      CAPILLARY BLOOD GLUCOSE      POCT Blood Glucose.: 135 mg/dL (11 Jul 2024 21:06)  POCT Blood Glucose.: 105 mg/dL (11 Jul 2024 13:51)      I&O's Summary    11 Jul 2024 07:01  -  11 Jul 2024 22:59  --------------------------------------------------------  IN: 675 mL / OUT: 85 mL / NET: 590 mL        Imaging   CXR     EKG     MEDICATION LEVELS:     IVF FLUIDS/MEDICATIONS:   MEDICATIONS  (STANDING):  ceFAZolin   IVPB 2000 milliGRAM(s) IV Intermittent every 8 hours  famotidine    Tablet 20 milliGRAM(s) Oral every 12 hours  polyethylene glycol 3350 17 Gram(s) Oral daily  potassium chloride    Tablet ER 40 milliEquivalent(s) Oral once  senna 2 Tablet(s) Oral at bedtime  sodium chloride 0.9%. 1000 milliLiter(s) (75 mL/Hr) IV Continuous <Continuous>    MEDICATIONS  (PRN):  acetaminophen     Tablet .. 650 milliGRAM(s) Oral every 6 hours PRN Mild Pain (1 - 3)  bisacodyl 5 milliGRAM(s) Oral daily PRN Constipation  ondansetron Injectable 4 milliGRAM(s) IV Push every 6 hours PRN Nausea and/or Vomiting  oxyCODONE    IR 5 milliGRAM(s) Oral every 4 hours PRN Moderate Pain (4 - 6)

## 2024-07-11 NOTE — PROGRESS NOTE ADULT - ASSESSMENT
44y POD#0 from TSRP with high flow leak, repaired with duragen inlay, duraguard button, L nasoseptal flap. Pt doing well, slight h/a, relieved with pain meds. Pt L vision remains unchanged only able to see colors. Pt denies any bleeding, no leaking, no salty or metallic taste in mouth, no PND.

## 2024-07-12 LAB
ADD ON TEST-SPECIMEN IN LAB: SIGNIFICANT CHANGE UP
ANION GAP SERPL CALC-SCNC: 10 MMOL/L — SIGNIFICANT CHANGE UP (ref 5–17)
ANION GAP SERPL CALC-SCNC: 13 MMOL/L — SIGNIFICANT CHANGE UP (ref 5–17)
ANION GAP SERPL CALC-SCNC: 14 MMOL/L — SIGNIFICANT CHANGE UP (ref 5–17)
APPEARANCE UR: CLEAR — SIGNIFICANT CHANGE UP
BACTERIA # UR AUTO: NEGATIVE /HPF — SIGNIFICANT CHANGE UP
BILIRUB UR-MCNC: NEGATIVE — SIGNIFICANT CHANGE UP
BUN SERPL-MCNC: 15 MG/DL — SIGNIFICANT CHANGE UP (ref 7–23)
BUN SERPL-MCNC: 19 MG/DL — SIGNIFICANT CHANGE UP (ref 7–23)
BUN SERPL-MCNC: 20 MG/DL — SIGNIFICANT CHANGE UP (ref 7–23)
CALCIUM SERPL-MCNC: 8.4 MG/DL — SIGNIFICANT CHANGE UP (ref 8.4–10.5)
CALCIUM SERPL-MCNC: 8.6 MG/DL — SIGNIFICANT CHANGE UP (ref 8.4–10.5)
CALCIUM SERPL-MCNC: 8.9 MG/DL — SIGNIFICANT CHANGE UP (ref 8.4–10.5)
CAST: 0 /LPF — SIGNIFICANT CHANGE UP (ref 0–4)
CHLORIDE SERPL-SCNC: 101 MMOL/L — SIGNIFICANT CHANGE UP (ref 96–108)
CHLORIDE SERPL-SCNC: 103 MMOL/L — SIGNIFICANT CHANGE UP (ref 96–108)
CHLORIDE SERPL-SCNC: 111 MMOL/L — HIGH (ref 96–108)
CO2 SERPL-SCNC: 18 MMOL/L — LOW (ref 22–31)
CO2 SERPL-SCNC: 20 MMOL/L — LOW (ref 22–31)
CO2 SERPL-SCNC: 23 MMOL/L — SIGNIFICANT CHANGE UP (ref 22–31)
COLOR SPEC: YELLOW — SIGNIFICANT CHANGE UP
CORTIS AM PEAK SERPL-MCNC: 23.7 UG/DL — HIGH (ref 6–18.4)
CREAT SERPL-MCNC: 0.56 MG/DL — SIGNIFICANT CHANGE UP (ref 0.5–1.3)
CREAT SERPL-MCNC: 0.6 MG/DL — SIGNIFICANT CHANGE UP (ref 0.5–1.3)
CREAT SERPL-MCNC: 0.9 MG/DL — SIGNIFICANT CHANGE UP (ref 0.5–1.3)
DIFF PNL FLD: ABNORMAL
EGFR: 112 ML/MIN/1.73M2 — SIGNIFICANT CHANGE UP
EGFR: 114 ML/MIN/1.73M2 — SIGNIFICANT CHANGE UP
EGFR: 80 ML/MIN/1.73M2 — SIGNIFICANT CHANGE UP
GLUCOSE SERPL-MCNC: 165 MG/DL — HIGH (ref 70–99)
GLUCOSE SERPL-MCNC: 186 MG/DL — HIGH (ref 70–99)
GLUCOSE SERPL-MCNC: 212 MG/DL — HIGH (ref 70–99)
GLUCOSE UR QL: 100 MG/DL
KETONES UR-MCNC: NEGATIVE MG/DL — SIGNIFICANT CHANGE UP
LEUKOCYTE ESTERASE UR-ACNC: NEGATIVE — SIGNIFICANT CHANGE UP
NITRITE UR-MCNC: NEGATIVE — SIGNIFICANT CHANGE UP
OSMOLALITY SERPL: 308 MOSMOL/KG — HIGH (ref 275–300)
PH UR: 6.5 — SIGNIFICANT CHANGE UP (ref 5–8)
POTASSIUM SERPL-MCNC: 3.5 MMOL/L — SIGNIFICANT CHANGE UP (ref 3.5–5.3)
POTASSIUM SERPL-MCNC: 4 MMOL/L — SIGNIFICANT CHANGE UP (ref 3.5–5.3)
POTASSIUM SERPL-MCNC: 4.7 MMOL/L — SIGNIFICANT CHANGE UP (ref 3.5–5.3)
POTASSIUM SERPL-SCNC: 3.5 MMOL/L — SIGNIFICANT CHANGE UP (ref 3.5–5.3)
POTASSIUM SERPL-SCNC: 4 MMOL/L — SIGNIFICANT CHANGE UP (ref 3.5–5.3)
POTASSIUM SERPL-SCNC: 4.7 MMOL/L — SIGNIFICANT CHANGE UP (ref 3.5–5.3)
PROT UR-MCNC: NEGATIVE MG/DL — SIGNIFICANT CHANGE UP
RBC CASTS # UR COMP ASSIST: 0 /HPF — SIGNIFICANT CHANGE UP (ref 0–4)
SODIUM SERPL-SCNC: 133 MMOL/L — LOW (ref 135–145)
SODIUM SERPL-SCNC: 136 MMOL/L — SIGNIFICANT CHANGE UP (ref 135–145)
SODIUM SERPL-SCNC: 144 MMOL/L — SIGNIFICANT CHANGE UP (ref 135–145)
SP GR SPEC: 1 — SIGNIFICANT CHANGE UP (ref 1–1.03)
SP GR UR STRIP: 1 — SIGNIFICANT CHANGE UP (ref 1–1.03)
SQUAMOUS # UR AUTO: 1 /HPF — SIGNIFICANT CHANGE UP (ref 0–5)
UROBILINOGEN FLD QL: 0.2 MG/DL — SIGNIFICANT CHANGE UP (ref 0.2–1)
WBC UR QL: 0 /HPF — SIGNIFICANT CHANGE UP (ref 0–5)

## 2024-07-12 PROCEDURE — 70553 MRI BRAIN STEM W/O & W/DYE: CPT | Mod: 26

## 2024-07-12 PROCEDURE — 99233 SBSQ HOSP IP/OBS HIGH 50: CPT

## 2024-07-12 PROCEDURE — 99232 SBSQ HOSP IP/OBS MODERATE 35: CPT

## 2024-07-12 RX ORDER — POLYETHYLENE GLYCOL 3350 1 G/G
17 POWDER ORAL
Refills: 0 | Status: DISCONTINUED | OUTPATIENT
Start: 2024-07-12 | End: 2024-07-17

## 2024-07-12 RX ORDER — BISACODYL 5 MG
5 TABLET, DELAYED RELEASE (ENTERIC COATED) ORAL AT BEDTIME
Refills: 0 | Status: DISCONTINUED | OUTPATIENT
Start: 2024-07-12 | End: 2024-07-17

## 2024-07-12 RX ORDER — ACETAMINOPHEN 325 MG
1000 TABLET ORAL ONCE
Refills: 0 | Status: COMPLETED | OUTPATIENT
Start: 2024-07-12 | End: 2024-07-12

## 2024-07-12 RX ORDER — INSULIN LISPRO 100 [IU]/ML
INJECTION, SOLUTION SUBCUTANEOUS
Refills: 0 | Status: DISCONTINUED | OUTPATIENT
Start: 2024-07-12 | End: 2024-07-17

## 2024-07-12 RX ORDER — ENOXAPARIN SODIUM 100 MG/ML
40 INJECTION SUBCUTANEOUS
Refills: 0 | Status: DISCONTINUED | OUTPATIENT
Start: 2024-07-12 | End: 2024-07-17

## 2024-07-12 RX ORDER — POTASSIUM CHLORIDE 600 MG/1
20 TABLET, FILM COATED, EXTENDED RELEASE ORAL ONCE
Refills: 0 | Status: DISCONTINUED | OUTPATIENT
Start: 2024-07-12 | End: 2024-07-12

## 2024-07-12 RX ORDER — POTASSIUM CHLORIDE 600 MG/1
40 TABLET, FILM COATED, EXTENDED RELEASE ORAL ONCE
Refills: 0 | Status: DISCONTINUED | OUTPATIENT
Start: 2024-07-12 | End: 2024-07-12

## 2024-07-12 RX ADMIN — Medication 1000 MILLIGRAM(S): at 01:53

## 2024-07-12 RX ADMIN — OXYCODONE HYDROCHLORIDE 5 MILLIGRAM(S): 100 SOLUTION ORAL at 04:43

## 2024-07-12 RX ADMIN — Medication 20 MILLIGRAM(S): at 17:21

## 2024-07-12 RX ADMIN — OXYCODONE HYDROCHLORIDE 5 MILLIGRAM(S): 100 SOLUTION ORAL at 09:38

## 2024-07-12 RX ADMIN — CEFAZOLIN 100 MILLIGRAM(S): 10 INJECTION, POWDER, FOR SOLUTION INTRAVENOUS at 00:27

## 2024-07-12 RX ADMIN — OXYCODONE HYDROCHLORIDE 5 MILLIGRAM(S): 100 SOLUTION ORAL at 23:34

## 2024-07-12 RX ADMIN — OXYCODONE HYDROCHLORIDE 5 MILLIGRAM(S): 100 SOLUTION ORAL at 15:00

## 2024-07-12 RX ADMIN — DEXAMETHASONE 4 MILLIGRAM(S): 1 TABLET ORAL at 23:24

## 2024-07-12 RX ADMIN — Medication 650 MILLIGRAM(S): at 07:00

## 2024-07-12 RX ADMIN — ATORVASTATIN CALCIUM 20 MILLIGRAM(S): 20 TABLET, FILM COATED ORAL at 23:24

## 2024-07-12 RX ADMIN — POTASSIUM CHLORIDE 40 MILLIEQUIVALENT(S): 600 TABLET, FILM COATED, EXTENDED RELEASE ORAL at 04:42

## 2024-07-12 RX ADMIN — DEXAMETHASONE 4 MILLIGRAM(S): 1 TABLET ORAL at 17:21

## 2024-07-12 RX ADMIN — Medication 20 MILLIGRAM(S): at 06:28

## 2024-07-12 RX ADMIN — POLYETHYLENE GLYCOL 3350 17 GRAM(S): 1 POWDER ORAL at 11:19

## 2024-07-12 RX ADMIN — DEXAMETHASONE 4 MILLIGRAM(S): 1 TABLET ORAL at 06:30

## 2024-07-12 RX ADMIN — OXYCODONE HYDROCHLORIDE 5 MILLIGRAM(S): 100 SOLUTION ORAL at 14:11

## 2024-07-12 RX ADMIN — Medication 2 TABLET(S): at 23:24

## 2024-07-12 RX ADMIN — ONDANSETRON HYDROCHLORIDE 4 MILLIGRAM(S): 2 INJECTION INTRAMUSCULAR; INTRAVENOUS at 00:50

## 2024-07-12 RX ADMIN — DEXAMETHASONE 4 MILLIGRAM(S): 1 TABLET ORAL at 11:18

## 2024-07-12 RX ADMIN — OXYCODONE HYDROCHLORIDE 5 MILLIGRAM(S): 100 SOLUTION ORAL at 10:30

## 2024-07-12 RX ADMIN — Medication 5 MILLIGRAM(S): at 23:24

## 2024-07-12 RX ADMIN — OXYCODONE HYDROCHLORIDE 5 MILLIGRAM(S): 100 SOLUTION ORAL at 04:13

## 2024-07-12 RX ADMIN — Medication 650 MILLIGRAM(S): at 08:00

## 2024-07-12 RX ADMIN — CEFAZOLIN 100 MILLIGRAM(S): 10 INJECTION, POWDER, FOR SOLUTION INTRAVENOUS at 06:28

## 2024-07-12 RX ADMIN — DEXAMETHASONE 4 MILLIGRAM(S): 1 TABLET ORAL at 00:27

## 2024-07-12 RX ADMIN — Medication 400 MILLIGRAM(S): at 01:23

## 2024-07-12 NOTE — DIETITIAN INITIAL EVALUATION ADULT - OTHER INFO
Wt Hx:   Dosing wt 75.7 kG/166.8 lbs.  UBW ~166 lbs with no recent reported changes in wt  Ht: 65 inches   IBW: 125 lbs     IBW%: 133%    Nutrition-Related Concerns:   - On steroid which can elevate BG  - Ordered for KCl no

## 2024-07-12 NOTE — PROGRESS NOTE ADULT - SUBJECTIVE AND OBJECTIVE BOX
NSICU NIGHT PROGRESS NOTE     Please see neurosurgery H&P for HPI.     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     Exam:   HEENT: neck supple    Neurology: Alert, awake, oriented on self, place and year, speech fluent, follows simple commands, PERRL, EOMI, OS sees shadows, VFF to finger counting, gaze midline, face symmetric   UE/LE 5/5  Cardiovascular: S1, S2, Regular rate and rhythm   Pulmonary: Clear to Auscultation, No rales, No rhonchi, No wheezes   Gastrointestinal: Soft, Non-tender, Non-distended   Skin: warm and dry     VITALS:   Vital Signs Last 24 Hrs  T(C): 36.8 (12 Jul 2024 15:28), Max: 36.9 (12 Jul 2024 07:00)  T(F): 98.2 (12 Jul 2024 15:28), Max: 98.5 (12 Jul 2024 07:00)  HR: 81 (12 Jul 2024 19:00) (61 - 86)  BP: --  BP(mean): --  RR: 14 (12 Jul 2024 19:00) (10 - 20)  SpO2: 95% (12 Jul 2024 19:00) (95% - 98%)    Parameters below as of 12 Jul 2024 09:45  Patient On (Oxygen Delivery Method): room air        Drips     Respiratory:    ABG - ( 11 Jul 2024 17:20 )  pH, Arterial: 7.43  pH, Blood: x     /  pCO2: 38    /  pO2: 254   / HCO3: 25    / Base Excess: 1.0   /  SaO2: 100.0               LABS:                        13.6   19.18 )-----------( 275      ( 11 Jul 2024 21:17 )             39.6     07-12    133<L>  |  101  |  19  ----------------------------<  212<H>  4.0   |  18<L>  |  0.56      CAPILLARY BLOOD GLUCOSE          I&O's Summary    11 Jul 2024 07:01  -  12 Jul 2024 07:00  --------------------------------------------------------  IN: 1830 mL / OUT: 620 mL / NET: 1210 mL    12 Jul 2024 07:01  -  12 Jul 2024 22:26  --------------------------------------------------------  IN: 1285 mL / OUT: 1580 mL / NET: -295 mL        Imaging   CXR     EKG     MEDICATION LEVELS:     IVF FLUIDS/MEDICATIONS:   MEDICATIONS  (STANDING):  atorvastatin 20 milliGRAM(s) Oral at bedtime  bisacodyl 5 milliGRAM(s) Oral at bedtime  chlorhexidine 4% Liquid 1 Application(s) Topical <User Schedule>  dexAMETHasone     Tablet 4 milliGRAM(s) Oral every 6 hours  enoxaparin Injectable 40 milliGRAM(s) SubCutaneous <User Schedule>  famotidine    Tablet 20 milliGRAM(s) Oral every 12 hours  polyethylene glycol 3350 17 Gram(s) Oral two times a day  senna 2 Tablet(s) Oral at bedtime    MEDICATIONS  (PRN):  acetaminophen     Tablet .. 650 milliGRAM(s) Oral every 6 hours PRN Mild Pain (1 - 3)  bisacodyl 5 milliGRAM(s) Oral daily PRN Constipation  ondansetron Injectable 4 milliGRAM(s) IV Push every 6 hours PRN Nausea and/or Vomiting  oxyCODONE    IR 5 milliGRAM(s) Oral every 4 hours PRN Moderate Pain (4 - 6)       NSICU NIGHT PROGRESS NOTE     Please see neurosurgery H&P for HPI.     12h events   >300cc UO last 2 hours, fu labs  CSF leak    Exam:   HEENT: neck supple    Neurology: Alert, awake, oriented on self, place and year, speech fluent, follows simple commands, PERRL, EOMI, OS sees shadows, VFF to finger counting, gaze midline, face symmetric   UE/LE 5/5  Cardiovascular: S1, S2, Regular rate and rhythm   Pulmonary: Clear to Auscultation, No rales, No rhonchi, No wheezes   Gastrointestinal: Soft, Non-tender, Non-distended   Skin: warm and dry     VITALS:   Vital Signs Last 24 Hrs  T(C): 36.8 (12 Jul 2024 15:28), Max: 36.9 (12 Jul 2024 07:00)  T(F): 98.2 (12 Jul 2024 15:28), Max: 98.5 (12 Jul 2024 07:00)  HR: 81 (12 Jul 2024 19:00) (61 - 86)  BP: --  BP(mean): --  RR: 14 (12 Jul 2024 19:00) (10 - 20)  SpO2: 95% (12 Jul 2024 19:00) (95% - 98%)    Parameters below as of 12 Jul 2024 09:45  Patient On (Oxygen Delivery Method): room air        Drips     Respiratory:    ABG - ( 11 Jul 2024 17:20 )  pH, Arterial: 7.43  pH, Blood: x     /  pCO2: 38    /  pO2: 254   / HCO3: 25    / Base Excess: 1.0   /  SaO2: 100.0               LABS:                        13.6   19.18 )-----------( 275      ( 11 Jul 2024 21:17 )             39.6     07-12    133<L>  |  101  |  19  ----------------------------<  212<H>  4.0   |  18<L>  |  0.56      CAPILLARY BLOOD GLUCOSE          I&O's Summary    11 Jul 2024 07:01  -  12 Jul 2024 07:00  --------------------------------------------------------  IN: 1830 mL / OUT: 620 mL / NET: 1210 mL    12 Jul 2024 07:01  -  12 Jul 2024 22:26  --------------------------------------------------------  IN: 1285 mL / OUT: 1580 mL / NET: -295 mL        Imaging   CXR     EKG     MEDICATION LEVELS:     IVF FLUIDS/MEDICATIONS:   MEDICATIONS  (STANDING):  atorvastatin 20 milliGRAM(s) Oral at bedtime  bisacodyl 5 milliGRAM(s) Oral at bedtime  chlorhexidine 4% Liquid 1 Application(s) Topical <User Schedule>  dexAMETHasone     Tablet 4 milliGRAM(s) Oral every 6 hours  enoxaparin Injectable 40 milliGRAM(s) SubCutaneous <User Schedule>  famotidine    Tablet 20 milliGRAM(s) Oral every 12 hours  polyethylene glycol 3350 17 Gram(s) Oral two times a day  senna 2 Tablet(s) Oral at bedtime    MEDICATIONS  (PRN):  acetaminophen     Tablet .. 650 milliGRAM(s) Oral every 6 hours PRN Mild Pain (1 - 3)  bisacodyl 5 milliGRAM(s) Oral daily PRN Constipation  ondansetron Injectable 4 milliGRAM(s) IV Push every 6 hours PRN Nausea and/or Vomiting  oxyCODONE    IR 5 milliGRAM(s) Oral every 4 hours PRN Moderate Pain (4 - 6)

## 2024-07-12 NOTE — PROGRESS NOTE ADULT - SUBJECTIVE AND OBJECTIVE BOX
Agustina Brar MD, PGY-4  Endocrinology fellow  Available on [employer] Teams    Interval Events: No acute overnight events. Pt seen and examined. Tolerating PO, no nausea/vomiting. No hypoglycemia symptoms. Denies fevers, chills, CP, SOB, abdominal pain, constipation, diarrhea.      MEDICATIONS  (STANDING):  atorvastatin 20 milliGRAM(s) Oral at bedtime  chlorhexidine 4% Liquid 1 Application(s) Topical <User Schedule>  dexAMETHasone     Tablet 4 milliGRAM(s) Oral every 6 hours  famotidine    Tablet 20 milliGRAM(s) Oral every 12 hours  polyethylene glycol 3350 17 Gram(s) Oral daily  potassium chloride    Tablet ER 20 milliEquivalent(s) Oral once  potassium chloride    Tablet ER 40 milliEquivalent(s) Oral once  senna 2 Tablet(s) Oral at bedtime  sodium chloride 0.9%. 1000 milliLiter(s) (75 mL/Hr) IV Continuous <Continuous>    MEDICATIONS  (PRN):  acetaminophen     Tablet .. 650 milliGRAM(s) Oral every 6 hours PRN Mild Pain (1 - 3)  bisacodyl 5 milliGRAM(s) Oral daily PRN Constipation  ondansetron Injectable 4 milliGRAM(s) IV Push every 6 hours PRN Nausea and/or Vomiting  oxyCODONE    IR 5 milliGRAM(s) Oral every 4 hours PRN Moderate Pain (4 - 6)      Allergies    No Known Allergies    Intolerances          ================PHYSICAL EXAM======================  VITALS: T(C): 36.9 (07-12-24 @ 07:00)  T(F): 98.5 (07-12-24 @ 07:00), Max: 98.5 (07-12-24 @ 07:00)  HR: 77 (07-12-24 @ 09:23) (52 - 86)  BP: 120/68 (07-11-24 @ 21:45) (109/67 - 129/77)  RR:  (10 - 20)  SpO2:  (95% - 98%)  Wt(kg): --  GENERAL: NAD, appears comfortable  EYES: No proptosis, no lid lag, anicteric  HEENT:  Atraumatic, Normocephalic, moist mucous membranes  CARDIOVASCULAR: RRR, no MRG, S1/S2  RESPIRATORY: nonlabored respirations, no wheezing  ABDOMEN: Soft, nontender, nondistended, normal bowel sounds  EXTREMITIES: 2+ peripheral pulses, no edema  PSYCH: Alert and awake  ===================================================    CAPILLARY BLOOD GLUCOSE      POCT Blood Glucose.: 135 mg/dL (11 Jul 2024 21:06)  POCT Blood Glucose.: 105 mg/dL (11 Jul 2024 13:51)      07-12    136  |  103  |  20  ----------------------------<  165<H>  3.5   |  20<L>  |  0.60    eGFR: 112    Ca    8.6      07-12    TPro  6.5  /  Alb  3.8  /  TBili  0.6  /  DBili  x   /  AST  17  /  ALT  41  /  AlkPhos  80  07-10          Thyroid Function Tests:  07-05 @ 06:49 TSH -- FreeT4 1.0 T3 -- Anti TPO <10.0 Anti Thyroglobulin Ab -- TSI --  07-04 @ 06:44 TSH -- FreeT4 1.0 T3 -- Anti TPO -- Anti Thyroglobulin Ab -- TSI --                       Agustina Brar MD, PGY-4  Endocrinology fellow  Available on [employer] Teams    Interval Events: s/p TSSR POD1. Pt seen and examined. Tolerating PO, no nausea/vomiting. No HA, vision changes, fatigue. Denies fevers, chills, CP, SOB, abdominal pain.      MEDICATIONS  (STANDING):  atorvastatin 20 milliGRAM(s) Oral at bedtime  chlorhexidine 4% Liquid 1 Application(s) Topical <User Schedule>  dexAMETHasone     Tablet 4 milliGRAM(s) Oral every 6 hours  famotidine    Tablet 20 milliGRAM(s) Oral every 12 hours  polyethylene glycol 3350 17 Gram(s) Oral daily  potassium chloride    Tablet ER 20 milliEquivalent(s) Oral once  potassium chloride    Tablet ER 40 milliEquivalent(s) Oral once  senna 2 Tablet(s) Oral at bedtime  sodium chloride 0.9%. 1000 milliLiter(s) (75 mL/Hr) IV Continuous <Continuous>    MEDICATIONS  (PRN):  acetaminophen     Tablet .. 650 milliGRAM(s) Oral every 6 hours PRN Mild Pain (1 - 3)  bisacodyl 5 milliGRAM(s) Oral daily PRN Constipation  ondansetron Injectable 4 milliGRAM(s) IV Push every 6 hours PRN Nausea and/or Vomiting  oxyCODONE    IR 5 milliGRAM(s) Oral every 4 hours PRN Moderate Pain (4 - 6)      Allergies    No Known Allergies    Intolerances          ================PHYSICAL EXAM======================  VITALS: T(C): 36.9 (07-12-24 @ 07:00)  T(F): 98.5 (07-12-24 @ 07:00), Max: 98.5 (07-12-24 @ 07:00)  HR: 77 (07-12-24 @ 09:23) (52 - 86)  BP: 120/68 (07-11-24 @ 21:45) (109/67 - 129/77)  RR:  (10 - 20)  SpO2:  (95% - 98%)  Wt(kg): --  GENERAL: NAD, appears comfortable  EYES: No proptosis, no lid lag, anicteric  HEENT:  Atraumatic, Normocephalic, moist mucous membranes  CARDIOVASCULAR: RRR, no MRG, S1/S2  RESPIRATORY: nonlabored respirations, no wheezing  ABDOMEN: Soft, nontender, nondistended, normal bowel sounds  PSYCH: Alert and awake  ===================================================    CAPILLARY BLOOD GLUCOSE      POCT Blood Glucose.: 135 mg/dL (11 Jul 2024 21:06)  POCT Blood Glucose.: 105 mg/dL (11 Jul 2024 13:51)      07-12    136  |  103  |  20  ----------------------------<  165<H>  3.5   |  20<L>  |  0.60    eGFR: 112    Ca    8.6      07-12    TPro  6.5  /  Alb  3.8  /  TBili  0.6  /  DBili  x   /  AST  17  /  ALT  41  /  AlkPhos  80  07-10          Thyroid Function Tests:  07-05 @ 06:49 TSH -- FreeT4 1.0 T3 -- Anti TPO <10.0 Anti Thyroglobulin Ab -- TSI --  07-04 @ 06:44 TSH -- FreeT4 1.0 T3 -- Anti TPO -- Anti Thyroglobulin Ab -- TSI --

## 2024-07-12 NOTE — DIETITIAN INITIAL EVALUATION ADULT - PERTINENT LABORATORY DATA
07-12    136  |  103  |  20  ----------------------------<  165<H>  3.5   |  20<L>  |  0.60    Ca    8.6      12 Jul 2024 05:45    POCT Blood Glucose.: 135 mg/dL (07-11-24 @ 21:06)

## 2024-07-12 NOTE — PROGRESS NOTE ADULT - ASSESSMENT
43 y/o F, POD#1 from TSRP with high flow leak, repaired with duragen inlay, duraguard button, L nasoseptal flap. Pt seen and examined at bedside, c/o moderate HA that is relieved with pain meds. Pt reports that left vision is improving slightly. Throughout the night, endorsed intermittent PND but denied metallic or salty taste. On exam, no evidence of active epistaxis or CSF leak b/l.

## 2024-07-12 NOTE — PROGRESS NOTE ADULT - SUBJECTIVE AND OBJECTIVE BOX
ENT ISSUE/POD: 44y POD#1 from TSRP with high flow leak, repaired with duragen inlay, duraguard button, L nasoseptal flap.     HPI: 45 y/o F, POD#1 from TSRP with high flow leak, repaired with duragen inlay, duraguard button, L nasoseptal flap. Pt seen and examined at bedside, c/o moderate HA that is relieved with pain meds. Pt reports that left vision is improving slightly. Throughout the night, endorsed intermittent PND but denied metallic or salty taste. Currently denies PND, salty/metallic taste, or abnormal nasal discharge.                 PAST MEDICAL & SURGICAL HISTORY:  Hyperlipidemia      H/O: hysterectomy        Allergies    No Known Allergies    Intolerances      MEDICATIONS  (STANDING):  atorvastatin 20 milliGRAM(s) Oral at bedtime  chlorhexidine 4% Liquid 1 Application(s) Topical <User Schedule>  dexAMETHasone     Tablet 4 milliGRAM(s) Oral every 6 hours  famotidine    Tablet 20 milliGRAM(s) Oral every 12 hours  polyethylene glycol 3350 17 Gram(s) Oral daily  senna 2 Tablet(s) Oral at bedtime  sodium chloride 0.9%. 1000 milliLiter(s) (75 mL/Hr) IV Continuous <Continuous>    MEDICATIONS  (PRN):  acetaminophen     Tablet .. 650 milliGRAM(s) Oral every 6 hours PRN Mild Pain (1 - 3)  bisacodyl 5 milliGRAM(s) Oral daily PRN Constipation  ondansetron Injectable 4 milliGRAM(s) IV Push every 6 hours PRN Nausea and/or Vomiting  oxyCODONE    IR 5 milliGRAM(s) Oral every 4 hours PRN Moderate Pain (4 - 6)      Social History: see consult    Family history: see consult    ROS:   ENT: all negative except as noted in HPI   Pulm: denies SOB, cough, hemoptysis  Neuro: denies numbness/tingling, loss of sensation  Endo: denies heat/cold intolerance, excessive sweating      Vital Signs Last 24 Hrs  T(C): 36.2 (12 Jul 2024 03:00), Max: 36.6 (11 Jul 2024 12:30)  T(F): 97.1 (12 Jul 2024 03:00), Max: 97.9 (11 Jul 2024 12:30)  HR: 66 (12 Jul 2024 06:00) (52 - 81)  BP: 120/68 (11 Jul 2024 21:45) (107/67 - 129/77)  BP(mean): 87 (11 Jul 2024 21:45) (85 - 93)  RR: 11 (12 Jul 2024 06:00) (10 - 19)  SpO2: 96% (12 Jul 2024 06:00) (95% - 98%)    Parameters below as of 11 Jul 2024 21:45  Patient On (Oxygen Delivery Method): room air                              13.6   19.18 )-----------( 275      ( 11 Jul 2024 21:17 )             39.6    07-12    136  |  103  |  20  ----------------------------<  165<H>  3.5   |  20<L>  |  0.60    Ca    8.6      12 Jul 2024 05:45         PHYSICAL EXAM:  Gen: NAD  Skin: No rashes, bruises, or lesions  Head: Normocephalic, Atraumatic  Face: no edema, erythema, or fluctuance. Parotid glands soft without mass  Eyes: no scleral injection  Nose: Nares bilaterally with dissolvable packings, no discharge  Mouth: No Stridor / Drooling / Trismus.  Mucosa moist, tongue/uvula midline, oropharynx clear  Neck: Flat, supple, no lymphadenopathy, trachea midline, no masses  Lymphatic: No lymphadenopathy  Resp: breathing easily, no stridor  Neuro: facial nerve intact, no facial droop         awake/alert/oriented to person, place, time/situation

## 2024-07-12 NOTE — DIETITIAN INITIAL EVALUATION ADULT - PERTINENT MEDS FT
(0) blue, pale MEDICATIONS  (STANDING):  atorvastatin 20 milliGRAM(s) Oral at bedtime  chlorhexidine 4% Liquid 1 Application(s) Topical <User Schedule>  dexAMETHasone     Tablet 4 milliGRAM(s) Oral every 6 hours  famotidine    Tablet 20 milliGRAM(s) Oral every 12 hours  polyethylene glycol 3350 17 Gram(s) Oral daily  potassium chloride    Tablet ER 40 milliEquivalent(s) Oral once  potassium chloride    Tablet ER 20 milliEquivalent(s) Oral once  senna 2 Tablet(s) Oral at bedtime  sodium chloride 0.9%. 1000 milliLiter(s) (75 mL/Hr) IV Continuous <Continuous>    MEDICATIONS  (PRN):  acetaminophen     Tablet .. 650 milliGRAM(s) Oral every 6 hours PRN Mild Pain (1 - 3)  bisacodyl 5 milliGRAM(s) Oral daily PRN Constipation  ondansetron Injectable 4 milliGRAM(s) IV Push every 6 hours PRN Nausea and/or Vomiting  oxyCODONE    IR 5 milliGRAM(s) Oral every 4 hours PRN Moderate Pain (4 - 6)

## 2024-07-12 NOTE — PHYSICAL THERAPY INITIAL EVALUATION ADULT - DIAGNOSIS, PT EVAL
N/A
Pt presents with L eye blurriness, head pain, impairments in strength, balance, endurance impacting ability to perform ADLs and functional mobility

## 2024-07-12 NOTE — DIETITIAN INITIAL EVALUATION ADULT - EDUCATION DIETARY MODIFICATIONS
RD emphasized importance of adequate protein-energy intake. No questions at this time. Pt made aware RD to remain available./(2) meets goals/outcomes/verbalization

## 2024-07-12 NOTE — OCCUPATIONAL THERAPY INITIAL EVALUATION ADULT - PATIENT PROFILE REVIEW, REHAB EVAL
yes
yes
Intermediate Repair Preamble Text (Leave Blank If You Do Not Want): Undermining was performed with blunt dissection.

## 2024-07-12 NOTE — PHYSICAL THERAPY INITIAL EVALUATION ADULT - GENERAL OBSERVATIONS, REHAB EVAL
Received supine upright in bed in NAD, agreeable to PT eval.
Pt received in NSCU in NAD, +A-line, +enrique, +tele, +cont pulse ox, +BP cuff, VSS, +Urdu speaking video  Zurdo ID#[id number] used for session, agreeable to participate in therapy at this time

## 2024-07-12 NOTE — PROGRESS NOTE ADULT - ASSESSMENT
ASSESSMENT: TSP resection of pituitary adenoma, POD 1    N   #07/11/24 s/p expanded endonasal approach for non functioning pituitary adenoma with expected CSF leak due to arachnoid disection with nasoseptal flap repair  MRI wwo post-op   wean dexamethasone   Skull base precautions/monitor CSF leakage    #Pain: Tylenol and oxycodone  #Activity: OOB     CV   #-160mmHg    P   #Pituitary precautions (no incentive spirometry, no straws, no BIPAP)    R   #Strict I+Os  IVL   Drink to thirst    GI   #Diet: Dysphagia screen and then advance diet as tolerated  #Senna miralax  Last BM PTA     ID   Afebrile     ENDO:   #Goal euglycemia (-180)  #Pituitary labs: Cortisol AM x 3d  TSH, FSH, LH, Prolactin, T4, in AM POD1   #Monitor for DI, BMP q8h Osm, Ulytes spec gravity   #HLD continue atorvastatin     H  #DVT ppx:   SCDs   Chemoprophylaxis tomorrow  LED 7/4 no DVT    #CODE STATUS: FULL CODE    #DISPOSITION: ICU  ASSESSMENT: TSP resection of pituitary adenoma, POD 1    N   #07/11/24 s/p expanded endonasal approach for non functioning pituitary adenoma with 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 with nasoseptal flap repair  MRI wwo post-op   wean dexamethasone   #CSF leak  Skull base precautions/monitor CSF leakage    #Pain: Tylenol and oxycodone  #Activity: OOB     CV   #-160mmHg    P   #Pituitary precautions (no incentive spirometry, no straws, no BIPAP)    R   #Strict I+Os  Drink to thirst    GI   #Diet: regular  #Senna miralax  Last BM PTA     ID   Afebrile     ENDO:   #Goal euglycemia (-180)  ISS while on dexamethasone   #Pituitary labs: Cortisol AM x 3d  TSH, FSH, LH, Prolactin, T4, in AM POD1   #Monitor for DI, BMP q8h Osm, Ulytes spec gravity   #HLD continue atorvastatin     H  #DVT ppx:   SCDs   Chemoprophylaxis   LED 7/4 no DVT - need to repeat     #CODE STATUS: FULL CODE    #DISPOSITION: ICU

## 2024-07-12 NOTE — PROGRESS NOTE ADULT - ASSESSMENT
ASSESSMENT: TSP resection of pituitary adenoma, POD1    N   #07/11/24 s/p expanded endonasal approach for non functioning pituitary adenoma with expected CSF leak due to arachnoid disection with nasoseptal flap repair  MRI wwo post-op   wean dexamethasone   Skull base precautions/monitor CSF leakage    #Pain: Tylenol and oxycodone  #Activity: [] OOB as tolerated [] Bedrest [x] PT [x] OT [] PMNR    CV   #-160mmHg    P   #Pituitary precautions (no incentive spirometry, no straws, no BIPAP)  Keep RA   Keep SPo2 Greater than 92%    R   #Strict I+Os  IVL     GI   #Diet: Dysphagia screen and then advance diet as tolerated  #Senna miralax  Last BM PTA     ID   afebrile        ENDO:   #Goal euglycemia (-180)  #Pituitary labs: Cortisol AM x 3d  TSH, FSH, LH, Prolactin, T4, in AM POD1   #Monitor for DI, BMP q8h Osm, Ulytes spec gravity   #HLD continue atorvastatin     H  #DVT ppx:   SCDs for now   LED 7/4 no DVT    #CODE STATUS: FULL CODE    #DISPOSITION: ICU  ASSESSMENT: TSP resection of pituitary adenoma, POD1    N   #07/11/24 s/p expanded endonasal approach for non functioning pituitary adenoma with expected CSF leak due to arachnoid disection with nasoseptal flap repair  MRI wwo post-op   wean dexamethasone   Skull base precautions/monitor CSF leakage    #Pain: Tylenol and oxycodone  #Activity: [] OOB as tolerated [] Bedrest [x] PT [x] OT [] PMNR    CV   #-160mmHg    P   #Pituitary precautions (no incentive spirometry, no straws, no BIPAP)  Keep RA   Keep SPo2 Greater than 92%    R   #Strict I+Os  IVL     GI   #Diet: Dysphagia screen and then advance diet as tolerated  #Senna miralax  Last BM PTA     ID   afebrile        ENDO:   #Goal euglycemia (-180)  #Pituitary labs: Cortisol AM x 3d  TSH, FSH, LH, Prolactin, T4, in AM POD1   Endocrine ordered STAT- TSH, FT4      #Monitor for DI-- BMP q8h Osm, Ulytes spec gravity   #HLD continue atorvastatin     H  #DVT ppx:   SCDs for now   LED 7/4 no DVT  Dopplers pending     #CODE STATUS: FULL CODE    #DISPOSITION: ICU

## 2024-07-12 NOTE — PHYSICAL THERAPY INITIAL EVALUATION ADULT - PLANNED THERAPY INTERVENTIONS, PT EVAL
STAIR GOAL: Pt will negotiate 2 FOS independently with or without HR assist as needed within 2 weeks./gait training/transfer training

## 2024-07-12 NOTE — OCCUPATIONAL THERAPY INITIAL EVALUATION ADULT - COGNITIVE, VISUAL PERCEPTUAL, OT EVAL
GOAL: Pt will utilize adaptive vision strategies, e.g turning head, to safely navigate the environment independently within 4 weeks
GOAL: Pt will utilize adaptive vision strategies, e.g turning head, to safely navigate the environment independently within 4 weeks

## 2024-07-12 NOTE — PROGRESS NOTE ADULT - SUBJECTIVE AND OBJECTIVE BOX
SUMMARY:    OVERNIGHT EVENTS:     ADMISSION SCORES:   GCS: HH: MF: NIHSS: ICH Score:    SEDATION SCORES:  RASS: CAM-ICU:     REVIEW OF SYSTEMS:    VITALS: [] Reviewed    IMAGING/DATA: [] Reviewed    IVF FLUIDS/MEDICATIONS: [] Reviewed    ALLERGIES: Allergies    No Known Allergies    Intolerances        DEVICES:   [] Restraints [] PIVs [] ET tube [] central line [] PICC [] arterial line [] enrique [] NGT/OGT [] EVD [] LD [] DILCIA/HMV [] LiCOX [] ICP monitor [] Trach [] PEG [] Chest Tube [] other:    EXAMINATION:  General: No acute distress  HEENT: Anicteric sclerae  Cardiac: [id number]  Lungs: Clear  Abdomen: Soft, non-tender, +BS  Extremities: No c/c/e  Skin/Incision Site: Clean, dry and intact  Neurologic: Awake, alert, fully oriented, follows commands,   PERRL, VFFtc, EOMI, face symmetric, tongue midline,   full strength x4   Sensation intact x4   Coordination FTN x2  SUMMARY:44F Bengali speaking ([facility], [facility] xfer) p/f progressive HAs/L eye blurry vision/short term mem loss x1mo, w/ acutely worse Lt side HA x2d. At 11AM yesterday suddenly could only see figures/shadows from Lt eye. Optho noted Lt optic nn. dysfxn/no papilledema. 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 Exam: Wide awake, Ox3, pupils dilated by optho, VFF to finger counting b/l but Lt eye can only see shadows/figure outlines, no drift o/w intact  (04 Jul 2024 02:28)    OVERNIGHT EVENTS: POD 1 of TSP resection of pituitary adenoma    ADMISSION SCORES:   GCS: HH: MF: NIHSS: ICH Score:    SEDATION SCORES:  RASS: CAM-ICU:     REVIEW OF SYSTEMS:    VITALS: [x] Reviewed  ICU Vital Signs Last 24 Hrs  T(C): 36.8 (12 Jul 2024 15:28), Max: 36.9 (12 Jul 2024 07:00)  T(F): 98.2 (12 Jul 2024 15:28), Max: 98.5 (12 Jul 2024 07:00)  HR: 63 (12 Jul 2024 15:28) (61 - 86)  BP: 120/68 (11 Jul 2024 21:45) (120/68 - 129/77)  BP(mean): 87 (11 Jul 2024 21:45) (85 - 93)  ABP: 132/71 (12 Jul 2024 15:28) (108/55 - 145/78)  ABP(mean): 97 (12 Jul 2024 15:28) (74 - 107)  RR: 12 (12 Jul 2024 15:28) (10 - 20)  SpO2: 97% (12 Jul 2024 15:28) (95% - 98%)    O2 Parameters below as of 12 Jul 2024 09:45  Patient On (Oxygen Delivery Method): room air            IMAGING/DATA: [x] Reviewed    IVF FLUIDS/MEDICATIONS: [x] Reviewed    ALLERGIES: Allergies    No Known Allergies    Intolerances        DEVICES:   [] Restraints [] PIVs [] ET tube [] central line [] PICC [] arterial line [] enrique [] NGT/OGT [] EVD [] LD [] DILCIA/HMV [] LiCOX [] ICP monitor [] Trach [] PEG [] Chest Tube [] other:    EXAMINATION:  General: No acute distress  HEENT: Anicteric sclerae  Cardiac: [id number]  Lungs: Clear  Abdomen: Soft, non-tender, +BS  Extremities: No c/c/e  Skin/Incision Site: Clean, dry and intact  Neurologic: Awake, alert, fully oriented, follows commands,   PERRL, VFFtc, EOMI, face symmetric, tongue midline,   full strength x4   Sensation intact x4   Coordination FTN x2

## 2024-07-12 NOTE — PHYSICAL THERAPY INITIAL EVALUATION ADULT - PERTINENT HX OF CURRENT PROBLEM, REHAB EVAL
44F hx of HLD Faroese speaking ([facility] xfer) p/f progressive HAs/L eye blurry vision and headache. 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 NSGY planning OR for patient - TSP for resection of tumor.
44F Israeli speaking F presents ([facility] xfer) p/f progressive HAs/L eye blurry vision/short term mem loss x1mo, w/ acutely worse Lt side HA x2d. At 11AM yesterday suddenly could only see figures/shadows from Lt eye. Optho noted Lt optic nn. dysfxn/no papilledema. 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 Exam: Wide awake, Ox3, pupils dilated by optho, VFF to finger counting b/l but Lt eye can only see shadows/figure outlines, no drift o/w intact. (7/4) MR Brain, Orbits: Large homogeneously thin enhancing sellar mass extending into the suprasellar cistern with optic chiasm compression as well as extending into the inferior left frontal lobe extra-axially compressing the left prechiasmal optic nerve consistent with a pituitary macroadenoma. No evidence of pituitary apoplexy. now s/p Transsphenoidal resection of pituitary tumor 7/11/24 with MD Goncalves.

## 2024-07-12 NOTE — PROGRESS NOTE ADULT - ATTENDING COMMENTS
Agree with Dr. Brar's assessment and plan as outlined above. Reviewed all pertinent labs, and imaging studies. Modifications made as indicated above. Following this 44 F with history of thyroid goiter, HLD for sellar mass of 1.9 cm. Preop pituitary workup shows diluted prolactin of 108. TSH 6.40, no FT4. 2 am cortisol 4 while on dexamethasone. Consistent with nonfunctioning pituitary adenoma. Now s/p TSP on 7/11. Doing well. Monitor for DI. Repeat TSH and FT4 tomorrow am. Rest of the plan as above.

## 2024-07-12 NOTE — OCCUPATIONAL THERAPY INITIAL EVALUATION ADULT - GENERAL OBSERVATIONS, REHAB EVAL
pt received semi-supine in bed, daughter present, +IVL
pt received semi-supine in bed, daughter present, +IVL

## 2024-07-12 NOTE — PHYSICAL THERAPY INITIAL EVALUATION ADULT - IMPAIRMENTS FOUND, PT EVAL
aerobic capacity/endurance/gait, locomotion, and balance aerobic capacity/endurance/gait, locomotion, and balance/visual motor

## 2024-07-12 NOTE — PHYSICAL THERAPY INITIAL EVALUATION ADULT - ADDITIONAL COMMENTS
pt lives with her 2 daughters in an apartment with 2 flights of stairs +tub shower. 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
lives with daughters in apartment with 2 FOS to enter +HR, IND without AD at baseline; works as manager for cleaning services for [employer]

## 2024-07-12 NOTE — OCCUPATIONAL THERAPY INITIAL EVALUATION ADULT - PERTINENT HX OF CURRENT PROBLEM, REHAB EVAL
44F Canadian speaking ([facility] xfer) p/f progressive HAs/L eye blurry vision/short term mem loss x1mo, w/ acutely worse Lt side HA x2d. At 11AM yesterday suddenly could only see figures/shadows from Lt eye. Optho noted Lt optic nn. dysfxn/no papilledema. 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 Exam: Wide awake, Ox3, pupils dilated by optho, VFF to finger counting b/l but Lt eye can only see shadows/figure outlines, no drift o/w intact. (7/4) MR Brain, Orbits: Large homogeneously thin enhancing sellar mass extending into the suprasellar cistern with optic chiasm compression as well as extending into the inferior left frontal lobe extra-axially compressing the left prechiasmal optic nerve consistent with a pituitary macroadenoma. No evidence of pituitary apoplexy. now s/p Transsphenoidal resection of pituitary tumor 11-Jul-2024.

## 2024-07-12 NOTE — OCCUPATIONAL THERAPY INITIAL EVALUATION ADULT - VISUAL ASSESSMENT: CONVERGENCE
Dat Concepcion is a 10year old female.   Patient presents with:  Menstrual Problem: Heavy periods - looking to get on [facility]      HPI:   Complaints of heavy periods- regular tampons -   On heavy days - 3 times a day. -green- -   Occasional clots-  Mucous,
Healthy   • No Known Problems Father         Healthy   • No Known Problems Brother         Healthy        Allergies  No Known Allergies    REVIEW OF SYSTEMS:   GENERAL HEALTH: feels well otherwise  HEENT: denies complaints  SKIN: denies any unusual skin le
return for gonorrhea chlamydia–just voided within an hour ago.
normal
normal

## 2024-07-12 NOTE — DIETITIAN INITIAL EVALUATION ADULT - ORAL INTAKE PTA/DIET HISTORY
Pt was eating well with no changes in appetite. Pt was not following therapeutic diet. Denies oral nutrient supplement use; sometimes took Vitamin D. Denies Hx of chewing or swallowing issues. Confirms no known food allergies.

## 2024-07-12 NOTE — OCCUPATIONAL THERAPY INITIAL EVALUATION ADULT - DIAGNOSIS, OT EVAL
Patient demonstrates visual impairments limiting ADLs.
Patient demonstrates visual impairments limiting ADLs.

## 2024-07-12 NOTE — DIETITIAN INITIAL EVALUATION ADULT - NSFNSGIIOFT_GEN_A_CORE
11 Jul 2024 07:01  -  12 Jul 2024 07:00  --------------------------------------------------------  IN:    Oral Fluid: 480 mL    sodium chloride 0.9%: 525 mL    sodium chloride 0.9%: 300 mL  Total IN: 1305 mL    OUT:    Indwelling Catheter - Urethral (mL): 620 mL  Total OUT: 620 mL    Total NET: 685 mL      12 Jul 2024 07:01  -  12 Jul 2024 10:28  --------------------------------------------------------  IN:    sodium chloride 0.9%: 150 mL  Total IN: 150 mL    OUT:    Indwelling Catheter - Urethral (mL): 40 mL  Total OUT: 40 mL    Total NET: 110 mL

## 2024-07-12 NOTE — PROGRESS NOTE ADULT - ASSESSMENT
44F hx of thyroid goiter, HLD presenting for progressive HAs/L eye vision deficit. 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 Endocrine consulted for: Sellar mass, possibly pituitary adenoma    #Sellar mass, possibly pituitary adenoma  1.9cm sellar mass with L optic nerve compression with visual deficits. +headache, L visual deficits, palpitations, cold sweats. Otherwise no significant sx: No fatigue, temperature intolerance, n/v/d, constipation, lightheadedness or dizziness, galactorrhea, breast fullness. Has hx of hysterectomy.    Preop pituitary workup:  - Prolactin 153, diluted 108.  - TSH 6.40 mildly elevated, Total T4 6.8  - 2am cortisol was 4.0  - Estradiol 36, LH 7.4, FSH 9.4  - FT4 1.0  - TPO antibodies <10  - IGF1 155    Her recent TFT's from 6/24/24 showed TSH of 3.43, FT4 of 0.9 outpatient.    Suspect that this is likely non-functioning pituitary adenoma given most pituitary hormone levels without significant abnormalities.  TSH mildly elevated, indicating pituitary function,  very recent normal TSH less than 1 week ago.  Prolactin mildly elevated likely stalk-effect from mass- size does not correlate with level   Patient was started on dexamethasone 4mg IV q6h since 5am 7/4; not able to assess HPA axis.    Recommendations  - Patient on Decadron 4 mg q6. Unable to assess HPA axis while on steroids  - s/p TSSR 7/11  - repeat pituitary labs when patient is post-op: FT4, TSH, IGF-1, LH, FSH, prolactin    #Thyroid goiter  She has thyroid goiter for which she follows up with Endocrinologist Dr. Espino ([facility]) since 2021. Only been monitoring with US.   - Follow up outpatient with Dr. Espino    #HLD  - , from 96 in June  - c/w atorva 20 for now      **************************RECOMMENDATIONS NOT FINAL UNTIL SIGNED BY ATTENDING**************************  44F hx of thyroid goiter, HLD presenting for progressive HAs/L eye vision deficit. 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 Endocrine consulted for: Sellar mass, possibly pituitary adenoma    #Sellar mass, possibly pituitary adenoma  1.9cm sellar mass with L optic nerve compression with visual deficits. +headache, L visual deficits, palpitations, cold sweats. Otherwise no significant sx: No fatigue, temperature intolerance, n/v/d, constipation, lightheadedness or dizziness, galactorrhea, breast fullness. Has hx of hysterectomy.    Preop pituitary workup:  - Prolactin 153, diluted 108.  - TSH 6.40 mildly elevated, Total T4 6.8  - 2am cortisol was 4.0  - Estradiol 36, LH 7.4, FSH 9.4  - FT4 1.0  - TPO antibodies <10  - IGF1 155    Her recent TFT's from 6/24/24 showed TSH of 3.43, FT4 of 0.9 outpatient.    Suspect that this is likely non-functioning pituitary adenoma given most pituitary hormone levels without significant abnormalities.  TSH mildly elevated, indicating pituitary function,  very recent normal TSH less than 1 week ago.  Prolactin mildly elevated likely stalk-effect from mass- size does not correlate with level   Patient was started on dexamethasone 4mg IV q6h since 5am 7/4; not able to assess HPA axis.    Recommendations  - Patient on Decadron 4 mg q6. Unable to assess HPA axis while on steroids  - s/p TSSR 7/11  - repeat pituitary labs when patient is post-op: FT4, TSH, IGF-1, LH, FSH, prolactin    #Thyroid goiter  She has thyroid goiter for which she follows up with Endocrinologist Dr. Espino ([facility]) since 2021. Only been monitoring with US.   - Follow up outpatient with Dr. Espino    #HLD  - , from 96 in June  - c/w atorva 20 for now      **************************RECOMMENDATIONS NOT FINAL UNTIL SIGNED BY ATTENDING**************************  44F hx of thyroid goiter, HLD presenting for progressive HAs/L eye vision deficit. 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 Endocrine consulted for: Sellar mass, possibly pituitary adenoma    #Sellar mass, possibly pituitary adenoma  - 1.9cm sellar mass with L optic nerve compression with visual deficits with associated elevation in prolactin 153, diluted prolactin 108. Mildly elevated TSH 6.40, FT4 1.0, 2am cortisol 4.0, estradiol 36, LH 7.4, FSH 9.4, . TPO Ab <10  - Suspect that this is likely non-functioning pituitary adenoma given most pituitary hormone levels without significant abnormalities.  - s/p resection 7/11, denies any symptoms post-op  - UOP 40-50 cc/hr 7/12  - Patient was started on dexamethasone 4mg IV q6h since 5am 7/4; not able to assess HPA axis.    Recommendations  - Patient on Decadron 4 mg q6. Unable to assess HPA axis while on steroids  - repeat TSH, FT4   - may repeat other pituitary labs when patient as OP: IGF-1, LH, FSH, prolactin    *DI WATCH*:  - Goal Na 140-145  - Please monitor strict I/O, sodium levels q12h, and urine osm q12hrs  - DI is suspected if UOP is >250cc/hr x 2 consecutive hours or if >500cc/hr x 1 hr - if this occurs please check STAT serum Na, urine osm, urine specific gravity  - if specific gravity <1.005/Urine Osm <300 and Na is rising - likely DI, please dose DDAVP 0.05mg PO x1 (or DDAVP 0.5mcg IV if no PO access) and bolus 1/2 NS to match half the output (for example, if net negative 2Liters can give 1Liter 1/2NS if pt is unable to keep up with output with PO intake) - continue DI watch  - If Na continues to increase despite a a DDAVP dose and 1/2NS fluid bolus please inform endocrine   - Please make sure pt has access to water and encourage her to drink to thirst     #Thyroid goiter  She has thyroid goiter for which she follows up with Endocrinologist Dr. Espino ([facility]) since 2021. Only been monitoring with US.   - Follow up outpatient with Dr. Espino    #HLD  - , from 96 in June  - c/w atorva 20 for now    D/w Dr. Farris

## 2024-07-12 NOTE — OCCUPATIONAL THERAPY INITIAL EVALUATION ADULT - VISUAL ACUITY
R eye WFL, L eye blurry vision - slight improvement with peripheral vision
R eye WFL, L eye blurry vision - slight improvement with peripheral vision

## 2024-07-12 NOTE — OCCUPATIONAL THERAPY INITIAL EVALUATION ADULT - ADDITIONAL COMMENTS
pt lives with her 2 daughters in an apartment with 2 flights of stairs +tub shower. 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
pt lives with her 2 daughters in an apartment with 2 flights of stairs +tub shower. 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

## 2024-07-13 LAB
ADD ON TEST-SPECIMEN IN LAB: SIGNIFICANT CHANGE UP
ADD ON TEST-SPECIMEN IN LAB: SIGNIFICANT CHANGE UP
ANION GAP SERPL CALC-SCNC: 11 MMOL/L — SIGNIFICANT CHANGE UP (ref 5–17)
ANION GAP SERPL CALC-SCNC: 12 MMOL/L — SIGNIFICANT CHANGE UP (ref 5–17)
ANION GAP SERPL CALC-SCNC: 13 MMOL/L — SIGNIFICANT CHANGE UP (ref 5–17)
BUN SERPL-MCNC: 13 MG/DL — SIGNIFICANT CHANGE UP (ref 7–23)
BUN SERPL-MCNC: 14 MG/DL — SIGNIFICANT CHANGE UP (ref 7–23)
BUN SERPL-MCNC: 15 MG/DL — SIGNIFICANT CHANGE UP (ref 7–23)
CALCIUM SERPL-MCNC: 8.8 MG/DL — SIGNIFICANT CHANGE UP (ref 8.4–10.5)
CALCIUM SERPL-MCNC: 9.1 MG/DL — SIGNIFICANT CHANGE UP (ref 8.4–10.5)
CALCIUM SERPL-MCNC: 9.8 MG/DL — SIGNIFICANT CHANGE UP (ref 8.4–10.5)
CHLORIDE SERPL-SCNC: 105 MMOL/L — SIGNIFICANT CHANGE UP (ref 96–108)
CHLORIDE SERPL-SCNC: 108 MMOL/L — SIGNIFICANT CHANGE UP (ref 96–108)
CHLORIDE SERPL-SCNC: 109 MMOL/L — HIGH (ref 96–108)
CO2 SERPL-SCNC: 21 MMOL/L — LOW (ref 22–31)
CO2 SERPL-SCNC: 23 MMOL/L — SIGNIFICANT CHANGE UP (ref 22–31)
CO2 SERPL-SCNC: 27 MMOL/L — SIGNIFICANT CHANGE UP (ref 22–31)
CREAT SERPL-MCNC: 0.57 MG/DL — SIGNIFICANT CHANGE UP (ref 0.5–1.3)
CREAT SERPL-MCNC: 0.64 MG/DL — SIGNIFICANT CHANGE UP (ref 0.5–1.3)
CREAT SERPL-MCNC: 0.7 MG/DL — SIGNIFICANT CHANGE UP (ref 0.5–1.3)
EGFR: 108 ML/MIN/1.73M2 — SIGNIFICANT CHANGE UP
EGFR: 110 ML/MIN/1.73M2 — SIGNIFICANT CHANGE UP
EGFR: 113 ML/MIN/1.73M2 — SIGNIFICANT CHANGE UP
GLUCOSE BLDC GLUCOMTR-MCNC: 115 MG/DL — HIGH (ref 70–99)
GLUCOSE BLDC GLUCOMTR-MCNC: 125 MG/DL — HIGH (ref 70–99)
GLUCOSE BLDC GLUCOMTR-MCNC: 131 MG/DL — HIGH (ref 70–99)
GLUCOSE BLDC GLUCOMTR-MCNC: 155 MG/DL — HIGH (ref 70–99)
GLUCOSE SERPL-MCNC: 162 MG/DL — HIGH (ref 70–99)
GLUCOSE SERPL-MCNC: 171 MG/DL — HIGH (ref 70–99)
GLUCOSE SERPL-MCNC: 184 MG/DL — HIGH (ref 70–99)
OSMOLALITY SERPL: 297 MOSMOL/KG — SIGNIFICANT CHANGE UP (ref 275–300)
OSMOLALITY SERPL: 303 MOSMOL/KG — HIGH (ref 275–300)
OSMOLALITY SERPL: 307 MOSMOL/KG — HIGH (ref 275–300)
OSMOLALITY UR: 134 MOS/KG — LOW (ref 300–900)
OSMOLALITY UR: 165 MOS/KG — LOW (ref 300–900)
OSMOLALITY UR: 207 MOS/KG — LOW (ref 300–900)
POTASSIUM SERPL-MCNC: 4 MMOL/L — SIGNIFICANT CHANGE UP (ref 3.5–5.3)
POTASSIUM SERPL-MCNC: 4.2 MMOL/L — SIGNIFICANT CHANGE UP (ref 3.5–5.3)
POTASSIUM SERPL-MCNC: 4.5 MMOL/L — SIGNIFICANT CHANGE UP (ref 3.5–5.3)
POTASSIUM SERPL-SCNC: 4 MMOL/L — SIGNIFICANT CHANGE UP (ref 3.5–5.3)
POTASSIUM SERPL-SCNC: 4.2 MMOL/L — SIGNIFICANT CHANGE UP (ref 3.5–5.3)
POTASSIUM SERPL-SCNC: 4.5 MMOL/L — SIGNIFICANT CHANGE UP (ref 3.5–5.3)
SODIUM SERPL-SCNC: 141 MMOL/L — SIGNIFICANT CHANGE UP (ref 135–145)
SODIUM SERPL-SCNC: 144 MMOL/L — SIGNIFICANT CHANGE UP (ref 135–145)
SODIUM SERPL-SCNC: 144 MMOL/L — SIGNIFICANT CHANGE UP (ref 135–145)
SODIUM UR-SCNC: 19 MMOL/L — SIGNIFICANT CHANGE UP
SODIUM UR-SCNC: 20 MMOL/L — SIGNIFICANT CHANGE UP
SODIUM UR-SCNC: 24 MMOL/L — SIGNIFICANT CHANGE UP
SP GR UR STRIP: 1 — SIGNIFICANT CHANGE UP (ref 1–1.03)
SP GR UR STRIP: 1.01 — SIGNIFICANT CHANGE UP (ref 1–1.03)
SP GR UR STRIP: <1.005 — LOW (ref 1–1.03)
T4 FREE SERPL-MCNC: 1.4 NG/DL — SIGNIFICANT CHANGE UP (ref 0.9–1.8)
TSH SERPL-MCNC: 1.04 UIU/ML — SIGNIFICANT CHANGE UP (ref 0.27–4.2)

## 2024-07-13 PROCEDURE — 99233 SBSQ HOSP IP/OBS HIGH 50: CPT

## 2024-07-13 PROCEDURE — 99231 SBSQ HOSP IP/OBS SF/LOW 25: CPT

## 2024-07-13 PROCEDURE — 93970 EXTREMITY STUDY: CPT | Mod: 26

## 2024-07-13 RX ORDER — DEXAMETHASONE 1 MG/1
TABLET ORAL
Refills: 0 | Status: DISCONTINUED | OUTPATIENT
Start: 2024-07-13 | End: 2024-07-17

## 2024-07-13 RX ORDER — DEXAMETHASONE 1 MG/1
4 TABLET ORAL EVERY 12 HOURS
Refills: 0 | Status: COMPLETED | OUTPATIENT
Start: 2024-07-14 | End: 2024-07-16

## 2024-07-13 RX ORDER — DEXAMETHASONE 1 MG/1
4 TABLET ORAL EVERY 8 HOURS
Refills: 0 | Status: COMPLETED | OUTPATIENT
Start: 2024-07-13 | End: 2024-07-14

## 2024-07-13 RX ORDER — DESMOPRESSIN ACETATE 0.1 MG/1
0.05 TABLET ORAL ONCE
Refills: 0 | Status: COMPLETED | OUTPATIENT
Start: 2024-07-13 | End: 2024-07-13

## 2024-07-13 RX ORDER — DEXAMETHASONE 1 MG/1
4 TABLET ORAL EVERY 8 HOURS
Refills: 0 | Status: DISCONTINUED | OUTPATIENT
Start: 2024-07-13 | End: 2024-07-13

## 2024-07-13 RX ORDER — ACETAMINOPHEN 325 MG
1000 TABLET ORAL ONCE
Refills: 0 | Status: COMPLETED | OUTPATIENT
Start: 2024-07-13 | End: 2024-07-13

## 2024-07-13 RX ORDER — DEXAMETHASONE 1 MG/1
2 TABLET ORAL EVERY 12 HOURS
Refills: 0 | Status: DISCONTINUED | OUTPATIENT
Start: 2024-07-16 | End: 2024-07-17

## 2024-07-13 RX ORDER — DESMOPRESSIN ACETATE 0.1 MG/1
0.5 TABLET ORAL ONCE
Refills: 0 | Status: DISCONTINUED | OUTPATIENT
Start: 2024-07-13 | End: 2024-07-13

## 2024-07-13 RX ORDER — MAGNESIUM CITRATE 1.75 G/29.6ML
296 LIQUID ORAL ONCE
Refills: 0 | Status: COMPLETED | OUTPATIENT
Start: 2024-07-13 | End: 2024-07-13

## 2024-07-13 RX ORDER — DEXAMETHASONE 1 MG/1
2 TABLET ORAL DAILY
Refills: 0 | Status: DISCONTINUED | OUTPATIENT
Start: 2024-07-18 | End: 2024-07-17

## 2024-07-13 RX ADMIN — Medication 1000 MILLIGRAM(S): at 15:25

## 2024-07-13 RX ADMIN — POLYETHYLENE GLYCOL 3350 17 GRAM(S): 1 POWDER ORAL at 17:09

## 2024-07-13 RX ADMIN — POLYETHYLENE GLYCOL 3350 17 GRAM(S): 1 POWDER ORAL at 05:56

## 2024-07-13 RX ADMIN — Medication 1000 MILLIGRAM(S): at 04:59

## 2024-07-13 RX ADMIN — OXYCODONE HYDROCHLORIDE 5 MILLIGRAM(S): 100 SOLUTION ORAL at 00:04

## 2024-07-13 RX ADMIN — ATORVASTATIN CALCIUM 20 MILLIGRAM(S): 20 TABLET, FILM COATED ORAL at 21:14

## 2024-07-13 RX ADMIN — DEXAMETHASONE 4 MILLIGRAM(S): 1 TABLET ORAL at 13:00

## 2024-07-13 RX ADMIN — Medication 1000 MILLIGRAM(S): at 20:49

## 2024-07-13 RX ADMIN — Medication 400 MILLIGRAM(S): at 14:25

## 2024-07-13 RX ADMIN — Medication 400 MILLIGRAM(S): at 04:29

## 2024-07-13 RX ADMIN — DESMOPRESSIN ACETATE 0.05 MILLIGRAM(S): 0.1 TABLET ORAL at 20:18

## 2024-07-13 RX ADMIN — Medication 20 MILLIGRAM(S): at 05:56

## 2024-07-13 RX ADMIN — MAGNESIUM CITRATE 296 MILLILITER(S): 1.75 LIQUID ORAL at 12:01

## 2024-07-13 RX ADMIN — DEXAMETHASONE 4 MILLIGRAM(S): 1 TABLET ORAL at 05:56

## 2024-07-13 RX ADMIN — OXYCODONE HYDROCHLORIDE 5 MILLIGRAM(S): 100 SOLUTION ORAL at 08:48

## 2024-07-13 RX ADMIN — DESMOPRESSIN ACETATE 0.05 MILLIGRAM(S): 0.1 TABLET ORAL at 12:01

## 2024-07-13 RX ADMIN — OXYCODONE HYDROCHLORIDE 5 MILLIGRAM(S): 100 SOLUTION ORAL at 09:48

## 2024-07-13 RX ADMIN — Medication 650 MILLIGRAM(S): at 07:32

## 2024-07-13 RX ADMIN — Medication 20 MILLIGRAM(S): at 17:09

## 2024-07-13 RX ADMIN — Medication 1 APPLICATION(S): at 21:14

## 2024-07-13 RX ADMIN — Medication 650 MILLIGRAM(S): at 08:00

## 2024-07-13 RX ADMIN — DEXAMETHASONE 4 MILLIGRAM(S): 1 TABLET ORAL at 21:14

## 2024-07-13 RX ADMIN — Medication 400 MILLIGRAM(S): at 20:19

## 2024-07-13 RX ADMIN — INSULIN LISPRO 2: 100 INJECTION, SOLUTION SUBCUTANEOUS at 11:10

## 2024-07-13 NOTE — PROGRESS NOTE ADULT - SUBJECTIVE AND OBJECTIVE BOX
ENT ISSUE/POD: 44y POD #2 from TSRP with high flow leak, repaired with duragen inlay, duraguard button, L nasoseptal flap.     HPI: 43 y/o F, POD #2 from TSRP with high flow leak, repaired with duragen inlay, duraguard button, L nasoseptal flap. Pt seen and examined at bedside, c/o moderate HA that is relieved with pain meds. Pt reports that left vision is improving slightly. Throughout the night, endorsed intermittent PND but denied metallic or salty taste. Currently denies PND, salty/metallic taste, or abnormal nasal discharge.         PAST MEDICAL & SURGICAL HISTORY:  Hyperlipidemia      H/O: hysterectomy        Allergies    No Known Allergies    Intolerances      MEDICATIONS  (STANDING):  atorvastatin 20 milliGRAM(s) Oral at bedtime  bisacodyl 5 milliGRAM(s) Oral at bedtime  chlorhexidine 4% Liquid 1 Application(s) Topical <User Schedule>  dexAMETHasone     Tablet 4 milliGRAM(s) Oral every 6 hours  enoxaparin Injectable 40 milliGRAM(s) SubCutaneous <User Schedule>  famotidine    Tablet 20 milliGRAM(s) Oral every 12 hours  insulin lispro (ADMELOG) corrective regimen sliding scale   SubCutaneous Before meals and at bedtime  polyethylene glycol 3350 17 Gram(s) Oral two times a day  senna 2 Tablet(s) Oral at bedtime    MEDICATIONS  (PRN):  acetaminophen     Tablet .. 650 milliGRAM(s) Oral every 6 hours PRN Mild Pain (1 - 3)  bisacodyl 5 milliGRAM(s) Oral daily PRN Constipation  ondansetron Injectable 4 milliGRAM(s) IV Push every 6 hours PRN Nausea and/or Vomiting  oxyCODONE    IR 5 milliGRAM(s) Oral every 4 hours PRN Moderate Pain (4 - 6)      Social History: see consult    Family history: see consult    ROS:   ENT: all negative except as noted in HPI   Pulm: denies SOB, cough, hemoptysis  Neuro: denies numbness/tingling, loss of sensation  Endo: denies heat/cold intolerance, excessive sweating      Vital Signs Last 24 Hrs  T(C): 36.6 (13 Jul 2024 07:00), Max: 36.9 (12 Jul 2024 11:00)  T(F): 97.9 (13 Jul 2024 07:00), Max: 98.4 (12 Jul 2024 11:00)  HR: 69 (13 Jul 2024 07:00) (61 - 81)  BP: --  BP(mean): --  RR: 14 (13 Jul 2024 07:00) (12 - 20)  SpO2: 96% (13 Jul 2024 07:00) (94% - 97%)    Parameters below as of 12 Jul 2024 09:45  Patient On (Oxygen Delivery Method): room air                              13.6   19.18 )-----------( 275      ( 11 Jul 2024 21:17 )             39.6    07-13    141  |  109<H>  |  13  ----------------------------<  171<H>  4.2   |  21<L>  |  0.64    Ca    8.8      13 Jul 2024 04:58       PHYSICAL EXAM:  Gen: NAD  Skin: No rashes, bruises, or lesions  Head: Normocephalic, Atraumatic  Face: no edema, erythema, or fluctuance. Parotid glands soft without mass  Eyes: no scleral injection  Nose: Nares bilaterally with dissolvable packings, no discharge  Mouth: No Stridor / Drooling / Trismus.  Mucosa moist, tongue/uvula midline, oropharynx clear  Neck: Flat, supple, no lymphadenopathy, trachea midline, no masses  Lymphatic: No lymphadenopathy  Resp: breathing easily, no stridor  Neuro: facial nerve intact, no facial droop

## 2024-07-13 NOTE — PROGRESS NOTE ADULT - ASSESSMENT
43 y/o F, POD#2 from TSRP with high flow leak, repaired with duragen inlay, duraguard button, L nasoseptal flap. Pt seen and examined at bedside, c/o moderate HA that is relieved with pain meds. Pt reports that left vision is improving slightly. Throughout the night, endorsed intermittent PND but denied metallic or salty taste. On exam, no evidence of active epistaxis or CSF leak b/l.

## 2024-07-13 NOTE — PROGRESS NOTE ADULT - ASSESSMENT
MR done f/u read   if MR ok, for dvt ppx   Dex taper over 4-5d  DI watch (stalk manipulation)   AM cortisol x3 days  BMP q 8hr   Skullbase precautions   HOB 30

## 2024-07-13 NOTE — PROGRESS NOTE ADULT - ASSESSMENT
44F hx of thyroid goiter, HLD presenting for progressive HAs/L eye vision deficit. 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 Endocrine consulted for: Sellar mass, possibly pituitary adenoma    #Sellar mass, possibly pituitary adenoma  - 1.9cm sellar mass with L optic nerve compression with visual deficits with associated elevation in prolactin 153, diluted prolactin 108. Mildly elevated TSH 6.40, FT4 1.0, 2am cortisol 4.0, estradiol 36, LH 7.4, FSH 9.4, . TPO Ab <10  - Suspect that this is likely non-functioning pituitary adenoma given most pituitary hormone levels without significant abnormalities.  - s/p resection 7/11, denies any symptoms post-op  - post op TFTs wnl (7/13/24: TSH 1.0, FT4 1.4), unable to assess AM cortisol since patient was started on dexamethasone 4mg IV q6h since 5am 7/4    - 7/13/24: Urine output increased to 400cc/hr w/ low urine osm/spec gravity with Na 144 - s/p DDAVP 0.05mg at 12pm    Recommendations  - recommend another dose of DDAVP 0.05mg x1 STAT given increased UOP w/ low urine osm/spec gravity with Na 144  - Patient on Decadron 4 mg q6. Unable to assess HPA axis while on steroids  - may repeat other pituitary labs when patient as OP: IGF-1, LH, FSH, prolactin    *DI WATCH*:  - Goal Na 140-145  - Please monitor strict I/O, sodium levels q12h, and urine osm q12hrs  - DI is suspected if UOP is >250cc/hr x 2 consecutive hours or if >500cc/hr x 1 hr - if this occurs please check STAT serum Na, urine osm, urine specific gravity  - if specific gravity <1.005/Urine Osm <300 and Na is rising - likely DI, please dose DDAVP 0.05mg PO x1 (or DDAVP 0.5mcg IV if no PO access) and bolus 1/2 NS to match half the output (for example, if net negative 2Liters can give 1Liter 1/2NS if pt is unable to keep up with output with PO intake) - continue DI watch  - If Na continues to increase despite a a DDAVP dose and 1/2NS fluid bolus please inform endocrine   - Please make sure pt has access to water and encourage her to drink to thirst     #Thyroid goiter  She has thyroid goiter for which she follows up with Endocrinologist Dr. Espino ([facility]) since 2021. Only been monitoring with US.   - Follow up outpatient with Dr. Espino    #HLD  - , from 96 in June  - c/w atorva 20 for now      Discussed recommendations with primary team.    Benson Lee MD  Endocrine Fellow  Can be reached via [employer] teams.    For follow up questions, discharge recommendations, or new consults, please email [email or web address] or [email or web address] ([email or web address]) or call answering service at [phone] (weekdays); [phone] (nights/weekends).  For emergencies please page fellow on call.

## 2024-07-13 NOTE — PROGRESS NOTE ADULT - SUBJECTIVE AND OBJECTIVE BOX
Patient seen and examined at bedside.    --Anticoagulation--  enoxaparin Injectable 40 milliGRAM(s) SubCutaneous <User Schedule>    T(C): 36.8 (07-12-24 @ 15:28), Max: 36.9 (07-12-24 @ 07:00)  HR: 81 (07-12-24 @ 19:00) (61 - 86)  BP: --  RR: 14 (07-12-24 @ 19:00) (10 - 20)  SpO2: 95% (07-12-24 @ 19:00) (95% - 97%)  Wt(kg): --    Exam: AOx3, PERRL, EOMI, left eye Lt field cut improved from preop but can finger count, Rt eye intact, no facial, no drift, BHAGAT 5/5, SILT

## 2024-07-13 NOTE — PROGRESS NOTE ADULT - SUBJECTIVE AND OBJECTIVE BOX
NSICU NIGHT PROGRESS NOTE     Please see neurosurgery H&P for HPI.     12h events   increased urine output >300/h, high serum osm, serum Na stable, normal spec grav, gave 1 time ddavp 0.05 po      Exam:   HEENT: neck supple    Neurology: Alert, awake, oriented on self, place and year, speech fluent, follows simple commands, PERRL, EOMI, OS sees shadows, VFF to finger counting, gaze midline, face symmetric   UE/LE 5/5  Cardiovascular: S1, S2, Regular rate and rhythm   Pulmonary: Clear to Auscultation, No rales, No rhonchi, No wheezes   Gastrointestinal: Soft, Non-tender, Non-distended   Skin: warm and dry     VITALS:   Vital Signs Last 24 Hrs  T(C): 36.6 (13 Jul 2024 19:00), Max: 37.2 (13 Jul 2024 11:00)  T(F): 97.9 (13 Jul 2024 19:00), Max: 99 (13 Jul 2024 11:00)  HR: 66 (13 Jul 2024 19:00) (64 - 76)  BP: --  BP(mean): --  RR: 13 (13 Jul 2024 19:00) (13 - 14)  SpO2: 96% (13 Jul 2024 19:00) (94% - 96%)    Parameters below as of 13 Jul 2024 19:00  Patient On (Oxygen Delivery Method): room air        Drips     Respiratory:        LABS:    07-13    144  |  105  |  15  ----------------------------<  162<H>  4.5   |  27  |  0.70      CAPILLARY BLOOD GLUCOSE      POCT Blood Glucose.: 115 mg/dL (13 Jul 2024 21:11)  POCT Blood Glucose.: 125 mg/dL (13 Jul 2024 16:34)  POCT Blood Glucose.: 155 mg/dL (13 Jul 2024 10:57)  POCT Blood Glucose.: 131 mg/dL (13 Jul 2024 07:51)      I&O's Summary    12 Jul 2024 07:01  -  13 Jul 2024 07:00  --------------------------------------------------------  IN: 3035 mL / OUT: 3330 mL / NET: -295 mL    13 Jul 2024 07:01  -  13 Jul 2024 21:54  --------------------------------------------------------  IN: 2500 mL / OUT: 4100 mL / NET: -1600 mL        Imaging   CXR     EKG     MEDICATION LEVELS:     IVF FLUIDS/MEDICATIONS:   MEDICATIONS  (STANDING):  atorvastatin 20 milliGRAM(s) Oral at bedtime  bisacodyl 5 milliGRAM(s) Oral at bedtime  chlorhexidine 4% Liquid 1 Application(s) Topical <User Schedule>  dexAMETHasone     Tablet   Oral   dexAMETHasone     Tablet 4 milliGRAM(s) Oral every 8 hours  enoxaparin Injectable 40 milliGRAM(s) SubCutaneous <User Schedule>  famotidine    Tablet 20 milliGRAM(s) Oral every 12 hours  insulin lispro (ADMELOG) corrective regimen sliding scale   SubCutaneous Before meals and at bedtime  polyethylene glycol 3350 17 Gram(s) Oral two times a day  senna 2 Tablet(s) Oral at bedtime    MEDICATIONS  (PRN):  bisacodyl 5 milliGRAM(s) Oral daily PRN Constipation  ondansetron Injectable 4 milliGRAM(s) IV Push every 6 hours PRN Nausea and/or Vomiting  oxyCODONE    IR 5 milliGRAM(s) Oral every 4 hours PRN Moderate Pain (4 - 6)

## 2024-07-13 NOTE — PROGRESS NOTE ADULT - SUBJECTIVE AND OBJECTIVE BOX
SUMMARY:44F Hebrew speaking ([facility] xfer) p/f progressive HAs/L eye blurry vision/short term mem loss x1mo, w/ acutely worse Lt side HA x2d. At 11AM yesterday suddenly could only see figures/shadows from Lt eye. Optho noted Lt optic nn. dysfxn/no papilledema. 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 Exam: Wide awake, Ox3, pupils dilated by optho, VFF to finger counting b/l but Lt eye can only see shadows/figure outlines, no drift o/w intact  (04 Jul 2024 02:28)    OVERNIGHT EVENTS: There were no overnight events noted    Hospital course:   7/12:  POD 1 of TSP resection of pituitary adenoma      ADMISSION SCORES:   GCS: HH: MF: NIHSS: ICH Score:    SEDATION SCORES:  RASS: CAM-ICU:     REVIEW OF SYSTEMS:    VITALS: [x] Reviewed  ICU Vital Signs Last 24 Hrs  T(C): 36.6 (13 Jul 2024 07:00), Max: 36.9 (12 Jul 2024 11:00)  T(F): 97.9 (13 Jul 2024 07:00), Max: 98.4 (12 Jul 2024 11:00)  HR: 69 (13 Jul 2024 07:00) (61 - 81)  BP: --  BP(mean): --  ABP: 122/78 (13 Jul 2024 07:00) (116/60 - 132/71)  ABP(mean): 94 (13 Jul 2024 07:00) (78 - 97)  RR: 14 (13 Jul 2024 07:00) (12 - 20)  SpO2: 96% (13 Jul 2024 07:00) (94% - 97%)    O2 Parameters below as of 12 Jul 2024 09:45  Patient On (Oxygen Delivery Method): room air        IMAGING/DATA: [x] Reviewed    IVF FLUIDS/MEDICATIONS: [x] Reviewed    ALLERGIES: Allergies    No Known Allergies    Intolerances    DEVICES:   [] Restraints [] PIVs [] ET tube [] central line [] PICC [] arterial line [] enrique [] NGT/OGT [] EVD [] LD [] DILCIA/HMV [] LiCOX [] ICP monitor [] Trach [] PEG [] Chest Tube [] other:    EXAMINATION:  General: No acute distress  HEENT: Anicteric sclerae  Cardiac: [id number]  Lungs: Clear  Abdomen: Soft, non-tender, +BS  Extremities: No c/c/e  Skin/Incision Site: Clean, dry and intact  Neurologic: Awake, alert, fully oriented, follows commands,   PERRL, VFFtc, EOMI, face symmetric, tongue midline,   full strength x4   Sensation intact x4   Coordination FTN x2  What Type Of Note Output Would You Prefer (Optional)?: Bullet Format How Severe Is Your Rash?: mild Is This A New Presentation, Or A Follow-Up?: Rash

## 2024-07-13 NOTE — PROGRESS NOTE ADULT - SUBJECTIVE AND OBJECTIVE BOX
Benson Lee MD, PGY-5  Endocrinology fellow  Available on [employer] Teams    Interval Events: No acute overnight events. Pt seen and examined. Has polyuria and polydipsia, improved with DDAVP Tolerating PO, no nausea/vomiting. No hypoglycemia symptoms. Denies fevers, chills, CP, SOB, Abdominal pain, constipation, Diarrhea.      MEDICATIONS  (STANDING):  atorvastatin 20 milliGRAM(s) Oral at bedtime  bisacodyl 5 milliGRAM(s) Oral at bedtime  chlorhexidine 4% Liquid 1 Application(s) Topical <User Schedule>  dexAMETHasone     Tablet   Oral   dexAMETHasone     Tablet 4 milliGRAM(s) Oral every 8 hours  enoxaparin Injectable 40 milliGRAM(s) SubCutaneous <User Schedule>  famotidine    Tablet 20 milliGRAM(s) Oral every 12 hours  insulin lispro (ADMELOG) corrective regimen sliding scale   SubCutaneous Before meals and at bedtime  polyethylene glycol 3350 17 Gram(s) Oral two times a day  senna 2 Tablet(s) Oral at bedtime    MEDICATIONS  (PRN):  bisacodyl 5 milliGRAM(s) Oral daily PRN Constipation  ondansetron Injectable 4 milliGRAM(s) IV Push every 6 hours PRN Nausea and/or Vomiting  oxyCODONE    IR 5 milliGRAM(s) Oral every 4 hours PRN Moderate Pain (4 - 6)      Allergies    No Known Allergies    Intolerances          ================PHYSICAL EXAM======================  VITALS: T(C): 36.9 (07-13-24 @ 15:00)  T(F): 98.4 (07-13-24 @ 15:00), Max: 99 (07-13-24 @ 11:00)  HR: 64 (07-13-24 @ 15:00) (64 - 76)  BP: --  RR:  (13 - 14)  SpO2:  (94% - 96%)  Wt(kg): --  GENERAL: NAD, appears comfortable  EYES: No proptosis, no lid lag, anicteric  HEENT:  Atraumatic, Normocephalic, moist mucous membranes  RESPIRATORY: nonlabored respirations, no wheezing  PSYCH: Alert and awake  ===================================================    CAPILLARY BLOOD GLUCOSE      POCT Blood Glucose.: 125 mg/dL (13 Jul 2024 16:34)  POCT Blood Glucose.: 155 mg/dL (13 Jul 2024 10:57)  POCT Blood Glucose.: 131 mg/dL (13 Jul 2024 07:51)      07-13    144  |  105  |  15  ----------------------------<  162<H>  4.5   |  27  |  0.70    eGFR: 108    Ca    9.8      07-13            Thyroid Function Tests:  07-13 @ 04:58 TSH 1.04 FreeT4 1.4 T3 -- Anti TPO -- Anti Thyroglobulin Ab -- TSI --  07-05 @ 06:49 TSH -- FreeT4 1.0 T3 -- Anti TPO <10.0 Anti Thyroglobulin Ab -- TSI --

## 2024-07-13 NOTE — PROGRESS NOTE ADULT - ASSESSMENT
ASSESSMENT: TSP resection of pituitary adenoma, POD2    Neuro   #07/11/24 s/p expanded endonasal approach for non functioning pituitary adenoma with 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 with nasoseptal flap repair  MRI wwo post-op   wean dexamethasone   Skull base precautions/monitor CSF leakage    #Pain: Tylenol and oxycodone  #Activity: [x] OOB as tolerated [] Bedrest [x] PT [x] OT [] PMNR    CV   #-160mmHg    Pulm  #Pituitary precautions (no incentive spirometry, no straws, no BIPAP)  Keep RA   Keep SPo2 Greater than 92%    R   #Strict I+Os  IVL     GI   #Diet: Dysphagia screen and then advance diet as tolerated  #Senna miralax  Last BM PTA     ID   afebrile        ENDO:   #Goal euglycemia (-180)  #Pituitary labs: Cortisol AM x 3d   TSH, FSH, LH, Prolactin, T4 - pending  Endocrine ordered STAT- TSH, FT4      #Monitor for DI - improved   Na level  improved  BMP q8h Osm, Ulytes spec gravity   #HLD continue atorvastatin     H  #DVT ppx:   SCDs for now   LED 7/4 no DVT  Dopplers pending     #CODE STATUS: FULL CODE    #DISPOSITION: ICU  ASSESSMENT: TSP resection of pituitary adenoma, POD2    Neuro   #07/11/24 s/p expanded endonasal approach for non functioning pituitary adenoma with 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 with nasoseptal flap repair  MRI wwo post-op   wean dexamethasone   Skull base precautions/monitor CSF leakage    #Pain: Tylenol and oxycodone  #Activity: [x] OOB as tolerated [] Bedrest [x] PT [x] OT [] PMNR    CV   #-160mmHg    Pulm  #Pituitary precautions (no incentive spirometry, no straws, no BIPAP)  Keep RA   Keep SPo2 Greater than 92%    Renal   #DI   #Strict I+Os  IVL   Na level increased.  UO increased   Will add 0.5mg DDAVP and reasses     GI   #Diet: CCD diet   #Senna miralax  Will add mag citrate   Last BM PTA     ID   afebrile      ENDO:   #Goal euglycemia (-180)  #Pituitary labs: Cortisol AM x 3d   TSH, FSH, LH, Prolactin, T4 - pending  Endocrine ordered STAT- TSH, FT4      #Monitor for DI -  Na level  improved  BMP q8h Osm, Ulytes spec gravity   will add 0.5mg DDAVP x1 to assess   #HLD continue atorvastatin     H  #DVT ppx:   SCDs for now   LED 7/4 no DVT  Dopplers pending     #CODE STATUS: FULL CODE    #DISPOSITION: ICU

## 2024-07-13 NOTE — PROGRESS NOTE ADULT - ASSESSMENT
ASSESSMENT: TSP resection of pituitary adenoma, POD 2    N   #07/11/24 s/p expanded endonasal approach for non functioning pituitary adenoma with 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 with nasoseptal flap repair  MRI wwo post-op   wean dexamethasone   #CSF leak  Skull base precautions/monitor CSF leakage    #Pain: Tylenol and oxycodone  #Activity: OOB     CV   #-160mmHg    P   #Pituitary precautions (no incentive spirometry, no straws, no BIPAP)    R   #Strict I+Os  Drink to thirst     GI   #Diet: regular  #Senna miralax  Last BM 7/13    ID   Afebrile     ENDO:   #Goal euglycemia (-180)  ISS while on dexamethasone   #Pituitary labs: Cortisol AM x 3d  TSH, FSH, LH, Prolactin, T4, in AM POD1   #Monitor for DI, BMP q8h Osm, Ulytes spec gravity   #HLD continue atorvastatin     H  #DVT ppx:   SCDs   Chemoprophylaxis   LED 7/4 no DVT     #CODE STATUS: FULL CODE    #DISPOSITION: ICU

## 2024-07-14 LAB
ANION GAP SERPL CALC-SCNC: 11 MMOL/L — SIGNIFICANT CHANGE UP (ref 5–17)
ANION GAP SERPL CALC-SCNC: 12 MMOL/L — SIGNIFICANT CHANGE UP (ref 5–17)
ANION GAP SERPL CALC-SCNC: 14 MMOL/L — SIGNIFICANT CHANGE UP (ref 5–17)
ANION GAP SERPL CALC-SCNC: 14 MMOL/L — SIGNIFICANT CHANGE UP (ref 5–17)
BUN SERPL-MCNC: 16 MG/DL — SIGNIFICANT CHANGE UP (ref 7–23)
BUN SERPL-MCNC: 18 MG/DL — SIGNIFICANT CHANGE UP (ref 7–23)
BUN SERPL-MCNC: 19 MG/DL — SIGNIFICANT CHANGE UP (ref 7–23)
BUN SERPL-MCNC: 21 MG/DL — SIGNIFICANT CHANGE UP (ref 7–23)
CALCIUM SERPL-MCNC: 8.9 MG/DL — SIGNIFICANT CHANGE UP (ref 8.4–10.5)
CALCIUM SERPL-MCNC: 9 MG/DL — SIGNIFICANT CHANGE UP (ref 8.4–10.5)
CALCIUM SERPL-MCNC: 9.2 MG/DL — SIGNIFICANT CHANGE UP (ref 8.4–10.5)
CALCIUM SERPL-MCNC: 9.3 MG/DL — SIGNIFICANT CHANGE UP (ref 8.4–10.5)
CHLORIDE SERPL-SCNC: 104 MMOL/L — SIGNIFICANT CHANGE UP (ref 96–108)
CHLORIDE SERPL-SCNC: 106 MMOL/L — SIGNIFICANT CHANGE UP (ref 96–108)
CHLORIDE SERPL-SCNC: 107 MMOL/L — SIGNIFICANT CHANGE UP (ref 96–108)
CHLORIDE SERPL-SCNC: 107 MMOL/L — SIGNIFICANT CHANGE UP (ref 96–108)
CO2 SERPL-SCNC: 18 MMOL/L — LOW (ref 22–31)
CO2 SERPL-SCNC: 19 MMOL/L — LOW (ref 22–31)
CO2 SERPL-SCNC: 21 MMOL/L — LOW (ref 22–31)
CO2 SERPL-SCNC: 24 MMOL/L — SIGNIFICANT CHANGE UP (ref 22–31)
CREAT SERPL-MCNC: 0.51 MG/DL — SIGNIFICANT CHANGE UP (ref 0.5–1.3)
CREAT SERPL-MCNC: 0.54 MG/DL — SIGNIFICANT CHANGE UP (ref 0.5–1.3)
CREAT SERPL-MCNC: 0.59 MG/DL — SIGNIFICANT CHANGE UP (ref 0.5–1.3)
CREAT SERPL-MCNC: 0.65 MG/DL — SIGNIFICANT CHANGE UP (ref 0.5–1.3)
EGFR: 110 ML/MIN/1.73M2 — SIGNIFICANT CHANGE UP
EGFR: 112 ML/MIN/1.73M2 — SIGNIFICANT CHANGE UP
EGFR: 115 ML/MIN/1.73M2 — SIGNIFICANT CHANGE UP
EGFR: 117 ML/MIN/1.73M2 — SIGNIFICANT CHANGE UP
GLUCOSE BLDC GLUCOMTR-MCNC: 115 MG/DL — HIGH (ref 70–99)
GLUCOSE BLDC GLUCOMTR-MCNC: 121 MG/DL — HIGH (ref 70–99)
GLUCOSE BLDC GLUCOMTR-MCNC: 131 MG/DL — HIGH (ref 70–99)
GLUCOSE BLDC GLUCOMTR-MCNC: 220 MG/DL — HIGH (ref 70–99)
GLUCOSE SERPL-MCNC: 133 MG/DL — HIGH (ref 70–99)
GLUCOSE SERPL-MCNC: 137 MG/DL — HIGH (ref 70–99)
GLUCOSE SERPL-MCNC: 206 MG/DL — HIGH (ref 70–99)
GLUCOSE SERPL-MCNC: 239 MG/DL — HIGH (ref 70–99)
OSMOLALITY SERPL: 293 MOSMOL/KG — SIGNIFICANT CHANGE UP (ref 275–300)
OSMOLALITY SERPL: 302 MOSMOL/KG — HIGH (ref 275–300)
OSMOLALITY SERPL: 302 MOSMOL/KG — HIGH (ref 275–300)
OSMOLALITY UR: 218 MOS/KG — LOW (ref 300–900)
OSMOLALITY UR: 278 MOS/KG — LOW (ref 300–900)
POTASSIUM SERPL-MCNC: 3.9 MMOL/L — SIGNIFICANT CHANGE UP (ref 3.5–5.3)
POTASSIUM SERPL-MCNC: 5.1 MMOL/L — SIGNIFICANT CHANGE UP (ref 3.5–5.3)
POTASSIUM SERPL-MCNC: 5.4 MMOL/L — HIGH (ref 3.5–5.3)
POTASSIUM SERPL-MCNC: 5.8 MMOL/L — HIGH (ref 3.5–5.3)
POTASSIUM SERPL-SCNC: 3.9 MMOL/L — SIGNIFICANT CHANGE UP (ref 3.5–5.3)
POTASSIUM SERPL-SCNC: 5.1 MMOL/L — SIGNIFICANT CHANGE UP (ref 3.5–5.3)
POTASSIUM SERPL-SCNC: 5.4 MMOL/L — HIGH (ref 3.5–5.3)
POTASSIUM SERPL-SCNC: 5.8 MMOL/L — HIGH (ref 3.5–5.3)
SODIUM SERPL-SCNC: 136 MMOL/L — SIGNIFICANT CHANGE UP (ref 135–145)
SODIUM SERPL-SCNC: 139 MMOL/L — SIGNIFICANT CHANGE UP (ref 135–145)
SODIUM SERPL-SCNC: 139 MMOL/L — SIGNIFICANT CHANGE UP (ref 135–145)
SODIUM SERPL-SCNC: 143 MMOL/L — SIGNIFICANT CHANGE UP (ref 135–145)
SP GR UR STRIP: 1.01 — SIGNIFICANT CHANGE UP (ref 1–1.03)
SP GR UR STRIP: 1.01 — SIGNIFICANT CHANGE UP (ref 1–1.03)

## 2024-07-14 PROCEDURE — 99232 SBSQ HOSP IP/OBS MODERATE 35: CPT | Mod: GC

## 2024-07-14 PROCEDURE — 99024 POSTOP FOLLOW-UP VISIT: CPT | Mod: GC

## 2024-07-14 RX ORDER — ACETAMINOPHEN 325 MG
1000 TABLET ORAL ONCE
Refills: 0 | Status: COMPLETED | OUTPATIENT
Start: 2024-07-14 | End: 2024-07-14

## 2024-07-14 RX ORDER — SODIUM CHLORIDE 0.65 %
2 AEROSOL, SPRAY (ML) NASAL
Refills: 0 | Status: DISCONTINUED | OUTPATIENT
Start: 2024-07-14 | End: 2024-07-17

## 2024-07-14 RX ORDER — TRAMADOL HYDROCHLORIDE 50 MG/1
50 TABLET, FILM COATED ORAL EVERY 4 HOURS
Refills: 0 | Status: DISCONTINUED | OUTPATIENT
Start: 2024-07-14 | End: 2024-07-14

## 2024-07-14 RX ORDER — TRAMADOL HYDROCHLORIDE 50 MG/1
25 TABLET, FILM COATED ORAL EVERY 4 HOURS
Refills: 0 | Status: DISCONTINUED | OUTPATIENT
Start: 2024-07-14 | End: 2024-07-14

## 2024-07-14 RX ORDER — ACETAMINOPHEN 325 MG
975 TABLET ORAL EVERY 6 HOURS
Refills: 0 | Status: DISCONTINUED | OUTPATIENT
Start: 2024-07-14 | End: 2024-07-17

## 2024-07-14 RX ORDER — POTASSIUM CHLORIDE 600 MG/1
20 TABLET, FILM COATED, EXTENDED RELEASE ORAL ONCE
Refills: 0 | Status: COMPLETED | OUTPATIENT
Start: 2024-07-14 | End: 2024-07-14

## 2024-07-14 RX ORDER — POTASSIUM CHLORIDE 600 MG/1
20 TABLET, FILM COATED, EXTENDED RELEASE ORAL ONCE
Refills: 0 | Status: DISCONTINUED | OUTPATIENT
Start: 2024-07-14 | End: 2024-07-14

## 2024-07-14 RX ORDER — ACETAMINOPHEN 325 MG
650 TABLET ORAL EVERY 6 HOURS
Refills: 0 | Status: DISCONTINUED | OUTPATIENT
Start: 2024-07-14 | End: 2024-07-14

## 2024-07-14 RX ORDER — POLYVINYL ALCOHOL, POVIDONE .5; .6 G/100ML; G/100ML
1 SOLUTION OPHTHALMIC
Refills: 0 | Status: DISCONTINUED | OUTPATIENT
Start: 2024-07-14 | End: 2024-07-17

## 2024-07-14 RX ADMIN — POLYVINYL ALCOHOL, POVIDONE 1 DROP(S): .5; .6 SOLUTION OPHTHALMIC at 22:07

## 2024-07-14 RX ADMIN — ATORVASTATIN CALCIUM 20 MILLIGRAM(S): 20 TABLET, FILM COATED ORAL at 22:07

## 2024-07-14 RX ADMIN — Medication 2 SPRAY(S): at 17:11

## 2024-07-14 RX ADMIN — Medication 20 MILLIGRAM(S): at 17:11

## 2024-07-14 RX ADMIN — Medication 1 APPLICATION(S): at 22:08

## 2024-07-14 RX ADMIN — Medication 1000 MILLIGRAM(S): at 03:45

## 2024-07-14 RX ADMIN — Medication 400 MILLIGRAM(S): at 03:30

## 2024-07-14 RX ADMIN — Medication 2 SPRAY(S): at 11:18

## 2024-07-14 RX ADMIN — POTASSIUM CHLORIDE 20 MILLIEQUIVALENT(S): 600 TABLET, FILM COATED, EXTENDED RELEASE ORAL at 17:29

## 2024-07-14 RX ADMIN — Medication 975 MILLIGRAM(S): at 11:00

## 2024-07-14 RX ADMIN — INSULIN LISPRO 4: 100 INJECTION, SOLUTION SUBCUTANEOUS at 22:12

## 2024-07-14 RX ADMIN — DEXAMETHASONE 4 MILLIGRAM(S): 1 TABLET ORAL at 17:12

## 2024-07-14 RX ADMIN — Medication 975 MILLIGRAM(S): at 12:45

## 2024-07-14 RX ADMIN — DEXAMETHASONE 4 MILLIGRAM(S): 1 TABLET ORAL at 06:07

## 2024-07-14 NOTE — PROGRESS NOTE ADULT - SUBJECTIVE AND OBJECTIVE BOX
NSICU DAY PROGRESS NOTE     Please see neurosurgery H&P for HPI.     12h events   overnight increased urine output >300/h, high serum osm, serum Na stable, normal spec grav, gave 1 time ddavp 0.05 po      Exam:   HEENT: neck supple    Neurology: Alert, awake, oriented on self, place and year, speech fluent, follows simple commands, PERRL, EOMI, OS sees shadows, VFF to finger counting, gaze midline, face symmetric   UE/LE 5/5  Cardiovascular: S1, S2, Regular rate and rhythm   Pulmonary: Clear to Auscultation, No rales, No rhonchi, No wheezes   Gastrointestinal: Soft, Non-tender, Non-distended   Skin: warm and dry   VITALS:   Vital Signs Last 24 Hrs  T(C): 36.7 (14 Jul 2024 03:00), Max: 37.2 (13 Jul 2024 11:00)  T(F): 98.1 (14 Jul 2024 03:00), Max: 99 (13 Jul 2024 11:00)  HR: 84 (14 Jul 2024 03:00) (64 - 84)  BP: --  BP(mean): --  RR: 16 (14 Jul 2024 03:00) (13 - 17)  SpO2: 96% (14 Jul 2024 03:00) (95% - 97%)    Parameters below as of 13 Jul 2024 19:00  Patient On (Oxygen Delivery Method): room air        Drips     Respiratory:        LABS:    07-14    136  |  104  |  16  ----------------------------<  133<H>  5.1   |  21<L>  |  0.59      CAPILLARY BLOOD GLUCOSE      POCT Blood Glucose.: 115 mg/dL (14 Jul 2024 06:06)  POCT Blood Glucose.: 115 mg/dL (13 Jul 2024 21:11)  POCT Blood Glucose.: 125 mg/dL (13 Jul 2024 16:34)  POCT Blood Glucose.: 155 mg/dL (13 Jul 2024 10:57)  POCT Blood Glucose.: 131 mg/dL (13 Jul 2024 07:51)      I&O's Summary    12 Jul 2024 07:01  -  13 Jul 2024 07:00  --------------------------------------------------------  IN: 3035 mL / OUT: 3330 mL / NET: -295 mL    13 Jul 2024 07:01  -  14 Jul 2024 06:43  --------------------------------------------------------  IN: 2820 mL / OUT: 5500 mL / NET: -2680 mL        Imaging   CXR     EKG     MEDICATION LEVELS:     IVF FLUIDS/MEDICATIONS:   MEDICATIONS  (STANDING):  atorvastatin 20 milliGRAM(s) Oral at bedtime  bisacodyl 5 milliGRAM(s) Oral at bedtime  chlorhexidine 4% Liquid 1 Application(s) Topical <User Schedule>  dexAMETHasone     Tablet   Oral   dexAMETHasone     Tablet 4 milliGRAM(s) Oral every 12 hours  enoxaparin Injectable 40 milliGRAM(s) SubCutaneous <User Schedule>  famotidine    Tablet 20 milliGRAM(s) Oral every 12 hours  insulin lispro (ADMELOG) corrective regimen sliding scale   SubCutaneous Before meals and at bedtime  polyethylene glycol 3350 17 Gram(s) Oral two times a day  senna 2 Tablet(s) Oral at bedtime    MEDICATIONS  (PRN):  bisacodyl 5 milliGRAM(s) Oral daily PRN Constipation  ondansetron Injectable 4 milliGRAM(s) IV Push every 6 hours PRN Nausea and/or Vomiting  oxyCODONE    IR 5 milliGRAM(s) Oral every 4 hours PRN Moderate Pain (4 - 6)       NSICU DAY PROGRESS NOTE     Please see neurosurgery H&P for HPI.     12h events  - overnight increased urine output >300/h, high serum osm, serum Na stable, normal spec grav, gave 1 time ddavp 0.05 po 8PM      Exam:   HEENT: neck supple    Neurology: Alert, awake, oriented on self, place and year, speech fluent, follows simple commands, PERRL, EOMI, OS sees shadows, VFF to finger counting, gaze midline, face symmetric   UE/LE 5/5  Cardiovascular: Regular rate and rhythm   Pulmonary: normal WOB, no accessory muscle use  Gastrointestinal: Soft, Non-tender, Non-distended, LBM 7/14   Skin: warm and dry, no edema      VITALS:   Vital Signs Last 24 Hrs  T(C): 36.7 (14 Jul 2024 03:00), Max: 37.2 (13 Jul 2024 11:00)  T(F): 98.1 (14 Jul 2024 03:00), Max: 99 (13 Jul 2024 11:00)  HR: 84 (14 Jul 2024 03:00) (64 - 84)  RR: 16 (14 Jul 2024 03:00) (13 - 17)  SpO2: 96% (14 Jul 2024 03:00) (95% - 97%)    Parameters below as of 13 Jul 2024 19:00  Patient On (Oxygen Delivery Method): room air    LABS:    07-14    136  |  104  |  16  ----------------------------<  133<H>  5.1   |  21<L>  |  0.59      CAPILLARY BLOOD GLUCOSE      POCT Blood Glucose.: 115 mg/dL (14 Jul 2024 06:06)  POCT Blood Glucose.: 115 mg/dL (13 Jul 2024 21:11)  POCT Blood Glucose.: 125 mg/dL (13 Jul 2024 16:34)  POCT Blood Glucose.: 155 mg/dL (13 Jul 2024 10:57)  POCT Blood Glucose.: 131 mg/dL (13 Jul 2024 07:51)      I&O's Summary    12 Jul 2024 07:01  -  13 Jul 2024 07:00  --------------------------------------------------------  IN: 3035 mL / OUT: 3330 mL / NET: -295 mL    13 Jul 2024 07:01  -  14 Jul 2024 06:43  --------------------------------------------------------  IN: 2820 mL / OUT: 5500 mL / NET: -2680 mL      Imaging   LE doppler - no DVT    MEDICATION LEVELS:     IVF FLUIDS/MEDICATIONS:   MEDICATIONS  (STANDING):  atorvastatin 20 milliGRAM(s) Oral at bedtime  bisacodyl 5 milliGRAM(s) Oral at bedtime  chlorhexidine 4% Liquid 1 Application(s) Topical <User Schedule>  dexAMETHasone     Tablet   Oral   dexAMETHasone     Tablet 4 milliGRAM(s) Oral every 12 hours  enoxaparin Injectable 40 milliGRAM(s) SubCutaneous <User Schedule>  famotidine    Tablet 20 milliGRAM(s) Oral every 12 hours  insulin lispro (ADMELOG) corrective regimen sliding scale   SubCutaneous Before meals and at bedtime  polyethylene glycol 3350 17 Gram(s) Oral two times a day  senna 2 Tablet(s) Oral at bedtime    MEDICATIONS  (PRN):  bisacodyl 5 milliGRAM(s) Oral daily PRN Constipation  ondansetron Injectable 4 milliGRAM(s) IV Push every 6 hours PRN Nausea and/or Vomiting  oxyCODONE    IR 5 milliGRAM(s) Oral every 4 hours PRN Moderate Pain (4 - 6)

## 2024-07-14 NOTE — PROGRESS NOTE ADULT - ASSESSMENT
44F hx of thyroid goiter, HLD presenting for progressive HAs/L eye vision deficit. 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 Endocrine consulted for: Sellar mass, possibly pituitary adenoma    #Sellar mass, possibly pituitary adenoma  - 1.9cm sellar mass with L optic nerve compression with visual deficits with associated elevation in prolactin 153, diluted prolactin 108. Mildly elevated TSH 6.40, FT4 1.0, 2am cortisol 4.0, estradiol 36, LH 7.4, FSH 9.4, . TPO Ab <10  - Suspect that this is likely non-functioning pituitary adenoma given most pituitary hormone levels without significant abnormalities.  - s/p resection 7/11, denies any symptoms post-op  - post op TFTs wnl (7/13/24: TSH 1.0, FT4 1.4), unable to assess AM cortisol since patient was started on dexamethasone 4mg IV q6h since 5am 7/4    - 7/13/24: Urine output increased to 400cc/hr w/ low urine osm/spec gravity with Na 144 - s/p DDAVP 0.05mg at 12pm, required another dose at on 7/13/24 at 10pm   - thus far patient with 2 doses of DDAVP (7/13/24 12pm and 10pm)    Recommendations  - c/w DI watch  - Patient on Decadron 4 mg q6. Unable to assess HPA axis while on steroids  - may repeat other pituitary labs when patient as OP: IGF-1, LH, FSH, prolactin    *DI WATCH*:  - Goal Na 140-145  - Please monitor strict I/O, sodium levels q12h, and urine osm q12hrs  - DI is suspected if UOP is >250cc/hr x 2 consecutive hours or if >500cc/hr x 1 hr - if this occurs please check STAT serum Na, urine osm, urine specific gravity  - if specific gravity <1.005/Urine Osm <300 and Na is rising - likely DI, please dose DDAVP 0.05mg PO x1 (or DDAVP 0.5mcg IV if no PO access) and bolus 1/2 NS to match half the output (for example, if net negative 2Liters can give 1Liter 1/2NS if pt is unable to keep up with output with PO intake) - continue DI watch  - If Na continues to increase despite a a DDAVP dose and 1/2NS fluid bolus please inform endocrine   - Please make sure pt has access to water and encourage her to drink to thirst     #Thyroid goiter  She has thyroid goiter for which she follows up with Endocrinologist Dr. Espino ([facility]) since 2021. Only been monitoring with US.   - Follow up outpatient with Dr. Espino    #HLD  - , from 96 in June  - c/w atorva 20 for now      Discussed recommendations with primary team.    Benson Lee MD  Endocrine Fellow  Can be reached via [employer] teams.    For follow up questions, discharge recommendations, or new consults, please email [email or web address] or [email or web address] ([facility]) or call answering service at [phone] (weekdays); [phone] (nights/weekends).  For emergencies please page fellow on call.

## 2024-07-14 NOTE — PROGRESS NOTE ADULT - SUBJECTIVE AND OBJECTIVE BOX
Patient seen and examined at bedside.    --Anticoagulation--  enoxaparin Injectable 40 milliGRAM(s) SubCutaneous <User Schedule>    T(C): 36.7 (07-13-24 @ 23:00), Max: 37.2 (07-13-24 @ 11:00)  HR: 67 (07-13-24 @ 23:00) (64 - 75)  BP: --  RR: 17 (07-13-24 @ 23:00) (13 - 17)  SpO2: 97% (07-13-24 @ 23:00) (94% - 97%)  Wt(kg): --    Exam: AOx3, EOMI, able to finger count with each eye individually, no facial, no drift, BHAGAT 5/5, no CSF leak

## 2024-07-14 NOTE — PROGRESS NOTE ADULT - ASSESSMENT
ASSESSMENT: TSP resection of pituitary adenoma, POD 2    N   #07/11/24 s/p expanded endonasal approach for non functioning pituitary adenoma with 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 with nasoseptal flap repair  MRI wwo post-op   wean dexamethasone   #CSF leak  Skull base precautions/monitor CSF leakage    #Pain: Tylenol and oxycodone  #Activity: OOB     CV   #-160mmHg    P   #Pituitary precautions (no incentive spirometry, no straws, no BIPAP)    R   #Strict I+Os  Drink to thirst     GI   #Diet: regular  #Senna miralax  Last BM 7/13    ID   Afebrile     ENDO:   #Goal euglycemia (-180)  ISS while on dexamethasone   #Pituitary labs: Cortisol AM x 3d  TSH, FSH, LH, Prolactin, T4, in AM POD1   #Monitor for DI, BMP q8h Osm, Ulytes spec gravity   #HLD continue atorvastatin     H  #DVT ppx:   SCDs   Chemoprophylaxis   LED 7/4 no DVT     #CODE STATUS: FULL CODE    #DISPOSITION: ICU  ASSESSMENT: TSP resection of pituitary adenoma, POD 2    N   #07/11/24 s/p pituitary adenoma resection  wean dexamethasone 4d taper to off  CSF leak - Skull base precautions/monitor CSF leakage    Pain: Tylenol 975mg PRN and tramadol PRN  #Activity: PT/OT    CV:  -160mmHg  anti-HTN PRN  lipitor 20mg    Pulm:   Pituitary precautions (no incentive spirometry, no straws, no BIPAP)  mobilize to avoid atelectasis    Renal/:  BMP and UA  Strict I+Os  Drink to thirst     GI   Diet: regular  Senna miralax    ID:  monitor WBC and fever curve    ENDO:   endocrine following, appreciate recs  Goal euglycemia (-180)  ISS while on dexamethasone   Monitor for DI - BMP q8h Osm, Ulytes spec gravity     Heme: LED 7/14 no DVT   #DVT ppx:   SCDs   Chemoprophylaxis - refusing    Dispo: NSGY stepdown

## 2024-07-14 NOTE — PROGRESS NOTE ADULT - SUBJECTIVE AND OBJECTIVE BOX
Benson Lee MD, PGY-5  Endocrinology fellow  Available on [employer] Teams    Interval Events: No acute overnight events. Pt seen and examined. Reports less urination and thirst after DDAVP dose last night at 10pm. Tolerating PO, no nausea/vomiting. No hypoglycemia symptoms. Denies fevers, chills, CP, SOB, Abdominal pain, constipation, Diarrhea.      MEDICATIONS  (STANDING):  atorvastatin 20 milliGRAM(s) Oral at bedtime  bisacodyl 5 milliGRAM(s) Oral at bedtime  chlorhexidine 4% Liquid 1 Application(s) Topical <User Schedule>  dexAMETHasone     Tablet   Oral   dexAMETHasone     Tablet 4 milliGRAM(s) Oral every 12 hours  enoxaparin Injectable 40 milliGRAM(s) SubCutaneous <User Schedule>  famotidine    Tablet 20 milliGRAM(s) Oral every 12 hours  insulin lispro (ADMELOG) corrective regimen sliding scale   SubCutaneous Before meals and at bedtime  polyethylene glycol 3350 17 Gram(s) Oral two times a day  senna 2 Tablet(s) Oral at bedtime  sodium chloride 0.65% Nasal 2 Spray(s) Both Nostrils four times a day    MEDICATIONS  (PRN):  acetaminophen     Tablet .. 975 milliGRAM(s) Oral every 6 hours PRN Temp greater or equal to 38C (100.4F), Moderate Pain (4 - 6)  bisacodyl 5 milliGRAM(s) Oral daily PRN Constipation  ondansetron Injectable 4 milliGRAM(s) IV Push every 6 hours PRN Nausea and/or Vomiting      Allergies    No Known Allergies    Intolerances          ================PHYSICAL EXAM======================  VITALS: T(C): 36.5 (07-14-24 @ 11:00)  T(F): 97.7 (07-14-24 @ 11:00), Max: 98.4 (07-13-24 @ 15:00)  HR: 66 (07-14-24 @ 11:00) (64 - 84)  BP: --  RR:  (12 - 20)  SpO2:  (95% - 97%)  Wt(kg): --  GENERAL: NAD, appears comfortable  EYES: No proptosis, no lid lag, anicteric  HEENT:  Atraumatic, Normocephalic, moist mucous membranes  RESPIRATORY: nonlabored respirations, no wheezing  PSYCH: Alert and awake  ===================================================    CAPILLARY BLOOD GLUCOSE      POCT Blood Glucose.: 131 mg/dL (14 Jul 2024 11:17)  POCT Blood Glucose.: 115 mg/dL (14 Jul 2024 06:06)  POCT Blood Glucose.: 115 mg/dL (13 Jul 2024 21:11)  POCT Blood Glucose.: 125 mg/dL (13 Jul 2024 16:34)      07-14    143  |  107  |  19  ----------------------------<  137<H>  3.9   |  24  |  0.65    eGFR: 110    Ca    9.3      07-14            Thyroid Function Tests:  07-13 @ 04:58 TSH 1.04 FreeT4 1.4 T3 -- Anti TPO -- Anti Thyroglobulin Ab -- TSI --  07-05 @ 06:49 TSH -- FreeT4 1.0 T3 -- Anti TPO <10.0 Anti Thyroglobulin Ab -- TSI --

## 2024-07-14 NOTE — PROGRESS NOTE ADULT - NS ATTEND AMEND GEN_ALL_CORE FT
Pituitary tumor resection.
Medically complex 2/2 pituitary tumor s/p resection, DI  Non-critical care time: 30 min

## 2024-07-14 NOTE — PROGRESS NOTE ADULT - ASSESSMENT
45 y/o F, POD #3 from TSRP with high flow leak, repaired with duragen inlay, duraguard button, L nasoseptal flap. Pt seen and examined at bedside, c/o serosanguinous discharge from nares and blood clots in throat yesterday but none today. also c/o nasal discomfort. Pt doing well. No evidence of CSF leak or epistaxis on exam.

## 2024-07-14 NOTE — PROGRESS NOTE ADULT - SUBJECTIVE AND OBJECTIVE BOX
ENT ISSUE/POD: 44y s/p TSRP with high flow leak, repaired with duragen inlay, duraguard button, L nasoseptal flap on 7/11/24 POD #3     HPI: 43 y/o F, POD #3 from TSRP with high flow leak, repaired with duragen inlay, duraguard button, L nasoseptal flap. Pt seen and examined at bedside          PAST MEDICAL & SURGICAL HISTORY:  Hyperlipidemia      H/O: hysterectomy        Allergies    No Known Allergies    Intolerances      MEDICATIONS  (STANDING):  atorvastatin 20 milliGRAM(s) Oral at bedtime  bisacodyl 5 milliGRAM(s) Oral at bedtime  chlorhexidine 4% Liquid 1 Application(s) Topical <User Schedule>  dexAMETHasone     Tablet   Oral   dexAMETHasone     Tablet 4 milliGRAM(s) Oral every 12 hours  enoxaparin Injectable 40 milliGRAM(s) SubCutaneous <User Schedule>  famotidine    Tablet 20 milliGRAM(s) Oral every 12 hours  insulin lispro (ADMELOG) corrective regimen sliding scale   SubCutaneous Before meals and at bedtime  polyethylene glycol 3350 17 Gram(s) Oral two times a day  senna 2 Tablet(s) Oral at bedtime    MEDICATIONS  (PRN):  bisacodyl 5 milliGRAM(s) Oral daily PRN Constipation  ondansetron Injectable 4 milliGRAM(s) IV Push every 6 hours PRN Nausea and/or Vomiting  oxyCODONE    IR 5 milliGRAM(s) Oral every 4 hours PRN Moderate Pain (4 - 6)      Social History: see consult    Family history: see consult    ROS:   ENT: all negative except as noted in HPI   Pulm: denies SOB, cough, hemoptysis  Neuro: denies numbness/tingling, loss of sensation  Endo: denies heat/cold intolerance, excessive sweating      Vital Signs Last 24 Hrs  T(C): 36.7 (14 Jul 2024 03:00), Max: 37.2 (13 Jul 2024 11:00)  T(F): 98.1 (14 Jul 2024 03:00), Max: 99 (13 Jul 2024 11:00)  HR: 84 (14 Jul 2024 03:00) (64 - 84)  BP: --  BP(mean): --  RR: 16 (14 Jul 2024 03:00) (13 - 17)  SpO2: 96% (14 Jul 2024 03:00) (95% - 97%)    Parameters below as of 13 Jul 2024 19:00  Patient On (Oxygen Delivery Method): room air         07-14    136  |  104  |  16  ----------------------------<  133<H>  5.1   |  21<L>  |  0.59    Ca    8.9      14 Jul 2024 04:02         PHYSICAL EXAM:  Gen: NAD  Skin: No rashes, bruises, or lesions  Head: Normocephalic, Atraumatic  Face: no edema, erythema, or fluctuance. Parotid glands soft without mass  Eyes: no scleral injection  Nose: Nares bilaterally with dissolvable packings, no discharge  Mouth: No Stridor / Drooling / Trismus.  Mucosa moist, tongue/uvula midline, oropharynx clear  Neck: Flat, supple, no lymphadenopathy, trachea midline, no masses  Lymphatic: No lymphadenopathy  Resp: breathing easily, no stridor  Neuro: facial nerve intact, no facial droop           ENT ISSUE/POD: 44y s/p TSRP with high flow leak, repaired with duragen inlay, duraguard button, L nasoseptal flap on 7/11/24 POD #3     HPI: 43 y/o F, POD #3 from TSRP with high flow leak, repaired with duragen inlay, duraguard button, L nasoseptal flap. Pt seen and examined at bedside, c/o serosanguinous discharge from nares and blood clots in throat yesterday but none today. also c/o nasal discomfort. Denies salty or metallic taste, post nasal drip, epistaxis, nasal discharge, headache or pain.           PAST MEDICAL & SURGICAL HISTORY:  Hyperlipidemia      H/O: hysterectomy        Allergies    No Known Allergies    Intolerances      MEDICATIONS  (STANDING):  atorvastatin 20 milliGRAM(s) Oral at bedtime  bisacodyl 5 milliGRAM(s) Oral at bedtime  chlorhexidine 4% Liquid 1 Application(s) Topical <User Schedule>  dexAMETHasone     Tablet   Oral   dexAMETHasone     Tablet 4 milliGRAM(s) Oral every 12 hours  enoxaparin Injectable 40 milliGRAM(s) SubCutaneous <User Schedule>  famotidine    Tablet 20 milliGRAM(s) Oral every 12 hours  insulin lispro (ADMELOG) corrective regimen sliding scale   SubCutaneous Before meals and at bedtime  polyethylene glycol 3350 17 Gram(s) Oral two times a day  senna 2 Tablet(s) Oral at bedtime    MEDICATIONS  (PRN):  bisacodyl 5 milliGRAM(s) Oral daily PRN Constipation  ondansetron Injectable 4 milliGRAM(s) IV Push every 6 hours PRN Nausea and/or Vomiting  oxyCODONE    IR 5 milliGRAM(s) Oral every 4 hours PRN Moderate Pain (4 - 6)      Social History: see consult    Family history: see consult    ROS:   ENT: all negative except as noted in HPI   Pulm: denies SOB, cough, hemoptysis  Neuro: denies numbness/tingling, loss of sensation  Endo: denies heat/cold intolerance, excessive sweating      Vital Signs Last 24 Hrs  T(C): 36.7 (14 Jul 2024 03:00), Max: 37.2 (13 Jul 2024 11:00)  T(F): 98.1 (14 Jul 2024 03:00), Max: 99 (13 Jul 2024 11:00)  HR: 84 (14 Jul 2024 03:00) (64 - 84)  BP: --  BP(mean): --  RR: 16 (14 Jul 2024 03:00) (13 - 17)  SpO2: 96% (14 Jul 2024 03:00) (95% - 97%)    Parameters below as of 13 Jul 2024 19:00  Patient On (Oxygen Delivery Method): room air         07-14    136  |  104  |  16  ----------------------------<  133<H>  5.1   |  21<L>  |  0.59    Ca    8.9      14 Jul 2024 04:02         PHYSICAL EXAM:  Gen: NAD  Skin: No rashes, bruises, or lesions  Head: Normocephalic, Atraumatic  Face: no edema, erythema, or fluctuance. Parotid glands soft without mass  Eyes: no scleral injection  Nose: b/l nares- dried blood. no epistaxis or nasal discharge  Mouth: No Stridor / Drooling / Trismus.  Mucosa moist, tongue/uvula midline, oropharynx clear  Neck: Flat, supple, no lymphadenopathy, trachea midline, no masses  Lymphatic: No lymphadenopathy  Resp: breathing easily, no stridor  Neuro: facial nerve intact, no facial droop

## 2024-07-15 LAB
ANION GAP SERPL CALC-SCNC: 12 MMOL/L — SIGNIFICANT CHANGE UP (ref 5–17)
BUN SERPL-MCNC: 15 MG/DL — SIGNIFICANT CHANGE UP (ref 7–23)
CALCIUM SERPL-MCNC: 9 MG/DL — SIGNIFICANT CHANGE UP (ref 8.4–10.5)
CHLORIDE SERPL-SCNC: 108 MMOL/L — SIGNIFICANT CHANGE UP (ref 96–108)
CO2 SERPL-SCNC: 24 MMOL/L — SIGNIFICANT CHANGE UP (ref 22–31)
CREAT SERPL-MCNC: 0.62 MG/DL — SIGNIFICANT CHANGE UP (ref 0.5–1.3)
EGFR: 111 ML/MIN/1.73M2 — SIGNIFICANT CHANGE UP
GLUCOSE BLDC GLUCOMTR-MCNC: 100 MG/DL — HIGH (ref 70–99)
GLUCOSE BLDC GLUCOMTR-MCNC: 120 MG/DL — HIGH (ref 70–99)
GLUCOSE BLDC GLUCOMTR-MCNC: 150 MG/DL — HIGH (ref 70–99)
GLUCOSE BLDC GLUCOMTR-MCNC: 99 MG/DL — SIGNIFICANT CHANGE UP (ref 70–99)
GLUCOSE SERPL-MCNC: 120 MG/DL — HIGH (ref 70–99)
OSMOLALITY SERPL: 298 MOSMOL/KG — SIGNIFICANT CHANGE UP (ref 275–300)
OSMOLALITY UR: 252 MOS/KG — LOW (ref 300–900)
OSMOLALITY UR: 439 MOS/KG — SIGNIFICANT CHANGE UP (ref 300–900)
POTASSIUM SERPL-MCNC: 4.2 MMOL/L — SIGNIFICANT CHANGE UP (ref 3.5–5.3)
POTASSIUM SERPL-SCNC: 4.2 MMOL/L — SIGNIFICANT CHANGE UP (ref 3.5–5.3)
SODIUM SERPL-SCNC: 144 MMOL/L — SIGNIFICANT CHANGE UP (ref 135–145)
SP GR UR STRIP: 1.01 — SIGNIFICANT CHANGE UP (ref 1–1.03)
SP GR UR STRIP: 1.01 — SIGNIFICANT CHANGE UP (ref 1–1.03)

## 2024-07-15 PROCEDURE — 99232 SBSQ HOSP IP/OBS MODERATE 35: CPT

## 2024-07-15 PROCEDURE — 93010 ELECTROCARDIOGRAM REPORT: CPT

## 2024-07-15 PROCEDURE — 99233 SBSQ HOSP IP/OBS HIGH 50: CPT

## 2024-07-15 RX ORDER — ACETAMINOPHEN 325 MG
1000 TABLET ORAL ONCE
Refills: 0 | Status: COMPLETED | OUTPATIENT
Start: 2024-07-15 | End: 2024-07-15

## 2024-07-15 RX ORDER — MAGNESIUM CITRATE 1.75 G/29.6ML
296 LIQUID ORAL ONCE
Refills: 0 | Status: COMPLETED | OUTPATIENT
Start: 2024-07-16 | End: 2024-07-16

## 2024-07-15 RX ORDER — CALCIUM CARBONATE 500(1250)
1 TABLET,CHEWABLE ORAL ONCE
Refills: 0 | Status: COMPLETED | OUTPATIENT
Start: 2024-07-15 | End: 2024-07-15

## 2024-07-15 RX ORDER — DEXTROSE MONOHYDRATE AND SODIUM CHLORIDE 5; .3 G/100ML; G/100ML
500 INJECTION, SOLUTION INTRAVENOUS
Refills: 0 | Status: COMPLETED | OUTPATIENT
Start: 2024-07-15 | End: 2024-07-15

## 2024-07-15 RX ORDER — PANTOPRAZOLE SODIUM 40 MG/10ML
40 INJECTION, POWDER, FOR SOLUTION INTRAVENOUS
Refills: 0 | Status: DISCONTINUED | OUTPATIENT
Start: 2024-07-15 | End: 2024-07-16

## 2024-07-15 RX ADMIN — Medication 1000 MILLIGRAM(S): at 01:30

## 2024-07-15 RX ADMIN — DEXTROSE MONOHYDRATE AND SODIUM CHLORIDE 500 MILLILITER(S): 5; .3 INJECTION, SOLUTION INTRAVENOUS at 06:29

## 2024-07-15 RX ADMIN — DEXAMETHASONE 4 MILLIGRAM(S): 1 TABLET ORAL at 06:28

## 2024-07-15 RX ADMIN — Medication 400 MILLIGRAM(S): at 07:30

## 2024-07-15 RX ADMIN — Medication 1 TABLET(S): at 23:36

## 2024-07-15 RX ADMIN — Medication 2 SPRAY(S): at 11:53

## 2024-07-15 RX ADMIN — ENOXAPARIN SODIUM 40 MILLIGRAM(S): 100 INJECTION SUBCUTANEOUS at 17:21

## 2024-07-15 RX ADMIN — Medication 20 MILLIGRAM(S): at 17:20

## 2024-07-15 RX ADMIN — Medication 400 MILLIGRAM(S): at 01:15

## 2024-07-15 RX ADMIN — DEXAMETHASONE 4 MILLIGRAM(S): 1 TABLET ORAL at 17:20

## 2024-07-15 RX ADMIN — Medication 2 SPRAY(S): at 06:30

## 2024-07-15 RX ADMIN — Medication 2 SPRAY(S): at 17:29

## 2024-07-15 RX ADMIN — Medication 400 MILLIGRAM(S): at 17:15

## 2024-07-15 RX ADMIN — Medication 2 SPRAY(S): at 01:00

## 2024-07-15 RX ADMIN — Medication 1000 MILLIGRAM(S): at 07:45

## 2024-07-15 NOTE — PROGRESS NOTE ADULT - SUBJECTIVE AND OBJECTIVE BOX
NSICU DAY PROGRESS NOTE     Please see neurosurgery H&P for HPI.     12h events  - overnight increased urine output >300/h, high serum osm, serum Na stable, normal spec grav, gave 1 time ddavp 0.05 po 8PM      Exam:   HEENT: neck supple    Neurology: Alert, awake, oriented on self, place and year, speech fluent, follows simple commands, PERRL, EOMI, OS sees shadows, VFF to finger counting, gaze midline, face symmetric   UE/LE 5/5  Cardiovascular: Regular rate and rhythm   Pulmonary: normal WOB, no accessory muscle use  Gastrointestinal: Soft, Non-tender, Non-distended, LBM 7/14   Skin: warm and dry, no edema      VITALS:   Vital Signs Last 24 Hrs  T(C): 36.7 (14 Jul 2024 03:00), Max: 37.2 (13 Jul 2024 11:00)  T(F): 98.1 (14 Jul 2024 03:00), Max: 99 (13 Jul 2024 11:00)  HR: 84 (14 Jul 2024 03:00) (64 - 84)  RR: 16 (14 Jul 2024 03:00) (13 - 17)  SpO2: 96% (14 Jul 2024 03:00) (95% - 97%)    Parameters below as of 13 Jul 2024 19:00  Patient On (Oxygen Delivery Method): room air    LABS:    07-14    136  |  104  |  16  ----------------------------<  133<H>  5.1   |  21<L>  |  0.59      CAPILLARY BLOOD GLUCOSE      POCT Blood Glucose.: 115 mg/dL (14 Jul 2024 06:06)  POCT Blood Glucose.: 115 mg/dL (13 Jul 2024 21:11)  POCT Blood Glucose.: 125 mg/dL (13 Jul 2024 16:34)  POCT Blood Glucose.: 155 mg/dL (13 Jul 2024 10:57)  POCT Blood Glucose.: 131 mg/dL (13 Jul 2024 07:51)      I&O's Summary    12 Jul 2024 07:01  -  13 Jul 2024 07:00  --------------------------------------------------------  IN: 3035 mL / OUT: 3330 mL / NET: -295 mL    13 Jul 2024 07:01  -  14 Jul 2024 06:43  --------------------------------------------------------  IN: 2820 mL / OUT: 5500 mL / NET: -2680 mL      Imaging   LE doppler - no DVT    MEDICATION LEVELS:     IVF FLUIDS/MEDICATIONS:   MEDICATIONS  (STANDING):  atorvastatin 20 milliGRAM(s) Oral at bedtime  bisacodyl 5 milliGRAM(s) Oral at bedtime  chlorhexidine 4% Liquid 1 Application(s) Topical <User Schedule>  dexAMETHasone     Tablet   Oral   dexAMETHasone     Tablet 4 milliGRAM(s) Oral every 12 hours  enoxaparin Injectable 40 milliGRAM(s) SubCutaneous <User Schedule>  famotidine    Tablet 20 milliGRAM(s) Oral every 12 hours  insulin lispro (ADMELOG) corrective regimen sliding scale   SubCutaneous Before meals and at bedtime  polyethylene glycol 3350 17 Gram(s) Oral two times a day  senna 2 Tablet(s) Oral at bedtime    MEDICATIONS  (PRN):  bisacodyl 5 milliGRAM(s) Oral daily PRN Constipation  ondansetron Injectable 4 milliGRAM(s) IV Push every 6 hours PRN Nausea and/or Vomiting  oxyCODONE    IR 5 milliGRAM(s) Oral every 4 hours PRN Moderate Pain (4 - 6)       NSICU DAY PROGRESS NOTE     Please see neurosurgery H&P for HPI.     12h events  - overnight increased urine output >300/h, high serum osm, serum Na stable, normal spec grav, gave 1 time ddavp 0.05 po 8PM    07/15: JUSTIN. DI resolved. Bedboarded      Exam:   HEENT: neck supple    Neurology: Alert, awake, oriented on self, place and year, speech fluent, follows simple commands, PERRL, EOMI, OS sees shadows, VFF to finger counting, gaze midline, face symmetric   UE/LE 5/5  Cardiovascular: Regular rate and rhythm   Pulmonary: normal WOB, no accessory muscle use  Gastrointestinal: Soft, Non-tender, Non-distended, LBM 7/14   Skin: warm and dry, no edema      VITALS:   Vital Signs Last 24 Hrs  T(C): 36.7 (14 Jul 2024 03:00), Max: 37.2 (13 Jul 2024 11:00)  T(F): 98.1 (14 Jul 2024 03:00), Max: 99 (13 Jul 2024 11:00)  HR: 84 (14 Jul 2024 03:00) (64 - 84)  RR: 16 (14 Jul 2024 03:00) (13 - 17)  SpO2: 96% (14 Jul 2024 03:00) (95% - 97%)    Parameters below as of 13 Jul 2024 19:00  Patient On (Oxygen Delivery Method): room air    LABS:    07-14    136  |  104  |  16  ----------------------------<  133<H>  5.1   |  21<L>  |  0.59      CAPILLARY BLOOD GLUCOSE      POCT Blood Glucose.: 115 mg/dL (14 Jul 2024 06:06)  POCT Blood Glucose.: 115 mg/dL (13 Jul 2024 21:11)  POCT Blood Glucose.: 125 mg/dL (13 Jul 2024 16:34)  POCT Blood Glucose.: 155 mg/dL (13 Jul 2024 10:57)  POCT Blood Glucose.: 131 mg/dL (13 Jul 2024 07:51)      I&O's Summary    12 Jul 2024 07:01  -  13 Jul 2024 07:00  --------------------------------------------------------  IN: 3035 mL / OUT: 3330 mL / NET: -295 mL    13 Jul 2024 07:01  -  14 Jul 2024 06:43  --------------------------------------------------------  IN: 2820 mL / OUT: 5500 mL / NET: -2680 mL      Imaging   LE doppler - no DVT    MEDICATION LEVELS:     IVF FLUIDS/MEDICATIONS:   MEDICATIONS  (STANDING):  atorvastatin 20 milliGRAM(s) Oral at bedtime  bisacodyl 5 milliGRAM(s) Oral at bedtime  chlorhexidine 4% Liquid 1 Application(s) Topical <User Schedule>  dexAMETHasone     Tablet   Oral   dexAMETHasone     Tablet 4 milliGRAM(s) Oral every 12 hours  enoxaparin Injectable 40 milliGRAM(s) SubCutaneous <User Schedule>  famotidine    Tablet 20 milliGRAM(s) Oral every 12 hours  insulin lispro (ADMELOG) corrective regimen sliding scale   SubCutaneous Before meals and at bedtime  polyethylene glycol 3350 17 Gram(s) Oral two times a day  senna 2 Tablet(s) Oral at bedtime    MEDICATIONS  (PRN):  bisacodyl 5 milliGRAM(s) Oral daily PRN Constipation  ondansetron Injectable 4 milliGRAM(s) IV Push every 6 hours PRN Nausea and/or Vomiting  oxyCODONE    IR 5 milliGRAM(s) Oral every 4 hours PRN Moderate Pain (4 - 6)

## 2024-07-15 NOTE — DISCHARGE NOTE PROVIDER - NSDCFUSCHEDAPPT_GEN_ALL_CORE_FT
Bonifacio Goncalves  [facility] Physician [facility]  NEUROSURG [facility] [address]  Scheduled Appointment: 07/17/2024

## 2024-07-15 NOTE — PROGRESS NOTE ADULT - SUBJECTIVE AND OBJECTIVE BOX
Agustina Brar MD, PGY-4  Endocrinology fellow  Available on [employer] Teams    Interval Events: No acute overnight events. Pt seen and examined. Tolerating PO, no nausea/vomiting. No hypoglycemia symptoms. Denies fevers, chills, CP, SOB, abdominal pain, constipation, diarrhea.      MEDICATIONS  (STANDING):  atorvastatin 20 milliGRAM(s) Oral at bedtime  bisacodyl 5 milliGRAM(s) Oral at bedtime  chlorhexidine 4% Liquid 1 Application(s) Topical <User Schedule>  dexAMETHasone     Tablet   Oral   dexAMETHasone     Tablet 4 milliGRAM(s) Oral every 12 hours  enoxaparin Injectable 40 milliGRAM(s) SubCutaneous <User Schedule>  famotidine    Tablet 20 milliGRAM(s) Oral every 12 hours  insulin lispro (ADMELOG) corrective regimen sliding scale   SubCutaneous Before meals and at bedtime  polyethylene glycol 3350 17 Gram(s) Oral two times a day  senna 2 Tablet(s) Oral at bedtime  sodium chloride 0.65% Nasal 2 Spray(s) Both Nostrils four times a day    MEDICATIONS  (PRN):  acetaminophen     Tablet .. 975 milliGRAM(s) Oral every 6 hours PRN Temp greater or equal to 38C (100.4F), Moderate Pain (4 - 6)  artificial tears (preservative free) Ophthalmic Solution 1 Drop(s) Both EYES five times a day PRN Dry Eyes  bisacodyl 5 milliGRAM(s) Oral daily PRN Constipation  ondansetron Injectable 4 milliGRAM(s) IV Push every 6 hours PRN Nausea and/or Vomiting      Allergies    No Known Allergies    Intolerances          ================PHYSICAL EXAM======================  VITALS: T(C): 36.7 (07-15-24 @ 07:00)  T(F): 98.1 (07-15-24 @ 07:00), Max: 98.1 (07-15-24 @ 06:00)  HR: 63 (07-15-24 @ 07:00) (59 - 79)  BP: 112/63 (07-15-24 @ 07:00) (111/64 - 126/81)  RR:  (12 - 20)  SpO2:  (95% - 97%)  Wt(kg): --  GENERAL: NAD, appears comfortable  EYES: No proptosis, no lid lag, anicteric  HEENT:  Atraumatic, Normocephalic, moist mucous membranes  CARDIOVASCULAR: RRR, no MRG, S1/S2  RESPIRATORY: nonlabored respirations, no wheezing  ABDOMEN: Soft, nontender, nondistended, normal bowel sounds  EXTREMITIES: 2+ peripheral pulses, no edema  PSYCH: Alert and awake  ===================================================    CAPILLARY BLOOD GLUCOSE      POCT Blood Glucose.: 100 mg/dL (15 Jul 2024 06:25)  POCT Blood Glucose.: 220 mg/dL (14 Jul 2024 22:03)  POCT Blood Glucose.: 121 mg/dL (14 Jul 2024 16:30)  POCT Blood Glucose.: 131 mg/dL (14 Jul 2024 11:17)      07-15    144  |  108  |  15  ----------------------------<  120<H>  4.2   |  24  |  0.62    eGFR: 111    Ca    9.0      07-15            Thyroid Function Tests:  07-13 @ 04:58 TSH 1.04 FreeT4 1.4 T3 -- Anti TPO -- Anti Thyroglobulin Ab -- TSI --  07-05 @ 06:49 TSH -- FreeT4 1.0 T3 -- Anti TPO <10.0 Anti Thyroglobulin Ab -- TSI --                       Agustina Brar MD, PGY-4  Endocrinology fellow  Available on [employer] Teams    Interval Events: No acute overnight events. Pt seen and examined. Tolerating PO, no nausea/vomiting. No hypoglycemia symptoms. Denies fevers, chills, CP, SOB, abdominal pain, constipation, diarrhea. Unable to obtain full 500 cc 1/2NS earlier as IV blew.      MEDICATIONS  (STANDING):  atorvastatin 20 milliGRAM(s) Oral at bedtime  bisacodyl 5 milliGRAM(s) Oral at bedtime  chlorhexidine 4% Liquid 1 Application(s) Topical <User Schedule>  dexAMETHasone     Tablet   Oral   dexAMETHasone     Tablet 4 milliGRAM(s) Oral every 12 hours  enoxaparin Injectable 40 milliGRAM(s) SubCutaneous <User Schedule>  famotidine    Tablet 20 milliGRAM(s) Oral every 12 hours  insulin lispro (ADMELOG) corrective regimen sliding scale   SubCutaneous Before meals and at bedtime  polyethylene glycol 3350 17 Gram(s) Oral two times a day  senna 2 Tablet(s) Oral at bedtime  sodium chloride 0.65% Nasal 2 Spray(s) Both Nostrils four times a day    MEDICATIONS  (PRN):  acetaminophen     Tablet .. 975 milliGRAM(s) Oral every 6 hours PRN Temp greater or equal to 38C (100.4F), Moderate Pain (4 - 6)  artificial tears (preservative free) Ophthalmic Solution 1 Drop(s) Both EYES five times a day PRN Dry Eyes  bisacodyl 5 milliGRAM(s) Oral daily PRN Constipation  ondansetron Injectable 4 milliGRAM(s) IV Push every 6 hours PRN Nausea and/or Vomiting      Allergies    No Known Allergies    Intolerances          ================PHYSICAL EXAM======================  VITALS: T(C): 36.7 (07-15-24 @ 07:00)  T(F): 98.1 (07-15-24 @ 07:00), Max: 98.1 (07-15-24 @ 06:00)  HR: 63 (07-15-24 @ 07:00) (59 - 79)  BP: 112/63 (07-15-24 @ 07:00) (111/64 - 126/81)  RR:  (12 - 20)  SpO2:  (95% - 97%)  Wt(kg): --  GENERAL: NAD, appears comfortable  EYES: No proptosis, no lid lag, anicteric  HEENT:  Atraumatic, Normocephalic, moist mucous membranes  CARDIOVASCULAR: RRR, no MRG, S1/S2  RESPIRATORY: nonlabored respirations, no wheezing  ABDOMEN: Soft, nontender, nondistended, normal bowel sounds  EXTREMITIES: 2+ peripheral pulses, no edema  PSYCH: Alert and awake  ===================================================    CAPILLARY BLOOD GLUCOSE      POCT Blood Glucose.: 100 mg/dL (15 Jul 2024 06:25)  POCT Blood Glucose.: 220 mg/dL (14 Jul 2024 22:03)  POCT Blood Glucose.: 121 mg/dL (14 Jul 2024 16:30)  POCT Blood Glucose.: 131 mg/dL (14 Jul 2024 11:17)      07-15    144  |  108  |  15  ----------------------------<  120<H>  4.2   |  24  |  0.62    eGFR: 111    Ca    9.0      07-15            Thyroid Function Tests:  07-13 @ 04:58 TSH 1.04 FreeT4 1.4 T3 -- Anti TPO -- Anti Thyroglobulin Ab -- TSI --  07-05 @ 06:49 TSH -- FreeT4 1.0 T3 -- Anti TPO <10.0 Anti Thyroglobulin Ab -- TSI --

## 2024-07-15 NOTE — PROGRESS NOTE ADULT - ASSESSMENT
45 y/o F, POD #4 from TSRP with high flow leak, repaired with duragen inlay, duraguard button, L nasoseptal flap. Pt seen and examined at bedside, c/o serosanguinous discharge from nares and blood clots in throat initially but none today. also c/o nasal discomfort. Pt doing well. No evidence of CSF leak or epistaxis on exam.

## 2024-07-15 NOTE — DISCHARGE NOTE PROVIDER - NSDCFUADDINST_GEN_ALL_CORE_FT
Please make all necessary appointments and follow up. Please DO NOT take any NSAIDs (Advil, Aleve, Motrin, Ibuprofen) until cleared by your Neurosurgeon. Please DO NOT do any heavy lifting, bending, twisting and straining. Please come to the emergency room for any of the following: altered mental status, seizures, pain uncontrolled by pain medications, fevers, leaking / bleeding from your nares, chest pain and shortness of breath.

## 2024-07-15 NOTE — DISCHARGE NOTE PROVIDER - CARE PROVIDER_API CALL
Bonifacio Goncalves  Neurosurgery  [address], [address]  [place] [address]  Phone: [phone]  Fax: [phone]  Follow Up Time:     Beka De Santiago  Otolaryngology  [address], [address]  [place] [address]  Phone: [phone]  Fax: [phone]  Follow Up Time:    Boniafcio Goncalves  Neurosurgery  [address], [address]  [place] [address]  Phone: [phone]  Fax: [phone]  Follow Up Time:     Beka De Santiago  Otolaryngology  [address], [address]  [place] [address]  Phone: [phone]  Fax: [phone]  Follow Up Time:     [facility]  Internal Medicine  [address] [place] [address]  Phone: [phone]  Fax: [phone]  Follow Up Time:

## 2024-07-15 NOTE — DISCHARGE NOTE PROVIDER - NSDCMRMEDTOKEN_GEN_ALL_CORE_FT
Lipitor 20 mg oral tablet: 1 tab(s) orally once a day (at bedtime)   acetaminophen 325 mg oral tablet: 2 tab(s) orally every 6 hours as needed for  mild pain  atorvastatin 20 mg oral tablet: 1 tab(s) orally once a day (at bedtime)  calcium carbonate 500 mg (200 mg elemental calcium) oral tablet, chewable: 1 tab(s) orally every 6 hours As needed Indigestion  dexAMETHasone 1 mg oral tablet: 2 tab(s) orally 2 times a day Please take 2 tablets twice a day on 7/18, then 2 tablets once on 7/19, then 1 tablet on 7/20, then 1 tablet on 7/21.  ocular lubricant ophthalmic ointment: 1 application to each affected eye once a day (at bedtime)  ocular lubricant ophthalmic solution: 1 drop(s) to each affected eye 5 times a day As needed Dry Eyes  pantoprazole 40 mg oral delayed release tablet: 1 tab(s) orally every 12 hours Please take Protonix 40mg twice a day for 2 weeks, then daily after.  polyethylene glycol 3350 oral powder for reconstitution: 17 gram(s) orally 2 times a day  senna leaf extract oral tablet: 2 tab(s) orally once a day (at bedtime)  sodium chloride 0.65% nasal spray: 2 spray(s) in each nostril 4 times a day

## 2024-07-15 NOTE — PROGRESS NOTE ADULT - SUBJECTIVE AND OBJECTIVE BOX
ENT ISSUE/POD: 44y s/p TSRP with high flow leak, repaired with duragen inlay, duraguard button, L nasoseptal flap on 7/11/24 POD #4     HPI: 43 y/o F, POD #4 from TSRP with high flow leak, repaired with duragen inlay, duraguard button, L nasoseptal flap. Pt seen and examined at bedside, c/o serosanguinous discharge from nares and blood clots in throat initially but none today. also c/o nasal discomfort. Denies salty or metallic taste, post nasal drip, epistaxis, nasal discharge, headache or pain.         PAST MEDICAL & SURGICAL HISTORY:  Hyperlipidemia      H/O: hysterectomy        Allergies    No Known Allergies    Intolerances      MEDICATIONS  (STANDING):  acetaminophen   IVPB .. 1000 milliGRAM(s) IV Intermittent once  atorvastatin 20 milliGRAM(s) Oral at bedtime  bisacodyl 5 milliGRAM(s) Oral at bedtime  chlorhexidine 4% Liquid 1 Application(s) Topical <User Schedule>  dexAMETHasone     Tablet   Oral   dexAMETHasone     Tablet 4 milliGRAM(s) Oral every 12 hours  enoxaparin Injectable 40 milliGRAM(s) SubCutaneous <User Schedule>  famotidine    Tablet 20 milliGRAM(s) Oral every 12 hours  insulin lispro (ADMELOG) corrective regimen sliding scale   SubCutaneous Before meals and at bedtime  polyethylene glycol 3350 17 Gram(s) Oral two times a day  senna 2 Tablet(s) Oral at bedtime  sodium chloride 0.65% Nasal 2 Spray(s) Both Nostrils four times a day    MEDICATIONS  (PRN):  acetaminophen     Tablet .. 975 milliGRAM(s) Oral every 6 hours PRN Temp greater or equal to 38C (100.4F), Moderate Pain (4 - 6)  artificial tears (preservative free) Ophthalmic Solution 1 Drop(s) Both EYES five times a day PRN Dry Eyes  bisacodyl 5 milliGRAM(s) Oral daily PRN Constipation  ondansetron Injectable 4 milliGRAM(s) IV Push every 6 hours PRN Nausea and/or Vomiting      Social History: see consult    Family history: see consult    ROS:   ENT: all negative except as noted in HPI   Pulm: denies SOB, cough, hemoptysis  Neuro: denies numbness/tingling, loss of sensation  Endo: denies heat/cold intolerance, excessive sweating      Vital Signs Last 24 Hrs  T(C): 36.7 (15 Jul 2024 07:00), Max: 36.7 (15 Jul 2024 06:00)  T(F): 98.1 (15 Jul 2024 07:00), Max: 98.1 (15 Jul 2024 06:00)  HR: 63 (15 Jul 2024 07:00) (59 - 79)  BP: 116/79 (15 Jul 2024 06:00) (111/64 - 126/81)  BP(mean): 94 (15 Jul 2024 06:00) (83 - 98)  RR: 12 (15 Jul 2024 07:00) (12 - 20)  SpO2: 96% (15 Jul 2024 07:00) (95% - 97%)    Parameters below as of 15 Jul 2024 07:00  Patient On (Oxygen Delivery Method): room air         07-15    144  |  108  |  15  ----------------------------<  120<H>  4.2   |  24  |  0.62    Ca    9.0      15 Jul 2024 04:14         PHYSICAL EXAM:  Gen: NAD  Skin: No rashes, bruises, or lesions  Head: Normocephalic, Atraumatic  Face: no edema, erythema, or fluctuance. Parotid glands soft without mass  Eyes: no scleral injection  Nose: b/l nares- dried blood. no epistaxis or nasal discharge  Mouth: No Stridor / Drooling / Trismus.  Mucosa moist, tongue/uvula midline, oropharynx clear  Neck: Flat, supple, no lymphadenopathy, trachea midline, no masses  Lymphatic: No lymphadenopathy  Resp: breathing easily, no stridor  Neuro: facial nerve intact, no facial droop

## 2024-07-15 NOTE — DISCHARGE NOTE PROVIDER - PROVIDER TOKENS
PROVIDER:[TOKEN:[[id number]]],PROVIDER:[TOKEN:[[id number]]] PROVIDER:[TOKEN:[[id number]]],PROVIDER:[TOKEN:[[id number]]],PROVIDER:[TOKEN:[[id number]]]

## 2024-07-15 NOTE — DISCHARGE NOTE PROVIDER - HOSPITAL COURSE
44F Faroese speaking ([facility] xfer) p/f progressive HAs/L eye blurry vision/short term mem loss x1mo, w/ acutely worse Lt side HA x2d. At 11AM yesterday suddenly could only see figures/shadows from Lt eye. Optho noted Lt optic nn. dysfxn/no papilledema. 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 Na and urine has stabilized since. Patient transferred back to [facility] on 7/15. PT/OT evaluated patient and deemed candidate for outpatient PT/OT.    NEURO:   - Continue neuro checks q 4  - Continue Decadron taper for post-op to finish on 7/19 (currently in 2mg BID)  - 7/12 MR Brain: interval rxn of pituitary macroadenoma w/ minimal residual in the medial left cavernous sinus, post-op heme, minimal mass effect on the left optic chiasm. No abnormal enhancement is seen.  - ENT following: Skullbase precautions: no straws, no nose blowing, avoid nasal trauma, no nasal cannula, no incentive spirometry; continue nasal saline spray. Patient to follow with Dr. De Santiago outpatient  - Ophthalmology saw patient pre-op, appreciate their recs. as patient was complaining of foreign body sensation over her right eye - per their evaluation: DDx for FBS includes dry eye vs foreign body vs pterygium. Less likely foreign body as no evidence of eye irritation or corneal abrasion or ulcer noted. May also be dryness related to white limbal lesion with inadequate tear film distribution. Etiology of white limbal lesion unclear at this time. May be suggestive of pterygium vs JOSE vs papilloma vs Dry eye. Patient likely Low concern for corneal ulcer at this time. Was placed on Lacri-lube and continue Artificial tears. Will need re-evaluation in 1 week if any changes in size of lesion. No complaints per patient.   - Pain control w/ Tylenol prn and Oxycodone prn  - PT/OT: outpatient PT/OT    PULM:   - On room air, O2Sat>95%  - No incentive spirometry as patient had transphenoidal surgery  - No nasal cannula, no CPAP/BIPAP/High flow; face tent only if needed (patient requested as she was feeling dry in her nares)    CV:  - -160  - Continue Lipitor for HLD  - 7/15 EKG done WNL  - 7/16 Trops negative, CPK WNL  - 7/16 Lipid Panel WNL    ENDO:   - Appreciate Endocrine following, awaiting discharge recommendations   - Continue to monitor for DI - last Na 141, specific grav 1.009 and urine osm 348. No DI.   - Currently on steroid taper, per Endo to check AM cortisol once off steroids. Patient and daughters know that patient will need follow up with her endocrinologist to have her AM cortisol checked soon as possible. They are currently making an appointment.   - I discussed signs and symptoms of adrenal insufficiency to monitor for: N/V, abdominal pain and dizziness  - Patient also has a thyroid goiter for which she follows up with Endocrinologist Dr. Espino ([facility]) since 2021. Only been monitoring with US. Follow up outpatient with Dr. Espino. Endocrine to follow after surgery.     HEME/ONC:             7/16 CBC stable, mild leukocytosis likely from steroids         DVT ppx: SQL, SCDs, 7/4 Le Dopp negative    RENAL:   - IVL  - 7/16 BMP stable  - Voiding    ID:   - Afebrile  - Received post-op abx  - Was Bactroban for +MSSA nares 7/4, finished 7/9    GI:    - Continue oral diet  - Senna and Miralax for bowel regimen, last BM 7/14  - C/o epigastric pain - Amylase and Lipase elevated, US Abd: WNL  - GI following: suspect erosive gastropathy in the setting of steroids, and elevated Lipase can occur as a consequence of PUD. Protonix BID x 2 weeks, then daily after, hold of endoscopy at this time and to follow outpatient.   - 7/16 LFTs mildly elevated, but on repeat on 7/17 improved  - Continue Maalox, and Tums  - Continue Protonix for GI ppx while on steroids    Internal Medicine following for co-medical management. Patient has a primary care doctor that she will follow with Dr. Welch    On day of discharge patient is neurologically and hemodynamically stable.    44F Belizean speaking ([facility] xfer) p/f progressive HAs/L eye blurry vision/short term mem loss x1mo, w/ acutely worse Lt side HA x2d. At 11AM yesterday suddenly could only see figures/shadows from Lt eye. Optho noted Lt optic nn. dysfxn/no papilledema. 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 Na and urine has stabilized since. Patient transferred back to [facility] on 7/15. PT/OT evaluated patient and deemed candidate for outpatient PT/OT.    NEURO:   - Continue neuro checks q 4  - Continue Decadron taper for post-op to finish on 7/19 (currently in 2mg BID)  - 7/12 MR Brain: interval rxn of pituitary macroadenoma w/ minimal residual in the medial left cavernous sinus, post-op heme, minimal mass effect on the left optic chiasm. No abnormal enhancement is seen.  - ENT following: Skullbase precautions: no straws, no nose blowing, avoid nasal trauma, no nasal cannula, no incentive spirometry; continue nasal saline spray. Patient to follow with Dr. De Santiago outpatient  - Ophthalmology saw patient pre-op, appreciate their recs. as patient was complaining of foreign body sensation over her right eye - per their evaluation: DDx for FBS includes dry eye vs foreign body vs pterygium. Less likely foreign body as no evidence of eye irritation or corneal abrasion or ulcer noted. May also be dryness related to white limbal lesion with inadequate tear film distribution. Etiology of white limbal lesion unclear at this time. May be suggestive of pterygium vs JOSE vs papilloma vs Dry eye. Patient likely Low concern for corneal ulcer at this time. Was placed on Lacri-lube and continue Artificial tears. Will need re-evaluation in 1 week if any changes in size of lesion. No complaints per patient.   - Pain control w/ Tylenol prn  - PT/OT: outpatient PT/OT    PULM:   - On room air, O2Sat>95%  - No incentive spirometry as patient had transphenoidal surgery  - No nasal cannula, no CPAP/BIPAP/High flow; face tent only if needed (patient requested as she was feeling dry in her nares)    CV:  - -160  - Continue Lipitor for HLD  - 7/15 EKG done WNL  - 7/16 Trops negative, CPK WNL  - 7/16 Lipid Panel WNL    ENDO:   - Appreciate Endocrine following, awaiting discharge recommendations   - Continue to monitor for DI - last Na 141, specific grav 1.009 and urine osm 348. No DI.   - Currently on steroid taper, per Endo to check AM cortisol once off steroids. Patient and daughters know that patient will need follow up with her endocrinologist to have her AM cortisol checked soon as possible. They are currently making an appointment.   - I discussed signs and symptoms of adrenal insufficiency to monitor for: N/V, abdominal pain and dizziness  - Patient also has a thyroid goiter for which she follows up with Endocrinologist Dr. Espino ([facility]) since 2021. Only been monitoring with US. Follow up outpatient with Dr. Espino. Endocrine to follow after surgery.     HEME/ONC:             7/16 CBC stable, mild leukocytosis likely from steroids         DVT ppx: SQL, SCDs, 7/4 Le Dopp negative    RENAL:   - IVL  - 7/16 BMP stable  - Voiding    ID:   - Afebrile  - Received post-op abx  - Was Bactroban for +MSSA nares 7/4, finished 7/9    GI:    - Continue oral diet  - Senna and Miralax for bowel regimen, last BM 7/14  - C/o epigastric pain - Amylase and Lipase elevated, US Abd: WNL  - GI following: suspect erosive gastropathy in the setting of steroids, and elevated Lipase can occur as a consequence of PUD. Protonix BID x 2 weeks, then daily after, hold of endoscopy at this time and to follow outpatient.   - 7/16 LFTs mildly elevated, but on repeat on 7/17 improved  - Continue Maalox, and Tums  - Continue Protonix for GI ppx while on steroids    Internal Medicine following for co-medical management. Patient has a primary care doctor that she will follow with Dr. Welch    On day of discharge patient is neurologically and hemodynamically stable.    44F French speaking ([facility] xfer) p/f progressive HAs/L eye blurry vision/short term mem loss x1mo, w/ acutely worse Lt side HA x2d. At 11AM yesterday suddenly could only see figures/shadows from Lt eye. Optho noted Lt optic nn. dysfxn/no papilledema. 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 Na and urine has stabilized since. Patient transferred back to [facility] on 7/15. PT/OT evaluated patient and deemed candidate for outpatient PT/OT.    NEURO:   - Continue neuro checks q 4  - Continue Decadron taper for post-op (currently in 2mg BID)  - 7/12 MR Brain: interval rxn of pituitary macroadenoma w/ minimal residual in the medial left cavernous sinus, post-op heme, minimal mass effect on the left optic chiasm. No abnormal enhancement is seen.  - ENT following: Skullbase precautions: no straws, no nose blowing, avoid nasal trauma, no nasal cannula, no incentive spirometry; continue nasal saline spray. Patient to follow with Dr. De Santiago outpatient  - Ophthalmology saw patient pre-op, appreciate their recs. as patient was complaining of foreign body sensation over her right eye - per their evaluation: DDx for FBS includes dry eye vs foreign body vs pterygium. Less likely foreign body as no evidence of eye irritation or corneal abrasion or ulcer noted. May also be dryness related to white limbal lesion with inadequate tear film distribution. Etiology of white limbal lesion unclear at this time. May be suggestive of pterygium vs JOSE vs papilloma vs Dry eye. Patient likely Low concern for corneal ulcer at this time. Was placed on Lacri-lube and continue Artificial tears. Will need re-evaluation in 1 week if any changes in size of lesion. No complaints per patient.   - Pain control w/ Tylenol prn  - PT/OT: outpatient PT/OT    PULM:   - On room air, O2Sat>95%  - No incentive spirometry as patient had transphenoidal surgery  - No nasal cannula, no CPAP/BIPAP/High flow; face tent only if needed (patient requested as she was feeling dry in her nares)    CV:  - -160  - Continue Lipitor for HLD  - 7/15 EKG done WNL  - 7/16 Trops negative, CPK WNL  - 7/16 Lipid Panel WNL    ENDO:   - Appreciate Endocrine following, spoke with Endocrine, patient to continue steroids (2mg BID until Thursday evening, 2mg daily on Friday and 1mg daily over the weekend) until patient sees Dr. Taveras on her appointment on Monday 7/22   - Continue to monitor for DI - last Na 141, specific grav 1.009 and urine osm 348. No DI.   - Currently on steroid taper, per Endo to check AM cortisol once off steroids. Patient and daughters know that patient will need follow up with her endocrinologist to have her AM cortisol checked soon as possible.   - I discussed signs and symptoms of adrenal insufficiency to monitor for: N/V, abdominal pain and dizziness, if these symptoms occur patient to contact Dr. Espino immediately.   - Patient also has a thyroid goiter for which she follows up with Endocrinologist Dr. Espino ([facility]) since 2021. Only been monitoring with US. Follow up outpatient with Dr. Espino. Endocrine to follow after surgery.     HEME/ONC:             7/16 CBC stable, mild leukocytosis likely from steroids         DVT ppx: SQL, SCDs, 7/4 Le Dopp negative    RENAL:   - IVL  - 7/16 BMP stable  - Voiding    ID:   - Afebrile  - Received post-op abx  - Was Bactroban for +MSSA nares 7/4, finished 7/9    GI:    - Continue oral diet  - Senna and Miralax for bowel regimen, last BM 7/14  - C/o epigastric pain - Amylase and Lipase elevated, US Abd: WNL  - GI following: suspect erosive gastropathy in the setting of steroids, and elevated Lipase can occur as a consequence of PUD. Protonix BID x 2 weeks, then daily after, hold of endoscopy at this time and to follow outpatient.   - 7/16 LFTs mildly elevated, but on repeat on 7/17 improved  - Continue Maalox, and Tums  - Continue Protonix for GI ppx while on steroids    Internal Medicine following for co-medical management. Patient has a primary care doctor that she will follow with Dr. Welch    On day of discharge patient is neurologically and hemodynamically stable.

## 2024-07-15 NOTE — DISCHARGE NOTE PROVIDER - CARE PROVIDERS DIRECT ADDRESSES
,[email or web address]

## 2024-07-15 NOTE — PROGRESS NOTE ADULT - ASSESSMENT
44F hx of thyroid goiter, HLD presenting for progressive HAs/L eye vision deficit. 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 Endocrine consulted for: Sellar mass, possibly pituitary adenoma    #Sellar mass, possibly pituitary adenoma  - 1.9cm sellar mass with L optic nerve compression with visual deficits with associated elevation in prolactin 153, diluted prolactin 108. Mildly elevated TSH 6.40, FT4 1.0, 2am cortisol 4.0, estradiol 36, LH 7.4, FSH 9.4, . TPO Ab <10  - Suspect that this is likely non-functioning pituitary adenoma given most pituitary hormone levels without significant abnormalities.  - s/p resection 7/11, denies any symptoms post-op  - post op TFTs wnl (7/13/24: TSH 1.0, FT4 1.4), unable to assess AM cortisol since patient was started on dexamethasone 4mg IV q6h since 5am 7/4    - 7/13/24: Urine output increased to 400cc/hr w/ low urine osm/spec gravity with Na 144 - s/p DDAVP 0.05mg at 12pm, required another dose at on 7/13/24 at 10pm   - thus far patient with 2 doses of DDAVP (7/13/24 12pm and 10pm)    Recommendations  - c/w DI watch  - Patient on Decadron 4 mg q6. Unable to assess HPA axis while on steroids  - may repeat other pituitary labs when patient as OP: IGF-1, LH, FSH, prolactin    *DI WATCH*:  - Goal Na 140-145  - Please monitor strict I/O, sodium levels q12h, and urine osm q12hrs  - DI is suspected if UOP is >250cc/hr x 2 consecutive hours or if >500cc/hr x 1 hr - if this occurs please check STAT serum Na, urine osm, urine specific gravity  - if specific gravity <1.005/Urine Osm <300 and Na is rising - likely DI, please dose DDAVP 0.05mg PO x1 (or DDAVP 0.5mcg IV if no PO access) and bolus 1/2 NS to match half the output (for example, if net negative 2Liters can give 1Liter 1/2NS if pt is unable to keep up with output with PO intake) - continue DI watch  - If Na continues to increase despite a a DDAVP dose and 1/2NS fluid bolus please inform endocrine   - Please make sure pt has access to water and encourage her to drink to thirst     #Thyroid goiter  She has thyroid goiter for which she follows up with Endocrinologist Dr. Espino ([facility]) since 2021. Only been monitoring with US.   - Follow up outpatient with Dr. Espino    #HLD  - , from 96 in June  - c/w atorva 20 for now      Discussed recommendations with primary team.    Benson Lee MD  Endocrine Fellow  Can be reached via [employer] teams.    For follow up questions, discharge recommendations, or new consults, please email [email or web address] ([email or web address]) or [email or web address] ([facility]) or call answering service at [phone] (weekdays); [phone] (nights/weekends).  For emergencies please page fellow on call.       44F hx of thyroid goiter, HLD presenting for progressive HAs/L eye vision deficit. 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 Endocrine consulted for: Sellar mass, possibly pituitary adenoma    #Sellar mass, possibly pituitary adenoma  - 1.9cm sellar mass with L optic nerve compression with visual deficits with associated elevation in prolactin 153, diluted prolactin 108. Mildly elevated TSH 6.40, FT4 1.0, 2am cortisol 4.0, estradiol 36, LH 7.4, FSH 9.4, . TPO Ab <10  - Suspect that this is likely non-functioning pituitary adenoma given most pituitary hormone levels without significant abnormalities.  - s/p resection 7/11, denies any symptoms post-op  - post op TFTs wnl (7/13/24: TSH 1.0, FT4 1.4), unable to assess AM cortisol since patient was started on dexamethasone 4mg IV q6h since 5am 7/4. Now tapered down to 4mg q12h.  - 7/13/24: Urine output increased to 400cc/hr w/ low urine osm/spec gravity with Na 144 - s/p DDAVP 0.05mg at 12pm, required another dose at on 7/13/24 at 10pm   - thus far patient with 2 doses of DDAVP (7/13/24 12pm and 10pm)  - 7/15/24: UOP improving ~150-200cc/hr    Recommendations  - c/w DI watch  - Patient on Decadron 4 mg q12. Unable to assess HPA axis while on steroids  - Will obtain AM cortisol once fully tapered off dexamethasone (per NSGY, planning to taper off completely this admission  - may repeat other pituitary labs when patient as OP: IGF-1, LH, FSH, prolactin    *DI WATCH*:  - Goal Na 140-145  - Please monitor strict I/O, sodium levels q12h, and urine osm q12hrs  - DI is suspected if UOP is >250cc/hr x 2 consecutive hours or if >500cc/hr x 1 hr - if this occurs please check STAT serum Na, urine osm, urine specific gravity  - if specific gravity <1.005/Urine Osm <300 and Na is rising - likely DI, please dose DDAVP 0.05mg PO x1 (or DDAVP 0.5mcg IV if no PO access) and bolus 1/2 NS to match half the output (for example, if net negative 2Liters can give 1Liter 1/2NS if pt is unable to keep up with output with PO intake) - continue DI watch  - If Na continues to increase despite a a DDAVP dose and 1/2NS fluid bolus please inform endocrine   - Please make sure pt has access to water and encourage her to drink to thirst     #Thyroid goiter  She has thyroid goiter for which she follows up with Endocrinologist Dr. Espino ([facility]) since 2021. Only been monitoring with US.   - Follow up outpatient with Dr. Espino    #HLD  - , from 96 in June  - c/w atorva 20 for now      Discussed recommendations with primary team.    D/w Dr. Farris

## 2024-07-15 NOTE — PROGRESS NOTE ADULT - ASSESSMENT
ASSESSMENT: TSP resection of pituitary adenoma, POD 2    N   #07/11/24 s/p pituitary adenoma resection  wean dexamethasone 4d taper to off  CSF leak - Skull base precautions/monitor CSF leakage    Pain: Tylenol 975mg PRN and tramadol PRN  #Activity: PT/OT    CV:  -160mmHg  anti-HTN PRN  lipitor 20mg    Pulm:   Pituitary precautions (no incentive spirometry, no straws, no BIPAP)  mobilize to avoid atelectasis    Renal/:  BMP and UA  Strict I+Os  Drink to thirst     GI   Diet: regular  Senna miralax    ID:  monitor WBC and fever curve    ENDO:   endocrine following, appreciate recs  Goal euglycemia (-180)  ISS while on dexamethasone   Monitor for DI - BMP q8h Osm, Ulytes spec gravity     Heme: LED 7/14 no DVT   #DVT ppx:   SCDs   Chemoprophylaxis - refusing    Dispo: NSGY stepdown ASSESSMENT: TSP resection of pituitary adenoma, POD 2    N   #07/11/24 s/p pituitary adenoma resection  wean dexamethasone 4d taper to off ( left sided blurry vision optho on board )  CSF leak - Skull base precautions/monitor CSF leakage    Pain: Tylenol 975mg PRN and tramadol PRN  #Activity: PT/OT    CV:  -160mmHg  anti-HTN PRN  lipitor 20mg    Pulm:   Pituitary precautions (no incentive spirometry, no straws, no BIPAP)  mobilize to avoid atelectasis    Renal/:  BMP and UA  Strict I+Os  Drink to thirst     GI   Diet: regular  Senna miralax    ID:  monitor WBC and fever curve    ENDO:   endocrine following, appreciate recs  Goal euglycemia (-180)  ISS while on dexamethasone   DI resolved  Plan to bedboard vs Home D/C    Heme: LED 7/14 no DVT   #DVT ppx:   SCDs   Chemoprophylaxis - Lovenox 40 mg QD    Dispo: NSGY stepdown

## 2024-07-15 NOTE — PROGRESS NOTE ADULT - SUBJECTIVE AND OBJECTIVE BOX
Interval events:  No acute events on evening rounds.   UO improved, no ddAVP given today.    VITALS:  T(C): , Max: 36.7 (07-14-24 @ 03:00)  HR:  (65 - 84)  BP:  (111/64 - 126/81)  ABP:  (113/75 - 123/81)  RR:  (12 - 20)  SpO2:  (95% - 97%)  Wt(kg): --      07-13-24 @ 07:01  -  07-14-24 @ 07:00  --------------------------------------------------------  IN: 2820 mL / OUT: 6100 mL / NET: -3280 mL    07-14-24 @ 07:01  -  07-15-24 @ 00:40  --------------------------------------------------------  IN: 3770 mL / OUT: 4700 mL / NET: -930 mL      LABS:  Na: 139 (07-14 @ 22:30), 139 (07-14 @ 21:11), 143 (07-14 @ 12:29), 136 (07-14 @ 04:02), 144 (07-13 @ 18:19), 144 (07-13 @ 09:31), 141 (07-13 @ 04:58), 144 (07-12 @ 22:31), 133 (07-12 @ 11:38), 136 (07-12 @ 05:45)  K: 5.4 (07-14 @ 22:30), 5.8 (07-14 @ 21:11), 3.9 (07-14 @ 12:29), 5.1 (07-14 @ 04:02), 4.5 (07-13 @ 18:19), 4.0 (07-13 @ 09:31), 4.2 (07-13 @ 04:58), 4.7 (07-12 @ 22:31), 4.0 (07-12 @ 11:38), 3.5 (07-12 @ 05:45)  Cl: 106 (07-14 @ 22:30), 107 (07-14 @ 21:11), 107 (07-14 @ 12:29), 104 (07-14 @ 04:02), 105 (07-13 @ 18:19), 108 (07-13 @ 09:31), 109 (07-13 @ 04:58), 111 (07-12 @ 22:31), 101 (07-12 @ 11:38), 103 (07-12 @ 05:45)  CO2: 19 (07-14 @ 22:30), 18 (07-14 @ 21:11), 24 (07-14 @ 12:29), 21 (07-14 @ 04:02), 27 (07-13 @ 18:19), 23 (07-13 @ 09:31), 21 (07-13 @ 04:58), 23 (07-12 @ 22:31), 18 (07-12 @ 11:38), 20 (07-12 @ 05:45)  BUN: 18 (07-14 @ 22:30), 21 (07-14 @ 21:11), 19 (07-14 @ 12:29), 16 (07-14 @ 04:02), 15 (07-13 @ 18:19), 14 (07-13 @ 09:31), 13 (07-13 @ 04:58), 15 (07-12 @ 22:31), 19 (07-12 @ 11:38), 20 (07-12 @ 05:45)  Cr: 0.54 (07-14 @ 22:30), 0.51 (07-14 @ 21:11), 0.65 (07-14 @ 12:29), 0.59 (07-14 @ 04:02), 0.70 (07-13 @ 18:19), 0.57 (07-13 @ 09:31), 0.64 (07-13 @ 04:58), 0.90 (07-12 @ 22:31), 0.56 (07-12 @ 11:38), 0.60 (07-12 @ 05:45)  Glu: 239(07-14 @ 22:30), 206(07-14 @ 21:11), 137(07-14 @ 12:29), 133(07-14 @ 04:02), 162(07-13 @ 18:19), 184(07-13 @ 09:31), 171(07-13 @ 04:58), 186(07-12 @ 22:31), 212(07-12 @ 11:38), 165(07-12 @ 05:45)      MEDICATIONS:  acetaminophen     Tablet .. 975 milliGRAM(s) Oral every 6 hours PRN  artificial tears (preservative free) Ophthalmic Solution 1 Drop(s) Both EYES five times a day PRN  atorvastatin 20 milliGRAM(s) Oral at bedtime  bisacodyl 5 milliGRAM(s) Oral daily PRN  bisacodyl 5 milliGRAM(s) Oral at bedtime  chlorhexidine 4% Liquid 1 Application(s) Topical <User Schedule>  dexAMETHasone     Tablet   Oral   dexAMETHasone     Tablet 4 milliGRAM(s) Oral every 12 hours  enoxaparin Injectable 40 milliGRAM(s) SubCutaneous <User Schedule>  famotidine    Tablet 20 milliGRAM(s) Oral every 12 hours  insulin lispro (ADMELOG) corrective regimen sliding scale   SubCutaneous Before meals and at bedtime  ondansetron Injectable 4 milliGRAM(s) IV Push every 6 hours PRN  polyethylene glycol 3350 17 Gram(s) Oral two times a day  senna 2 Tablet(s) Oral at bedtime  sodium chloride 0.65% Nasal 2 Spray(s) Both Nostrils four times a day    EXAMINATION:  please see exam from daytime     Assessment/Plan: 45 yo woman s/p TSP for resection of pituitary adenoma on 7/11.     No change to plan from daytime.    Sweta Hurtado  Neurocritical Care Attending

## 2024-07-15 NOTE — PROGRESS NOTE ADULT - REASON FOR ADMISSION
Transsphenoidal resection of pituitary tumor
Transsphenoidal resection of pituitary tumor
44F English speaking ([facility] xfer) p/f progressive HAs/L eye blurry vision/short term mem loss x1mo, w/ acutely worse Lt side HA x2d. At 11AM yesterday suddenly could only see figures/shadows from Lt eye. Optho noted Lt optic nn. dysfxn/no papilledema. 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 Exam: Wide awake, Ox3, pupils dilated by optho, VFF to finger counting b/l but Lt eye can only see shadows/figure outlines, no drift o/w intact
Transsphenoidal resection of pituitary tumor

## 2024-07-15 NOTE — PROGRESS NOTE ADULT - ASSESSMENT
DI watch (stalk manipulation) - Monitor I&O  BMP q 8hr  Endo following   Skullbase precautions   HOB 30 deg

## 2024-07-15 NOTE — DISCHARGE NOTE PROVIDER - NSDCCPCAREPLAN_GEN_ALL_CORE_FT
PRINCIPAL DISCHARGE DIAGNOSIS  Diagnosis: Pituitary adenoma  Assessment and Plan of Treatment: 7/11 s/p Endoscopic endonasal transsphenoidal approach for pit adenoma  Please make an appointment and follow up Dr. Goncalves (Neurosurgery) in 1-2 weeks after discharge from the hospital. Please make an appointment and follow up with Dr. De Santiago (ENT) in 1-2 weeks after discharge from the hospital. Please observe skullbase precautions: no straws, no nasal swabbing, no nose blowing, avoid nasal trauma. Please continue nasal saline spray on both nares 4x a day. You are being discharged on Decadron (steroids). Decadron helps with swelling.  Some common side effects of decadron include increased appetite, irritability, difficulty sleeping, swelling in your ankles, heartburn, and increased sugars.  Please notify your doctor of any side effects. Please follow Decadron taper: 2mg twice a day until the evening of 7/18, and on 7/19 take 2mg once then stop. Once you finish your steroid taper please have your Endocrinologist Dr. Espino check your AM cortisol. Please monitor for signs & symptoms of adrenal insufficiency: nausea / vomiting, abdominal pain and dizziness. Please come to the emergency room if these symptoms occur. Please make an appointment and follow up with Dr. Espino in 1 week after discharge from the hospital. Please DO NOT take any NSAIDs, (Advil, Aleve, Motrin, Ibuprofen) as these medications can increase your risk of bleeding after procedure.      SECONDARY DISCHARGE DIAGNOSES  Diagnosis: Epigastric pain  Assessment and Plan of Treatment: You were seen by Gastroentorology team for epigastric pain - it is likely erosive gastropathy in the setting of steroids. Please continue to take Protonix 40mg twice a day for 2 weeks then daily after. DO NOT TAKE ANY NSAIDs (Advil, Aleve, Motrin, Ibuprofen). You had an US RUQ Abd which was normal. You were found to have elevated Amylase and Lipase, please make an appointment and follow up with Gatroentrology in 1-2 weeks after discharge fromt the hospital.    Diagnosis: Diabetes insipidus  Assessment and Plan of Treatment: You were monitored for diabetes insipidus, you were treated with DDAVP PO one time on 7/13. Your sodium and urine output has since been stable. Please make an appointment and follow up with Dr. Espino in 1 week to make sure your sodium levels are monitored.    Diagnosis: Thyroid goiter  Assessment and Plan of Treatment: Please make an appointment and follow up with Dr. Espino in 1 week for continued monitoring of thyroid goiter.    Diagnosis: Hyperlipidemia  Assessment and Plan of Treatment: Please continue Lipitor as prescribed and please make an appointment with your primary care provider in 1-2 weeks after discharge.    Diagnosis: Foreign body sensation, right eye  Assessment and Plan of Treatment: DDx for FBS includes dry eye vs foreign body vs pterygium. Less likely foreign body as no evidence of eye irritation or corneal abrasion or ulcer noted. May also be dryness related to white limbal lesion with inadequate tear film distribution. Etiology of white limbal lesion unclear at this time. May be suggestive of pterygium vs JOSE vs papilloma vs Dry eye. Patient likely Low concern for corneal ulcer at this time.  Continue preservative free artificial tears 4-6x per day in both eyes and Lacrilube at bedtime in both eyes. Please make an appointment with Ophthalmology in 1 week to assess for size of lesion:   [facility] Department of Ophthalmology  [address] [place] [address]. [address]  [place] [address]     PRINCIPAL DISCHARGE DIAGNOSIS  Diagnosis: Pituitary adenoma  Assessment and Plan of Treatment: 7/11 s/p Endoscopic endonasal transsphenoidal approach for pit adenoma  Please make an appointment and follow up Dr. Goncalves (Neurosurgery) in 1-2 weeks after discharge from the hospital. Please make an appointment and follow up with Dr. De Santiago (ENT) in 1-2 weeks after discharge from the hospital. Please observe skullbase precautions: no straws, no nasal swabbing, no nose blowing, avoid nasal trauma. Please continue nasal saline spray on both nares 4x a day. You are being discharged on Decadron (steroids). Decadron helps with swelling.  Some common side effects of decadron include increased appetite, irritability, difficulty sleeping, swelling in your ankles, heartburn, and increased sugars.  Please notify your doctor of any side effects. Please follow Decadron taper: 2mg twice a day until the evening of 7/18, and on 7/19 take 2mg once then stop. Once you finish your steroid taper please have your Endocrinologist Dr. Espino check your AM cortisol. Please monitor for signs & symptoms of adrenal insufficiency: nausea / vomiting, abdominal pain and dizziness. Please come to the emergency room if these symptoms occur. Please make an appointment and follow up with Dr. Espino in 1 week after discharge from the hospital. Please DO NOT take any NSAIDs, (Advil, Aleve, Motrin, Ibuprofen) as these medications can increase your risk of bleeding after procedure.      SECONDARY DISCHARGE DIAGNOSES  Diagnosis: Epigastric pain  Assessment and Plan of Treatment: You were seen by Gastroentorology team for epigastric pain - it is likely erosive gastropathy in the setting of steroids. Please continue to take Protonix 40mg twice a day for 2 weeks then daily after. DO NOT TAKE ANY NSAIDs (Advil, Aleve, Motrin, Ibuprofen). You had an US RUQ Abd which was normal. You were found to have elevated Amylase and Lipase, please make an appointment and follow up with Gatroentrology in 1-2 weeks after discharge fromt the hospital. Please call: Gastroenterology Clinic; [phone] ([facility] at [facility]) or [phone] ([facility] at [address] [place] [address]) or [phone] ([facility] at [address] [place] [address]).    Diagnosis: Diabetes insipidus  Assessment and Plan of Treatment: You were monitored for diabetes insipidus, you were treated with DDAVP PO one time on 7/13. Your sodium and urine output has since been stable. Please make an appointment and follow up with Dr. Espino in 1 week to make sure your sodium levels are monitored.    Diagnosis: Thyroid goiter  Assessment and Plan of Treatment: Please make an appointment and follow up with Dr. Espino in 1 week for continued monitoring of thyroid goiter.    Diagnosis: Hyperlipidemia  Assessment and Plan of Treatment: Please continue Lipitor as prescribed and please make an appointment with your primary care provider in 1-2 weeks after discharge.    Diagnosis: Foreign body sensation, right eye  Assessment and Plan of Treatment: DDx for FBS includes dry eye vs foreign body vs pterygium. Less likely foreign body as no evidence of eye irritation or corneal abrasion or ulcer noted. May also be dryness related to white limbal lesion with inadequate tear film distribution. Etiology of white limbal lesion unclear at this time. May be suggestive of pterygium vs JOSE vs papilloma vs Dry eye. Patient likely Low concern for corneal ulcer at this time.  Continue preservative free artificial tears 4-6x per day in both eyes and Lacrilube at bedtime in both eyes. Please make an appointment with Ophthalmology in 1 week to assess for size of lesion:   [facility] Department of Ophthalmology  [address] [place] [address]. [address]  [place] [address]     PRINCIPAL DISCHARGE DIAGNOSIS  Diagnosis: Pituitary adenoma  Assessment and Plan of Treatment: 7/11 s/p Endoscopic endonasal transsphenoidal approach for pit adenoma  Please make an appointment and follow up Dr. Goncalves (Neurosurgery) in 1-2 weeks after discharge from the hospital. Please make an appointment and follow up with Dr. De Santiago (ENT) in 1-2 weeks after discharge from the hospital. Please observe skullbase precautions: no straws, no nasal swabbing, no nose blowing, avoid nasal trauma. Please continue nasal saline spray on both nares 4x a day. You are being discharged on Decadron (steroids). Decadron helps with swelling.  Some common side effects of decadron include increased appetite, irritability, difficulty sleeping, swelling in your ankles, heartburn, and increased sugars.  Please notify your doctor of any side effects. Please follow Decadron taper: 2mg twice a day until the evening of 7/18, and on 7/19 take 2mg once then stop. Once you finish your steroid taper please have your Endocrinologist Dr. Espino check your AM cortisol. Please monitor for signs & symptoms of adrenal insufficiency: nausea / vomiting, abdominal pain and dizziness. Please come to the emergency room if these symptoms occur. Please follow up with Dr. Espino in their office on 7/22/24. Please DO NOT take any NSAIDs, (Advil, Aleve, Motrin, Ibuprofen) as these medications can increase your risk of bleeding after procedure.      SECONDARY DISCHARGE DIAGNOSES  Diagnosis: Epigastric pain  Assessment and Plan of Treatment: You were seen by Gastroentorology team for epigastric pain - it is likely erosive gastropathy in the setting of steroids. Please continue to take Protonix 40mg twice a day for 2 weeks then daily after. DO NOT TAKE ANY NSAIDs (Advil, Aleve, Motrin, Ibuprofen). You had an US RUQ Abd which was normal. You were found to have elevated Amylase and Lipase, please make an appointment and follow up with Gatroentrology in 1-2 weeks after discharge fromt the hospital. Please call: Gastroenterology Clinic; [phone] ([facility] at [address] [place] [address]) or [phone] ([facility] at [address] [place] [address]) or [phone] ([facility] at [address] [place] [address]).    Diagnosis: Diabetes insipidus  Assessment and Plan of Treatment: You were monitored for diabetes insipidus, you were treated with DDAVP PO one time on 7/13. Your sodium and urine output has since been stable. Please follow up with Dr. Espino at their office on 7/22/24 Monday so your sodium levels are monitored.    Diagnosis: Thyroid goiter  Assessment and Plan of Treatment: Please follow up with Dr. Espino at their office on Monday 7/22/24 for continued monitoring of thyroid goiter.    Diagnosis: Hyperlipidemia  Assessment and Plan of Treatment: Please continue Lipitor as prescribed and please make an appointment with your primary care provider in 1-2 weeks after discharge.    Diagnosis: Foreign body sensation, right eye  Assessment and Plan of Treatment: DDx for FBS includes dry eye vs foreign body vs pterygium. Less likely foreign body as no evidence of eye irritation or corneal abrasion or ulcer noted. May also be dryness related to white limbal lesion with inadequate tear film distribution. Etiology of white limbal lesion unclear at this time. May be suggestive of pterygium vs JOSE vs papilloma vs Dry eye. Patient likely Low concern for corneal ulcer at this time.  Continue preservative free artificial tears 4-6x per day in both eyes and Lacrilube at bedtime in both eyes. Please make an appointment with Ophthalmology in 1 week to assess for size of lesion:   [facility] Department of Ophthalmology  [address] [place] [address]. [address]  [place] [address]     PRINCIPAL DISCHARGE DIAGNOSIS  Diagnosis: Pituitary adenoma  Assessment and Plan of Treatment: 7/11 s/p Endoscopic endonasal transsphenoidal approach for pit adenoma  Please make an appointment and follow up Dr. Goncalves (Neurosurgery) in 1-2 weeks after discharge from the hospital. Please make an appointment and follow up with Dr. De Santiago (ENT) in 1-2 weeks after discharge from the hospital. Please observe skullbase precautions: no straws, no nasal swabbing, no nose blowing, avoid nasal trauma. Please continue nasal saline spray on both nares 4x a day. You are being discharged on Decadron (steroids). Decadron helps with swelling.  Some common side effects of decadron include increased appetite, irritability, difficulty sleeping, swelling in your ankles, heartburn, and increased sugars.  Please notify your doctor of any side effects. Please follow Decadron taper: 2mg twice a day until the evening of 7/18, and on 7/19 take 2mg once then stop. Once you finish your steroid taper please have your Endocrinologist Dr. Espino check your AM cortisol. Please monitor for signs & symptoms of adrenal insufficiency: nausea / vomiting, abdominal pain and dizziness. Please come to the emergency room if these symptoms occur. Please follow up with Dr. Espino in their office on 7/22/24. Please DO NOT take any NSAIDs, (Advil, Aleve, Motrin, Ibuprofen) as these medications can increase your risk of bleeding after procedure.      SECONDARY DISCHARGE DIAGNOSES  Diagnosis: Epigastric pain  Assessment and Plan of Treatment: You were seen by Gastroentorology team for epigastric pain - it is likely erosive gastropathy in the setting of steroids. Please continue to take Protonix 40mg twice a day for 2 weeks then daily after. DO NOT TAKE ANY NSAIDs (Advil, Aleve, Motrin, Ibuprofen). You had an US RUQ Abd which was normal. You were found to have elevated Amylase and Lipase, please make an appointment and follow up with Gatroentrology in 1-2 weeks after discharge fromt the hospital. Please call: Gastroenterology Clinic; [phone] ([facility] at [address] [place] [address]) or [phone] ([facility] at [address] [place] [address]) or [phone] ([facility] at [address] [place] [address]).    Diagnosis: Diabetes insipidus  Assessment and Plan of Treatment: You were monitored for diabetes insipidus, you were treated with DDAVP PO one time on 7/13. Your sodium and urine output has since been stable. Your last sodium was 141, serum osmolality was 295 and urine specific gravity was 1.009 and urine osmolality was 348. Please follow up with Dr. Espino at their office on 7/22/24 Monday so your sodium levels are monitored.    Diagnosis: Thyroid goiter  Assessment and Plan of Treatment: Please follow up with Dr. Espino at their office on Monday 7/22/24 for continued monitoring of thyroid goiter.    Diagnosis: Hyperlipidemia  Assessment and Plan of Treatment: Please continue Lipitor as prescribed and please make an appointment with your primary care provider in 1-2 weeks after discharge.    Diagnosis: Foreign body sensation, right eye  Assessment and Plan of Treatment: DDx for FBS includes dry eye vs foreign body vs pterygium. Less likely foreign body as no evidence of eye irritation or corneal abrasion or ulcer noted. May also be dryness related to white limbal lesion with inadequate tear film distribution. Etiology of white limbal lesion unclear at this time. May be suggestive of pterygium vs JOSE vs papilloma vs Dry eye. Patient likely Low concern for corneal ulcer at this time.  Continue preservative free artificial tears 4-6x per day in both eyes and Lacrilube at bedtime in both eyes. Please make an appointment with Ophthalmology in 1 week to assess for size of lesion:   [facility] Department of Ophthalmology  [address] [place] [address]. [address]  [place] [address]     PRINCIPAL DISCHARGE DIAGNOSIS  Diagnosis: Pituitary adenoma  Assessment and Plan of Treatment: 7/11 s/p Endoscopic endonasal transsphenoidal approach for pit adenoma  Please make an appointment and follow up Dr. Goncalves (Neurosurgery) in 1-2 weeks after discharge from the hospital. Please make an appointment and follow up with Dr. De Santiago (ENT) in 1-2 weeks after discharge from the hospital. Please observe skullbase precautions: no straws, no nasal swabbing, no nose blowing, avoid nasal trauma. Please continue nasal saline spray on both nares 4x a day. You are being discharged on Decadron (steroids). Decadron helps with swelling.  Some common side effects of decadron include increased appetite, irritability, difficulty sleeping, swelling in your ankles, heartburn, and increased sugars.  Please notify your doctor of any side effects. Please follow Decadron taper: 2mg twice a day until the evening of 7/18, and on 7/19 take 2mg daily, then on 7/20 and 7/21 take 1mg daily. Once you finish your steroid taper please have your Endocrinologist Dr. Espino check your AM cortisol. Please monitor for signs & symptoms of adrenal insufficiency: nausea / vomiting, abdominal pain and dizziness. Please contact Dr. Espino immediately or come to the emergency room if these symptoms occur. Please follow up with Dr. Espino in their office on 7/22/24 - IT IS VERY IMPORTANT THAT YOU BE AT THIS APPOINTMENT. You can take Tylenol (Acetaminophen) as needed for pain control. Please DO NOT take any NSAIDs, (Advil, Aleve, Motrin, Ibuprofen) as these medications can increase your risk of bleeding after procedure.      SECONDARY DISCHARGE DIAGNOSES  Diagnosis: Epigastric pain  Assessment and Plan of Treatment: You were seen by Gastroentorology team for epigastric pain - it is likely erosive gastropathy in the setting of steroids. Please continue to take Protonix 40mg twice a day for 2 weeks then daily after. DO NOT TAKE ANY NSAIDs (Advil, Aleve, Motrin, Ibuprofen). You had an US RUQ Abd which was normal. You were found to have elevated Amylase and Lipase, please make an appointment and follow up with Gatroentrology in 1-2 weeks after discharge fromt the hospital. Please call: Gastroenterology Clinic; [phone] ([facility] at [address] [place] [address]) or [phone] ([facility] at [address] [place] [address]) or [phone] ([facility] at [address] [facility]).    Diagnosis: Diabetes insipidus  Assessment and Plan of Treatment: You were monitored for diabetes insipidus, you were treated with DDAVP PO one time on 7/13. Your sodium and urine output has since been stable. Your last sodium was 141, serum osmolality was 295 and urine specific gravity was 1.009 and urine osmolality was 348. Please follow up with Dr. Espino at their office on 7/22/24 Monday so your sodium levels are monitored.    Diagnosis: Thyroid goiter  Assessment and Plan of Treatment: Please follow up with Dr. Espino at their office on Monday 7/22/24 for continued monitoring of thyroid goiter.    Diagnosis: Hyperlipidemia  Assessment and Plan of Treatment: Please continue Lipitor as prescribed and please make an appointment with your primary care provider in 1-2 weeks after discharge.    Diagnosis: Foreign body sensation, right eye  Assessment and Plan of Treatment: DDx for FBS includes dry eye vs foreign body vs pterygium. Less likely foreign body as no evidence of eye irritation or corneal abrasion or ulcer noted. May also be dryness related to white limbal lesion with inadequate tear film distribution. Etiology of white limbal lesion unclear at this time. May be suggestive of pterygium vs JOSE vs papilloma vs Dry eye. Patient likely Low concern for corneal ulcer at this time.  Continue preservative free artificial tears 4-6x per day in both eyes and Lacrilube at bedtime in both eyes. Please make an appointment with Ophthalmology in 1 week to assess for size of lesion:   [facility] Department of Ophthalmology  [address] [place] [address]. [address]  [place] [address]  [phone]

## 2024-07-15 NOTE — PROGRESS NOTE ADULT - SUBJECTIVE AND OBJECTIVE BOX
Patient seen and examined at bedside.    --Anticoagulation--  enoxaparin Injectable 40 milliGRAM(s) SubCutaneous <User Schedule>    T(C): 36.5 (07-14-24 @ 19:00), Max: 36.7 (07-14-24 @ 03:00)  HR: 79 (07-14-24 @ 23:00) (65 - 84)  BP: 111/64 (07-14-24 @ 23:00) (111/64 - 126/81)  RR: 20 (07-14-24 @ 23:00) (12 - 20)  SpO2: 97% (07-14-24 @ 23:00) (95% - 97%)  Wt(kg): --    Exam: AOx3, PERRL, EOMI, left eye Lt field cut improved from preop but can finger count, Rt eye intact, no facial, no drift, BHAGAT 5/5, SILT

## 2024-07-15 NOTE — PROGRESS NOTE ADULT - ATTENDING COMMENTS
s/p resection of pituitary adenoma.  continue to monitor urine output for evidence of DI.  can transfer from NSCU to floor when bed available.

## 2024-07-15 NOTE — DISCHARGE NOTE PROVIDER - NSDCFUADDAPPT_GEN_ALL_CORE_FT
Please make an appointment and follow up with the following people in 1 week:    1. Dr. Espino (Endocrinologist)  2. Dr. Rebekah Hayward (Primary Care Provider)  3. [facility] Department of Ophthalmology  [address] [place] [address]. [address]  [place] [address]   Please make an appointment and follow up with the following people in 1 week:    1. Dr. Espino (Endocrinologist)  2. Dr. Rebekah Hayward (Primary Care Provider)  3. [facility] Department of Ophthalmology  [address] [place] [address]. [address]  [place] [address]  4. Gastroenterology Clinic number to confirm/arrange appointment; [phone] ([facility] at [address] [place] [address]) or [phone] ([facility] at [address] [place] [address]) or [phone] ([facility] at [address] [place] [address]).    Please make an appointment and follow up with the following people in 1 week:    1. Dr. Espino (Endocrinologist) - you have an appointment with them on 7/22/24 Monday - it is VERY IMPORTANT to come to this appointment.   2. Dr. Rebekah Hayward (Primary Care Provider)  3. [facility] Department of Ophthalmology  [address] [place] [address]. [address]  [place] [address]  [phone]  4. Gastroenterology Clinic number to confirm/arrange appointment; [phone] ([facility] at [address] [place] [address]) or [phone] ([facility] at [address] [place] [address]) or [phone] ([facility] at [address] [place] [address]).

## 2024-07-16 DIAGNOSIS — R10.13 EPIGASTRIC PAIN: ICD-10-CM

## 2024-07-16 DIAGNOSIS — E23.2 DIABETES INSIPIDUS: ICD-10-CM

## 2024-07-16 LAB
ADD ON TEST-SPECIMEN IN LAB: SIGNIFICANT CHANGE UP
ALBUMIN SERPL ELPH-MCNC: 3.2 G/DL — LOW (ref 3.3–5)
ALP SERPL-CCNC: 80 U/L — SIGNIFICANT CHANGE UP (ref 40–120)
ALT FLD-CCNC: 101 U/L — HIGH (ref 10–45)
AMYLASE P1 CFR SERPL: 169 U/L — HIGH (ref 25–125)
ANION GAP SERPL CALC-SCNC: 16 MMOL/L — SIGNIFICANT CHANGE UP (ref 5–17)
AST SERPL-CCNC: 48 U/L — HIGH (ref 10–40)
BILIRUB DIRECT SERPL-MCNC: <0.1 MG/DL — SIGNIFICANT CHANGE UP (ref 0–0.3)
BILIRUB INDIRECT FLD-MCNC: >0.6 MG/DL — SIGNIFICANT CHANGE UP (ref 0.2–1)
BILIRUB SERPL-MCNC: 0.7 MG/DL — SIGNIFICANT CHANGE UP (ref 0.2–1.2)
BUN SERPL-MCNC: 20 MG/DL — SIGNIFICANT CHANGE UP (ref 7–23)
CALCIUM SERPL-MCNC: 9.4 MG/DL — SIGNIFICANT CHANGE UP (ref 8.4–10.5)
CHLORIDE SERPL-SCNC: 105 MMOL/L — SIGNIFICANT CHANGE UP (ref 96–108)
CK SERPL-CCNC: 62 U/L — SIGNIFICANT CHANGE UP (ref 25–170)
CO2 SERPL-SCNC: 20 MMOL/L — LOW (ref 22–31)
CREAT SERPL-MCNC: 0.63 MG/DL — SIGNIFICANT CHANGE UP (ref 0.5–1.3)
EGFR: 111 ML/MIN/1.73M2 — SIGNIFICANT CHANGE UP
GLUCOSE BLDC GLUCOMTR-MCNC: 101 MG/DL — HIGH (ref 70–99)
GLUCOSE BLDC GLUCOMTR-MCNC: 128 MG/DL — HIGH (ref 70–99)
GLUCOSE BLDC GLUCOMTR-MCNC: 145 MG/DL — HIGH (ref 70–99)
GLUCOSE BLDC GLUCOMTR-MCNC: 95 MG/DL — SIGNIFICANT CHANGE UP (ref 70–99)
GLUCOSE SERPL-MCNC: 89 MG/DL — SIGNIFICANT CHANGE UP (ref 70–99)
HCT VFR BLD CALC: 37.3 % — SIGNIFICANT CHANGE UP (ref 34.5–45)
HGB BLD-MCNC: 12 G/DL — SIGNIFICANT CHANGE UP (ref 11.5–15.5)
LIDOCAIN IGE QN: 187 U/L — HIGH (ref 7–60)
MCHC RBC-ENTMCNC: 29.8 PG — SIGNIFICANT CHANGE UP (ref 27–34)
MCHC RBC-ENTMCNC: 32.2 GM/DL — SIGNIFICANT CHANGE UP (ref 32–36)
MCV RBC AUTO: 92.6 FL — SIGNIFICANT CHANGE UP (ref 80–100)
NRBC # BLD: 0 /100 WBCS — SIGNIFICANT CHANGE UP (ref 0–0)
OSMOLALITY SERPL: 299 MOSMOL/KG — SIGNIFICANT CHANGE UP (ref 275–300)
OSMOLALITY UR: 393 MOS/KG — SIGNIFICANT CHANGE UP (ref 300–900)
PLATELET # BLD AUTO: 227 K/UL — SIGNIFICANT CHANGE UP (ref 150–400)
POTASSIUM SERPL-MCNC: 4.4 MMOL/L — SIGNIFICANT CHANGE UP (ref 3.5–5.3)
POTASSIUM SERPL-SCNC: 4.4 MMOL/L — SIGNIFICANT CHANGE UP (ref 3.5–5.3)
PROT SERPL-MCNC: 6.2 G/DL — SIGNIFICANT CHANGE UP (ref 6–8.3)
RBC # BLD: 4.03 M/UL — SIGNIFICANT CHANGE UP (ref 3.8–5.2)
RBC # FLD: 14.3 % — SIGNIFICANT CHANGE UP (ref 10.3–14.5)
SODIUM SERPL-SCNC: 141 MMOL/L — SIGNIFICANT CHANGE UP (ref 135–145)
SP GR UR STRIP: 1.01 — SIGNIFICANT CHANGE UP (ref 1–1.03)
TROPONIN T, HIGH SENSITIVITY RESULT: <6 NG/L — SIGNIFICANT CHANGE UP (ref 0–51)
WBC # BLD: 13.55 K/UL — HIGH (ref 3.8–10.5)
WBC # FLD AUTO: 13.55 K/UL — HIGH (ref 3.8–10.5)

## 2024-07-16 PROCEDURE — 99222 1ST HOSP IP/OBS MODERATE 55: CPT

## 2024-07-16 PROCEDURE — 99232 SBSQ HOSP IP/OBS MODERATE 35: CPT

## 2024-07-16 PROCEDURE — 99233 SBSQ HOSP IP/OBS HIGH 50: CPT

## 2024-07-16 PROCEDURE — 76705 ECHO EXAM OF ABDOMEN: CPT | Mod: 26

## 2024-07-16 RX ORDER — POLYVINYL ALCOHOL, POVIDONE .5; .6 G/100ML; G/100ML
1 SOLUTION OPHTHALMIC AT BEDTIME
Refills: 0 | Status: DISCONTINUED | OUTPATIENT
Start: 2024-07-16 | End: 2024-07-17

## 2024-07-16 RX ORDER — DEXTROSE MONOHYDRATE AND SODIUM CHLORIDE 5; .3 G/100ML; G/100ML
1000 INJECTION, SOLUTION INTRAVENOUS
Refills: 0 | Status: DISCONTINUED | OUTPATIENT
Start: 2024-07-16 | End: 2024-07-16

## 2024-07-16 RX ORDER — DEXTROSE MONOHYDRATE AND SODIUM CHLORIDE 5; .3 G/100ML; G/100ML
500 INJECTION, SOLUTION INTRAVENOUS ONCE
Refills: 0 | Status: COMPLETED | OUTPATIENT
Start: 2024-07-16 | End: 2024-07-16

## 2024-07-16 RX ORDER — MAGNESIUM, ALUMINUM HYDROXIDE 400-400
30 TABLET,CHEWABLE ORAL EVERY 8 HOURS
Refills: 0 | Status: COMPLETED | OUTPATIENT
Start: 2024-07-16 | End: 2024-07-17

## 2024-07-16 RX ORDER — CALCIUM CARBONATE 500(1250)
1 TABLET,CHEWABLE ORAL EVERY 6 HOURS
Refills: 0 | Status: DISCONTINUED | OUTPATIENT
Start: 2024-07-16 | End: 2024-07-17

## 2024-07-16 RX ORDER — PANTOPRAZOLE SODIUM 40 MG/10ML
40 INJECTION, POWDER, FOR SOLUTION INTRAVENOUS EVERY 12 HOURS
Refills: 0 | Status: DISCONTINUED | OUTPATIENT
Start: 2024-07-16 | End: 2024-07-17

## 2024-07-16 RX ORDER — ACETAMINOPHEN 325 MG
1000 TABLET ORAL ONCE
Refills: 0 | Status: COMPLETED | OUTPATIENT
Start: 2024-07-16 | End: 2024-07-16

## 2024-07-16 RX ORDER — DEXTROSE MONOHYDRATE AND SODIUM CHLORIDE 5; .3 G/100ML; G/100ML
1000 INJECTION, SOLUTION INTRAVENOUS
Refills: 0 | Status: DISCONTINUED | OUTPATIENT
Start: 2024-07-16 | End: 2024-07-17

## 2024-07-16 RX ADMIN — Medication 30 MILLILITER(S): at 11:20

## 2024-07-16 RX ADMIN — MAGNESIUM CITRATE 296 MILLILITER(S): 1.75 LIQUID ORAL at 11:20

## 2024-07-16 RX ADMIN — ATORVASTATIN CALCIUM 20 MILLIGRAM(S): 20 TABLET, FILM COATED ORAL at 21:46

## 2024-07-16 RX ADMIN — PANTOPRAZOLE SODIUM 40 MILLIGRAM(S): 40 INJECTION, POWDER, FOR SOLUTION INTRAVENOUS at 06:12

## 2024-07-16 RX ADMIN — DEXTROSE MONOHYDRATE AND SODIUM CHLORIDE 2000 MILLILITER(S): 5; .3 INJECTION, SOLUTION INTRAVENOUS at 14:51

## 2024-07-16 RX ADMIN — Medication 1000 MILLIGRAM(S): at 03:10

## 2024-07-16 RX ADMIN — Medication 400 MILLIGRAM(S): at 22:02

## 2024-07-16 RX ADMIN — DEXTROSE MONOHYDRATE AND SODIUM CHLORIDE 75 MILLILITER(S): 5; .3 INJECTION, SOLUTION INTRAVENOUS at 21:48

## 2024-07-16 RX ADMIN — POLYVINYL ALCOHOL, POVIDONE 1 DROP(S): .5; .6 SOLUTION OPHTHALMIC at 18:04

## 2024-07-16 RX ADMIN — Medication 2 SPRAY(S): at 11:20

## 2024-07-16 RX ADMIN — DEXTROSE MONOHYDRATE AND SODIUM CHLORIDE 75 MILLILITER(S): 5; .3 INJECTION, SOLUTION INTRAVENOUS at 17:08

## 2024-07-16 RX ADMIN — Medication 30 MILLILITER(S): at 21:46

## 2024-07-16 RX ADMIN — Medication 2 SPRAY(S): at 17:58

## 2024-07-16 RX ADMIN — DEXAMETHASONE 2 MILLIGRAM(S): 1 TABLET ORAL at 17:57

## 2024-07-16 RX ADMIN — Medication 400 MILLIGRAM(S): at 02:40

## 2024-07-16 RX ADMIN — DEXAMETHASONE 4 MILLIGRAM(S): 1 TABLET ORAL at 06:12

## 2024-07-16 RX ADMIN — PANTOPRAZOLE SODIUM 40 MILLIGRAM(S): 40 INJECTION, POWDER, FOR SOLUTION INTRAVENOUS at 17:57

## 2024-07-16 NOTE — PROGRESS NOTE ADULT - SUBJECTIVE AND OBJECTIVE BOX
Nate Tadeo   contact via pager or TEAMS        CC: Patient is a 44y old  Female who presents with a chief complaint of TSP  (15 Jul 2024 08:10)      SUBJECTIVE / OVERNIGHT EVENTS:    MEDICATIONS  (STANDING):  atorvastatin 20 milliGRAM(s) Oral at bedtime  bisacodyl 5 milliGRAM(s) Oral at bedtime  dexAMETHasone     Tablet 2 milliGRAM(s) Oral every 12 hours  dexAMETHasone     Tablet   Oral   enoxaparin Injectable 40 milliGRAM(s) SubCutaneous <User Schedule>  famotidine    Tablet 20 milliGRAM(s) Oral every 12 hours  insulin lispro (ADMELOG) corrective regimen sliding scale   SubCutaneous Before meals and at bedtime  magnesium citrate Oral Solution 296 milliLiter(s) Oral once  pantoprazole    Tablet 40 milliGRAM(s) Oral before breakfast  polyethylene glycol 3350 17 Gram(s) Oral two times a day  senna 2 Tablet(s) Oral at bedtime  sodium chloride 0.65% Nasal 2 Spray(s) Both Nostrils four times a day    MEDICATIONS  (PRN):  acetaminophen     Tablet .. 975 milliGRAM(s) Oral every 6 hours PRN Temp greater or equal to 38C (100.4F), Moderate Pain (4 - 6)  artificial tears (preservative free) Ophthalmic Solution 1 Drop(s) Both EYES five times a day PRN Dry Eyes  bisacodyl 5 milliGRAM(s) Oral daily PRN Constipation  calcium carbonate    500 mG (Tums) Chewable 1 Tablet(s) Chew every 6 hours PRN Indigestion  ondansetron Injectable 4 milliGRAM(s) IV Push every 6 hours PRN Nausea and/or Vomiting      Vital Signs Last 24 Hrs  T(C): 36.4 (16 Jul 2024 07:00), Max: 36.8 (15 Jul 2024 11:00)  T(F): 97.6 (16 Jul 2024 07:00), Max: 98.2 (15 Jul 2024 11:00)  HR: 78 (16 Jul 2024 07:00) (61 - 85)  BP: 108/72 (16 Jul 2024 07:00) (104/65 - 118/75)  BP(mean): 81 (15 Jul 2024 11:00) (81 - 81)  RR: 18 (16 Jul 2024 07:00) (14 - 18)  SpO2: 98% (16 Jul 2024 07:00) (93% - 98%)  CAPILLARY BLOOD GLUCOSE      POCT Blood Glucose.: 95 mg/dL (16 Jul 2024 07:56)  POCT Blood Glucose.: 120 mg/dL (15 Jul 2024 21:06)  POCT Blood Glucose.: 150 mg/dL (15 Jul 2024 16:10)  POCT Blood Glucose.: 99 mg/dL (15 Jul 2024 11:40)    I&O's Summary    15 Jul 2024 07:01  -  16 Jul 2024 07:00  --------------------------------------------------------  IN: 1230 mL / OUT: 1650 mL / NET: -420 mL    16 Jul 2024 07:01  -  16 Jul 2024 10:28  --------------------------------------------------------  IN: 360 mL / OUT: 800 mL / NET: -440 mL      tele:    PHYSICAL EXAM:    GENERAL: NAD   HEENT: EOMI, PERRL  PULM: Clear to auscultation bilaterally  CV: Regular rate and rhythm; nl S1, S2; No murmurs, rubs, or gallops  ABDOMEN: Soft, Nontender, Nondistended; Bowel sounds present  EXTREMITIES/MSK:  No edema, calf tenderness   PSYCH: AAOx3  NEUROLOGY: non-focal          LABS:                        12.0   13.55 )-----------( 227      ( 16 Jul 2024 07:27 )             37.3     07-16    141  |  105  |  20  ----------------------------<  89  4.4   |  20<L>  |  0.63    Ca    9.4      16 Jul 2024 07:24            Urinalysis Basic - ( 16 Jul 2024 07:24 )    Color: x / Appearance: x / SG: x / pH: x  Gluc: 89 mg/dL / Ketone: x  / Bili: x / Urobili: x   Blood: x / Protein: x / Nitrite: x   Leuk Esterase: x / RBC: x / WBC x   Sq Epi: x / Non Sq Epi: x / Bacteria: x          RADIOLOGY & ADDITIONAL TESTS:    Imaging Personally Reviewed:    Consultant(s) Notes Reviewed:      Care Discussed with Consultants/Other Providers:   Nate Tadeo   contact via pager or TEAMS        CC: Patient is a 44y old  Female who presents with a chief complaint of TSP  (15 Jul 2024 08:10)    pt requests daughter for translation    SUBJECTIVE / OVERNIGHT EVENTS: s/p Transsphenoidal resection of pituitary tumor on 7/11 c/w DI.  pt reports "burning epigastric pain" since surgery, that starts 1-2 hrs after taking dexamethasone c no radiation and improves with milk or crackers. Cant quantify pain but worse since stopping oxy. no f/c/r. no n/v. +bm. no cp/arm pain/shoulder pain/jaw pain. no dyspnea. no diarrhea. Occasional frontal HA since surgery.    MEDICATIONS  (STANDING):  atorvastatin 20 milliGRAM(s) Oral at bedtime  bisacodyl 5 milliGRAM(s) Oral at bedtime  dexAMETHasone     Tablet 2 milliGRAM(s) Oral every 12 hours  dexAMETHasone     Tablet   Oral   enoxaparin Injectable 40 milliGRAM(s) SubCutaneous <User Schedule>  famotidine    Tablet 20 milliGRAM(s) Oral every 12 hours  insulin lispro (ADMELOG) corrective regimen sliding scale   SubCutaneous Before meals and at bedtime  magnesium citrate Oral Solution 296 milliLiter(s) Oral once  pantoprazole    Tablet 40 milliGRAM(s) Oral before breakfast  polyethylene glycol 3350 17 Gram(s) Oral two times a day  senna 2 Tablet(s) Oral at bedtime  sodium chloride 0.65% Nasal 2 Spray(s) Both Nostrils four times a day    MEDICATIONS  (PRN):  acetaminophen     Tablet .. 975 milliGRAM(s) Oral every 6 hours PRN Temp greater or equal to 38C (100.4F), Moderate Pain (4 - 6)  artificial tears (preservative free) Ophthalmic Solution 1 Drop(s) Both EYES five times a day PRN Dry Eyes  bisacodyl 5 milliGRAM(s) Oral daily PRN Constipation  calcium carbonate    500 mG (Tums) Chewable 1 Tablet(s) Chew every 6 hours PRN Indigestion  ondansetron Injectable 4 milliGRAM(s) IV Push every 6 hours PRN Nausea and/or Vomiting      Vital Signs Last 24 Hrs  T(C): 36.4 (16 Jul 2024 07:00), Max: 36.8 (15 Jul 2024 11:00)  T(F): 97.6 (16 Jul 2024 07:00), Max: 98.2 (15 Jul 2024 11:00)  HR: 78 (16 Jul 2024 07:00) (61 - 85)  BP: 108/72 (16 Jul 2024 07:00) (104/65 - 118/75)  BP(mean): 81 (15 Jul 2024 11:00) (81 - 81)  RR: 18 (16 Jul 2024 07:00) (14 - 18)  SpO2: 98% (16 Jul 2024 07:00) (93% - 98%)  CAPILLARY BLOOD GLUCOSE      POCT Blood Glucose.: 95 mg/dL (16 Jul 2024 07:56)  POCT Blood Glucose.: 120 mg/dL (15 Jul 2024 21:06)  POCT Blood Glucose.: 150 mg/dL (15 Jul 2024 16:10)  POCT Blood Glucose.: 99 mg/dL (15 Jul 2024 11:40)    I&O's Summary    15 Jul 2024 07:01  -  16 Jul 2024 07:00  --------------------------------------------------------  IN: 1230 mL / OUT: 1650 mL / NET: -420 mL    16 Jul 2024 07:01  -  16 Jul 2024 10:28  --------------------------------------------------------  IN: 360 mL / OUT: 800 mL / NET: -440 mL          PHYSICAL EXAM:    GENERAL: NAD   HEENT: EOMI, PERRL  PULM: Clear to auscultation bilaterally  CV: Regular rate and rhythm; nl S1, S2; No murmurs, rubs, or gallops  ABDOMEN: Soft, mild epigastric and RUQ tenderness to deep palpation, Nondistended; Bowel sounds present; no guard, rebound  EXTREMITIES/MSK:  No edema, calf tenderness   PSYCH: AAOx3  NEUROLOGY: non-focal          LABS:                        12.0   13.55 )-----------( 227      ( 16 Jul 2024 07:27 )             37.3     07-16    141  |  105  |  20  ----------------------------<  89  4.4   |  20<L>  |  0.63    Ca    9.4      16 Jul 2024 07:24            Urinalysis Basic - ( 16 Jul 2024 07:24 )    Color: x / Appearance: x / SG: x / pH: x  Gluc: 89 mg/dL / Ketone: x  / Bili: x / Urobili: x   Blood: x / Protein: x / Nitrite: x   Leuk Esterase: x / RBC: x / WBC x   Sq Epi: x / Non Sq Epi: x / Bacteria: x    EKG: reviewed by ALICIA VERMA in III.          RADIOLOGY & ADDITIONAL TESTS:    Imaging Personally Reviewed:     Consultant(s) Notes Reviewed:      Care Discussed with Consultants/Other Providers: neurosurg

## 2024-07-16 NOTE — CONSULT NOTE ADULT - ATTENDING COMMENTS
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  Never had prior.  Suspect erosive gastropathy in setting of steroids and elevated lipase can occur as consequence of PUD.  Patient opts for conservative management.  Plan for BID PPI and hold on endoscopy at this time.  If no response, then plan for endoscopy.
Discussed plan with Dr. Michael Cuba    Outpatient work-up   6/11/24 - TTE showed normal LV EF 60%, moderate mitral regurgitation (to evaluate heart murmur) by Dr. Ehsan Sykes  Stress test - reported normal, done 2 weeks ago  Holter monitoring- normal with "occasional irregular beat" "considered normal, nothing to do"  TSH 4 in the past now 3 most recently    Patient is medically optimized for planned procedure.
Pt with sellar mass, likely nonfunctioning.  Check FT4, check IGF1.  prolactin elevation likely due to compression of pituitary stalk, with loss of tonic inhibition of prolactin release.  Repeat pituitary labs pos TSS.  Has thyroid nodules, continue monitoring as outpt.  She should follow up with her endocrinoogist post discharge    Cici Richard MD  On 7/4/24: via Teams or pager    Other times:  Diabetes team: [phone]   or email [email or web address]

## 2024-07-16 NOTE — PROGRESS NOTE ADULT - SUBJECTIVE AND OBJECTIVE BOX
SUBJECTIVE: HPI:  44F Azerbaijani speaking ([facility] xfer) p/f progressive HAs/L eye blurry vision/short term mem loss x1mo, w/ acutely worse Lt side HA x2d. At 11AM yesterday suddenly could only see figures/shadows from Lt eye. Optho noted Lt optic nn. dysfxn/no papilledema. 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 Exam: Wide awake, Ox3, pupils dilated by optho, VFF to finger counting b/l but Lt eye can only see shadows/figure outlines, no drift o/w intact  (04 Jul 2024 02:28)      OVERNIGHT EVENTS: Patient transferred from [facility] to [facility] overnight. Patient seen and evaluated with her daughter at bedside; offered  but patient declined and preferred her daughter to translate. Had epigastric pain overnight that was relived by protonix, EKG done overnight that was WNL. This morning patient reports it again, added LFTs, Amylase and Lipase and US Abd, along with Troponin and CPK per Hospitalist. Patient is aware that she is being monitored for DI, she has been drinking to thirst and has been following instructions on urinating on the urinal hat in ordered to measure her I/Os.     Vital Signs Last 24 Hrs  T(C): 36.6 (16 Jul 2024 16:23), Max: 36.7 (15 Jul 2024 17:02)  T(F): 97.9 (16 Jul 2024 16:23), Max: 98.1 (15 Jul 2024 17:02)  HR: 66 (16 Jul 2024 16:23) (61 - 82)  BP: 112/74 (16 Jul 2024 16:23) (99/72 - 118/75)  BP(mean): --  RR: 18 (16 Jul 2024 16:23) (18 - 18)  SpO2: 97% (16 Jul 2024 16:23) (95% - 98%)    Parameters below as of 16 Jul 2024 16:23  Patient On (Oxygen Delivery Method): room air        PHYSICAL EXAM:    General: No Acute Distress     Neurological: Awake, Ox3, pupils 3mm react b/l, visual fields full though she reports blurriness though she reports overall improved from her pre-op symptoms, following commands, uppers 5/5, no drift, lowers 5/5, SILT    Pulmonary: Clear to Auscultation, No Rales, No Rhonchi, No Wheezes     Cardiovascular: S1, S2, Regular Rate and Rhythm     Gastrointestinal: Soft, Nontender, Nondistended     Incision: no clear fluid leaking from nares    LABS:                        12.0   13.55 )-----------( 227      ( 16 Jul 2024 07:27 )             37.3    07-16    141  |  105  |  20  ----------------------------<  89  4.4   |  20<L>  |  0.63    Ca    9.4      16 Jul 2024 07:24    TPro  6.2  /  Alb  3.2<L>  /  TBili  0.7  /  DBili  <0.1  /  AST  48<H>  /  ALT  101<H>  /  AlkPhos  80  07-16 07-15 @ 07:01 - 07-16 @ 07:00  --------------------------------------------------------  IN: 1230 mL / OUT: 1650 mL / NET: -420 mL    07-16 @ 07:01 - 07-16 @ 16:44  --------------------------------------------------------  IN: 1420 mL / OUT: 1400 mL / NET: 20 mL      DRAINS: None    MEDICATIONS:  Antibiotics:    Neuro:  acetaminophen     Tablet .. 975 milliGRAM(s) Oral every 6 hours PRN Temp greater or equal to 38C (100.4F), Moderate Pain (4 - 6)  ondansetron Injectable 4 milliGRAM(s) IV Push every 6 hours PRN Nausea and/or Vomiting    Cardiac:    Pulm:    GI/:  aluminum hydroxide/magnesium hydroxide/simethicone Suspension 30 milliLiter(s) Oral every 8 hours  bisacodyl 5 milliGRAM(s) Oral daily PRN Constipation  bisacodyl 5 milliGRAM(s) Oral at bedtime  calcium carbonate    500 mG (Tums) Chewable 1 Tablet(s) Chew every 6 hours PRN Indigestion  famotidine    Tablet 20 milliGRAM(s) Oral every 12 hours  pantoprazole    Tablet 40 milliGRAM(s) Oral before breakfast  polyethylene glycol 3350 17 Gram(s) Oral two times a day  senna 2 Tablet(s) Oral at bedtime    Other:   artificial tears (preservative free) Ophthalmic Solution 1 Drop(s) Both EYES five times a day PRN Dry Eyes  atorvastatin 20 milliGRAM(s) Oral at bedtime  dexAMETHasone     Tablet   Oral   dexAMETHasone     Tablet 2 milliGRAM(s) Oral every 12 hours  enoxaparin Injectable 40 milliGRAM(s) SubCutaneous <User Schedule>  insulin lispro (ADMELOG) corrective regimen sliding scale   SubCutaneous Before meals and at bedtime  lactated ringers. 1000 milliLiter(s) IV Continuous <Continuous>  sodium chloride 0.65% Nasal 2 Spray(s) Both Nostrils four times a day    DIET: [] Regular [x] CCD [] Renal [] Puree [] Dysphagia [] Tube Feeds:     IMAGING:   < from: US Abdomen Upper Quadrant Right (07.16.24 @ 14:32) >  IMPRESSION:  Normal right upper quadrant abdominal ultrasound.    --- End of Report ---    TALIB ALTAMIRANO MD; Resident Radiologist  This document has been electronically signed.  SARMAD MO MD; Attending Radiologist  This document has been electronically signed. Jul 16 2024  3:35PM    < end of copied text >    < from: MR Head w/wo IV Cont (07.12.24 @ 15:17) >  IMPRESSION:        1.   Interval resection of pituitary macroadenoma with minimal   residual in the medial LEFT cavernous sinus. Postoperative changes   include hemorrhage and postoperative material. There is minimal mass   effect on the LEFT optic chiasm. Packing material and   hemorrhage/secretions are seen within the sphenoid cavity.        2.   Scattered punctate foci of FLAIR signal hyperintensity in the   bifrontal and biparietal deep white matter again noted. No abnormal   enhancement is seen..    --- End of Report ---    GISSELLE ASKEW MD; Attending Radiologist  This document has been electronically signed. Jul 13 2024 11:18AM    < end of copied text >

## 2024-07-16 NOTE — PROGRESS NOTE ADULT - ASSESSMENT
44F hx of thyroid goiter, HLD presenting for progressive HAs/L eye vision deficit. 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 Endocrine consulted for: Sellar mass, possibly pituitary adenoma    #Sellar mass, possibly pituitary adenoma  - 1.9cm sellar mass with L optic nerve compression with visual deficits with associated elevation in prolactin 153, diluted prolactin 108. Mildly elevated TSH 6.40, FT4 1.0, 2am cortisol 4.0, estradiol 36, LH 7.4, FSH 9.4, . TPO Ab <10  - Suspect that this is likely non-functioning pituitary adenoma given most pituitary hormone levels without significant abnormalities.  - s/p resection 7/11, denies any symptoms post-op  - post op TFTs wnl (7/13/24: TSH 1.0, FT4 1.4), unable to assess AM cortisol since patient was started on dexamethasone 4mg IV q6h since 5am 7/4. Now tapered down to 4mg q12h.  - 7/13/24: Urine output increased to 400cc/hr w/ low urine osm/spec gravity with Na 144 - s/p DDAVP 0.05mg at 12pm, required another dose at on 7/13/24 at 10pm   - thus far patient with 2 doses of DDAVP (7/13/24 12pm and 10pm)  - 7/15/24: UOP improving ~150-200cc/hr  - 7/16/24: UOP stable with periods of increased output. Na 141 (from 144 yesterday)     Recommendations  - c/w DI watch  - Patient tapered to Decadron 2 mg q12. Unable to assess HPA axis while on steroids  - Will obtain AM cortisol once fully tapered off dexamethasone (per NSGY, planning to taper off completely this admission)  - may repeat other pituitary labs when patient as OP: IGF-1, LH, FSH, prolactin    *DI WATCH*:  - Goal Na 140-145  - Please monitor strict I/O, sodium levels q12h, and urine osm q12hrs  - DI is suspected if UOP is >250cc/hr x 2 consecutive hours or if >500cc/hr x 1 hr - if this occurs please check STAT serum Na, urine osm, urine specific gravity  - if specific gravity <1.005/Urine Osm <300 and Na is rising - likely DI, please dose DDAVP 0.05mg PO x1 (or DDAVP 0.5mcg IV if no PO access) and bolus 1/2 NS to match half the output (for example, if net negative 2Liters can give 1Liter 1/2NS if pt is unable to keep up with output with PO intake) - continue DI watch  - If Na continues to increase despite a a DDAVP dose and 1/2NS fluid bolus please inform endocrine   - Please make sure pt has access to water and encourage her to drink to thirst     #Thyroid goiter  She has thyroid goiter for which she follows up with Endocrinologist Dr. Espino ([facility]) since 2021. Only been monitoring with US.   - Follow up outpatient with Dr. Espino    #HLD  - , from 96 in June  - c/w atorva 20 for now      Discussed recommendations with primary team.    D/w Dr. Farris

## 2024-07-16 NOTE — CONSULT NOTE ADULT - ASSESSMENT
44F Singaporean speaking ([facility] xfer) presenting w/ progressive HAs/short term memory loss found to have 1.9cm sellar/suprasellar mass s/p transphenoidal resection w/ post op course c/b DI and epigastric pain for which GI is consulted.     #Pituitary adenoma s/p resection on steroids  #epigastric pain  GI consulted for dyspepsia in patient after transphenoidal resection on steroids. Suspect etiology most likely 2/2 erosive dyspepsia vs. PUD given sxs in setting risk factors (was on steroids) now w/ improvement in sxs after starting PPI. Reassuringly w/ stable Hb and no bleeding.  Recommendations:  -Can increase to PPI BID  -Can trial sucralfate for sxs relief  -Taper steroids as able  -Discussed risks/benefits of endoscopic evaluation including likely limited therapeutic options; she would like to focus on recovery but endoscopic eval can be reconsidered if overt bleeding or if sxs not improving and impeding d/c    Note incomplete until finalized by attending signature/attestation.    Ashli Vieira  GI/Hepatology Fellow PGY5    NON-URGENT CONSULTS:  Please email [email or web address]  AT NIGHT AND ON WEEKENDS:  Available on Microsoft Teams  [phone] ([place] Pager)    For urgent consults, please contact on call GI team. See Amion schedule ([facility]) or [employer] system ([facility])

## 2024-07-16 NOTE — CONSULT NOTE ADULT - SUBJECTIVE AND OBJECTIVE BOX
INITIAL GI CONSULTATION  HPI:  44F Hebrew speaking ([facility] xfer) presenting w/ progressive HAs/short term memory loss found to have 1.9cm sellar/suprasellar mass s/p transphenoidal resection w/ post op course c/b DI and epigastric pain for which GI is consulted.     Hx as above. Underwent transphenoidal resection 7/11. Post op has been receiving steroids. Had onset of epigastric pain described as burning/gnawing after her surgery. Non radiating, improved with po intake. Sxs have improved since starting PPI and when after drinking milk. Today was able to keep down salmon. No similar sxs in past. Denies acid reflux or regurgitation. No AC. No prior endoscopies.               FamHx: ***no h/o GI malignancies known  PMH/PSH:  PAST MEDICAL & SURGICAL HISTORY:  Hyperlipidemia      H/O: hysterectomy          MEDS:  MEDICATIONS  (STANDING):  aluminum hydroxide/magnesium hydroxide/simethicone Suspension 30 milliLiter(s) Oral every 8 hours  atorvastatin 20 milliGRAM(s) Oral at bedtime  bisacodyl 5 milliGRAM(s) Oral at bedtime  dexAMETHasone     Tablet 2 milliGRAM(s) Oral every 12 hours  dexAMETHasone     Tablet   Oral   enoxaparin Injectable 40 milliGRAM(s) SubCutaneous <User Schedule>  famotidine    Tablet 20 milliGRAM(s) Oral every 12 hours  insulin lispro (ADMELOG) corrective regimen sliding scale   SubCutaneous Before meals and at bedtime  lactated ringers. 1000 milliLiter(s) (75 mL/Hr) IV Continuous <Continuous>  pantoprazole    Tablet 40 milliGRAM(s) Oral before breakfast  petrolatum Ophthalmic Ointment 1 Application(s) Both EYES at bedtime  polyethylene glycol 3350 17 Gram(s) Oral two times a day  senna 2 Tablet(s) Oral at bedtime  sodium chloride 0.65% Nasal 2 Spray(s) Both Nostrils four times a day    MEDICATIONS  (PRN):  acetaminophen     Tablet .. 975 milliGRAM(s) Oral every 6 hours PRN Temp greater or equal to 38C (100.4F), Moderate Pain (4 - 6)  artificial tears (preservative free) Ophthalmic Solution 1 Drop(s) Both EYES five times a day PRN Dry Eyes  bisacodyl 5 milliGRAM(s) Oral daily PRN Constipation  calcium carbonate    500 mG (Tums) Chewable 1 Tablet(s) Chew every 6 hours PRN Indigestion  ondansetron Injectable 4 milliGRAM(s) IV Push every 6 hours PRN Nausea and/or Vomiting    Allergies    No Known Allergies    Intolerances          ______________________________________________________________________  PHYSICAL EXAM:  T(C): 36.6 (07-16-24 @ 16:23), Max: 36.7 (07-16-24 @ 00:25)  HR: 66 (07-16-24 @ 16:23)  BP: 112/74 (07-16-24 @ 16:23)  RR: 18 (07-16-24 @ 16:23)  SpO2: 97% (07-16-24 @ 16:23)  Wt(kg): --    07-15  -  07-16  --------------------------------------------------------  IN:    Oral Fluid: 1230 mL  Total IN: 1230 mL    OUT:    Voided (mL): 1650 mL  Total OUT: 1650 mL    Total NET: -420 mL        GEN: NAD, normocephalic  CVS: Normal rate, HD stable  CHEST: No signs of respiratory distress, breathing comfortably, no accessory muscle usage  ABD: soft , nontender, nondistended, bowel sounds present  EXTR: no cyanosis, no clubbing, no edema  NEURO: Awake and alert, conversant  SKIN:  warm;  non icteric        Labs/Imaging reviewed.                        12.0   13.55 )-----------( 227      ( 16 Jul 2024 07:27 )             37.3     Last Hb:Hemoglobin: 12.0 g/dL (07-16-24 @ 07:27)               141   |  105   |  20                 Ca: 9.4    BMP:   ----------------------------< 89     Mg: x     (07-16-24 @ 07:24)             4.4    |  20    | 0.63               Ph: x        LFT:     TPro: 6.2 / Alb: 3.2 / TBili: 0.7 / DBili: <0.1 / AST: 48 / ALT: 101 / AlkPhos: 80   (07-16-24 @ 07:24)    Creatinine: 0.63 mg/dL  Creatinine: 0.62 mg/dL  Creatinine: 0.54 mg/dL      BUN/Cr: Blood Urea Nitrogen: 20 mg/dL (07-16-24 @ 07:24)  /Creatinine: 0.63 mg/dL (07-16-24 @ 07:24)    AST/ALTAspartate Aminotransferase (AST/SGOT): 48 U/L (07-16-24 @ 07:24)  /Alanine Aminotransferase (ALT/SGPT): 101 U/L (07-16-24 @ 07:24)    ALP Alkaline Phosphatase: 80 U/L (07-16-24 @ 07:24)    T/Dbili /Bilirubin Direct: <0.1 mg/dL (07-16-24 @ 07:24)    INR:     EGD/[place]:      Imaging:  US Abdomen Upper Quadrant Right:   ACC: [id number] EXAM:  US ABDOMEN RT UPR QUADRANT   ORDERED BY: RED GUZMAN     PROCEDURE DATE:  07/16/2024          INTERPRETATION:  CLINICAL INFORMATION: Epigastric and right upper   quadrant tenderness. Rule out acute cholecystitis    COMPARISON: None available.    TECHNIQUE: Sonography of the right upper quadrant.    FINDINGS:  Liver: Within normal limits.  Bile ducts: Normal caliber. Common bile duct measures 4.3 mm.  Gallbladder: Within normal limits. Negative sonographic Ashton's sign.  Pancreas: Visualized portions are within normal limits.  Right kidney: 12 cm. No hydronephrosis.  Ascites: None.  IVC: Visualized portions are within normal limits.    IMPRESSION:  Normal right upper quadrant abdominal ultrasound.    --- End of Report ---       INITIAL GI CONSULTATION  HPI:  44F Luxembourgish [facility] presenting w/ progressive HAs/short term memory loss found to have 1.9cm sellar/suprasellar mass s/p transphenoidal resection w/ post op course c/b DI and epigastric pain for which GI is consulted.     Hx as above. Underwent transphenoidal resection 7/11. Post op has been receiving steroids. Had onset of epigastric pain described as burning/gnawing after her surgery. Non radiating, improved with po intake. Sxs have improved since starting PPI and when after drinking milk. Today was able to keep down salmon. No similar sxs in past. Denies acid reflux or regurgitation. No AC. No prior endoscopies.         FamHx: ***no h/o GI malignancies known  PMH/PSH:  PAST MEDICAL & SURGICAL HISTORY:  Hyperlipidemia      H/O: hysterectomy          MEDS:  MEDICATIONS  (STANDING):  aluminum hydroxide/magnesium hydroxide/simethicone Suspension 30 milliLiter(s) Oral every 8 hours  atorvastatin 20 milliGRAM(s) Oral at bedtime  bisacodyl 5 milliGRAM(s) Oral at bedtime  dexAMETHasone     Tablet 2 milliGRAM(s) Oral every 12 hours  dexAMETHasone     Tablet   Oral   enoxaparin Injectable 40 milliGRAM(s) SubCutaneous <User Schedule>  famotidine    Tablet 20 milliGRAM(s) Oral every 12 hours  insulin lispro (ADMELOG) corrective regimen sliding scale   SubCutaneous Before meals and at bedtime  lactated ringers. 1000 milliLiter(s) (75 mL/Hr) IV Continuous <Continuous>  pantoprazole    Tablet 40 milliGRAM(s) Oral before breakfast  petrolatum Ophthalmic Ointment 1 Application(s) Both EYES at bedtime  polyethylene glycol 3350 17 Gram(s) Oral two times a day  senna 2 Tablet(s) Oral at bedtime  sodium chloride 0.65% Nasal 2 Spray(s) Both Nostrils four times a day    MEDICATIONS  (PRN):  acetaminophen     Tablet .. 975 milliGRAM(s) Oral every 6 hours PRN Temp greater or equal to 38C (100.4F), Moderate Pain (4 - 6)  artificial tears (preservative free) Ophthalmic Solution 1 Drop(s) Both EYES five times a day PRN Dry Eyes  bisacodyl 5 milliGRAM(s) Oral daily PRN Constipation  calcium carbonate    500 mG (Tums) Chewable 1 Tablet(s) Chew every 6 hours PRN Indigestion  ondansetron Injectable 4 milliGRAM(s) IV Push every 6 hours PRN Nausea and/or Vomiting    Allergies    No Known Allergies    Intolerances          ______________________________________________________________________  PHYSICAL EXAM:  T(C): 36.6 (07-16-24 @ 16:23), Max: 36.7 (07-16-24 @ 00:25)  HR: 66 (07-16-24 @ 16:23)  BP: 112/74 (07-16-24 @ 16:23)  RR: 18 (07-16-24 @ 16:23)  SpO2: 97% (07-16-24 @ 16:23)  Wt(kg): --    07-15  -  07-16  --------------------------------------------------------  IN:    Oral Fluid: 1230 mL  Total IN: 1230 mL    OUT:    Voided (mL): 1650 mL  Total OUT: 1650 mL    Total NET: -420 mL        GEN: NAD, normocephalic  CVS: Normal rate, HD stable  CHEST: No signs of respiratory distress, breathing comfortably, no accessory muscle usage  ABD: soft , minimally tender in epigastric region  EXTR: no cyanosis, no clubbing, no edema  NEURO: Awake and alert, conversant  SKIN:  warm;  non icteric        Labs/Imaging reviewed.                        12.0   13.55 )-----------( 227      ( 16 Jul 2024 07:27 )             37.3     Last Hb:Hemoglobin: 12.0 g/dL (07-16-24 @ 07:27)               141   |  105   |  20                 Ca: 9.4    BMP:   ----------------------------< 89     Mg: x     (07-16-24 @ 07:24)             4.4    |  20    | 0.63               Ph: x        LFT:     TPro: 6.2 / Alb: 3.2 / TBili: 0.7 / DBili: <0.1 / AST: 48 / ALT: 101 / AlkPhos: 80   (07-16-24 @ 07:24)    Creatinine: 0.63 mg/dL  Creatinine: 0.62 mg/dL  Creatinine: 0.54 mg/dL      BUN/Cr: Blood Urea Nitrogen: 20 mg/dL (07-16-24 @ 07:24)  /Creatinine: 0.63 mg/dL (07-16-24 @ 07:24)    AST/ALTAspartate Aminotransferase (AST/SGOT): 48 U/L (07-16-24 @ 07:24)  /Alanine Aminotransferase (ALT/SGPT): 101 U/L (07-16-24 @ 07:24)    ALP Alkaline Phosphatase: 80 U/L (07-16-24 @ 07:24)    T/Dbili /Bilirubin Direct: <0.1 mg/dL (07-16-24 @ 07:24)    INR:     EGD/[place]:      Imaging:  US Abdomen Upper Quadrant Right:   ACC: [id number] EXAM:  US ABDOMEN RT UPR QUADRANT   ORDERED BY: RED GUZMAN     PROCEDURE DATE:  07/16/2024          INTERPRETATION:  CLINICAL INFORMATION: Epigastric and right upper   quadrant tenderness. Rule out acute cholecystitis    COMPARISON: None available.    TECHNIQUE: Sonography of the right upper quadrant.    FINDINGS:  Liver: Within normal limits.  Bile ducts: Normal caliber. Common bile duct measures 4.3 mm.  Gallbladder: Within normal limits. Negative sonographic Ashton's sign.  Pancreas: Visualized portions are within normal limits.  Right kidney: 12 cm. No hydronephrosis.  Ascites: None.  IVC: Visualized portions are within normal limits.    IMPRESSION:  Normal right upper quadrant abdominal ultrasound.    --- End of Report ---

## 2024-07-16 NOTE — PROGRESS NOTE ADULT - ASSESSMENT
45 y/o F, POD #5 from TSRP with high flow leak, repaired with duragen inlay, duraguard button, L nasoseptal flap. Pt seen and examined at bedside, c/o serosanguinous discharge from nares and blood clots in throat initially but none today. also c/o nasal discomfort. Pt doing well. No evidence of CSF leak or epistaxis on exam.

## 2024-07-16 NOTE — PROGRESS NOTE ADULT - SUBJECTIVE AND OBJECTIVE BOX
Agustina Brar MD, PGY-4  Endocrinology fellow  Available on [employer] Teams    Interval Events: No acute overnight events. Pt seen and examined. Tolerating PO, drinking 2 cups q 25min. Reports HA, nausea, abdominal pain. Denies fevers, chills, CP, SOB, constipation, diarrhea.      MEDICATIONS  (STANDING):  aluminum hydroxide/magnesium hydroxide/simethicone Suspension 30 milliLiter(s) Oral every 8 hours  atorvastatin 20 milliGRAM(s) Oral at bedtime  bisacodyl 5 milliGRAM(s) Oral at bedtime  dexAMETHasone     Tablet 2 milliGRAM(s) Oral every 12 hours  dexAMETHasone     Tablet   Oral   enoxaparin Injectable 40 milliGRAM(s) SubCutaneous <User Schedule>  famotidine    Tablet 20 milliGRAM(s) Oral every 12 hours  insulin lispro (ADMELOG) corrective regimen sliding scale   SubCutaneous Before meals and at bedtime  lactated ringers. 1000 milliLiter(s) (75 mL/Hr) IV Continuous <Continuous>  pantoprazole    Tablet 40 milliGRAM(s) Oral every 12 hours  petrolatum Ophthalmic Ointment 1 Application(s) Both EYES at bedtime  polyethylene glycol 3350 17 Gram(s) Oral two times a day  senna 2 Tablet(s) Oral at bedtime  sodium chloride 0.65% Nasal 2 Spray(s) Both Nostrils four times a day    MEDICATIONS  (PRN):  acetaminophen     Tablet .. 975 milliGRAM(s) Oral every 6 hours PRN Temp greater or equal to 38C (100.4F), Moderate Pain (4 - 6)  artificial tears (preservative free) Ophthalmic Solution 1 Drop(s) Both EYES five times a day PRN Dry Eyes  bisacodyl 5 milliGRAM(s) Oral daily PRN Constipation  calcium carbonate    500 mG (Tums) Chewable 1 Tablet(s) Chew every 6 hours PRN Indigestion  ondansetron Injectable 4 milliGRAM(s) IV Push every 6 hours PRN Nausea and/or Vomiting      Allergies    No Known Allergies    Intolerances          ================PHYSICAL EXAM======================  VITALS: T(C): 36.6 (07-16-24 @ 16:23)  T(F): 97.9 (07-16-24 @ 16:23), Max: 98.1 (07-16-24 @ 00:25)  HR: 66 (07-16-24 @ 16:23) (61 - 78)  BP: 112/74 (07-16-24 @ 16:23) (99/72 - 118/75)  RR:  (18 - 18)  SpO2:  (96% - 98%)  Wt(kg): --  GENERAL: NAD, appears comfortable  EYES: No proptosis, anicteric  HEENT:  Atraumatic, normocephalic  CARDIOVASCULAR: RRR, no MRG, S1/S2  RESPIRATORY: nonlabored respirations, no wheezing  ABDOMEN: Soft, +tender midepigastrium, nondistended, normal bowel sounds  EXTREMITIES: 2+ peripheral pulses, no edema  PSYCH: Alert and awake  ===================================================    CAPILLARY BLOOD GLUCOSE      POCT Blood Glucose.: 128 mg/dL (16 Jul 2024 16:09)  POCT Blood Glucose.: 101 mg/dL (16 Jul 2024 11:17)  POCT Blood Glucose.: 95 mg/dL (16 Jul 2024 07:56)  POCT Blood Glucose.: 120 mg/dL (15 Jul 2024 21:06)      07-16    141  |  105  |  20  ----------------------------<  89  4.4   |  20<L>  |  0.63    eGFR: 111    Ca    9.4      07-16    TPro  6.2  /  Alb  3.2<L>  /  TBili  0.7  /  DBili  <0.1  /  AST  48<H>  /  ALT  101<H>  /  AlkPhos  80  07-16          Thyroid Function Tests:  07-13 @ 04:58 TSH 1.04 FreeT4 1.4 T3 -- Anti TPO -- Anti Thyroglobulin Ab -- TSI --  07-05 @ 06:49 TSH -- FreeT4 1.0 T3 -- Anti TPO <10.0 Anti Thyroglobulin Ab -- TSI --

## 2024-07-16 NOTE — PROGRESS NOTE ADULT - ASSESSMENT
ASSESSMENT AND PLAN: 44F Panamanian speaking ([facility] xfer) p/f progressive HAs/L eye blurry vision/short term mem loss x1mo, w/ acutely worse Lt side HA x2d. At 11AM yesterday suddenly could only see figures/shadows from Lt eye. Optho noted Lt optic nn. dysfxn/no papilledema. 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 No abnormal enhancement is seen.  - ENT following: Skullbase precautions: no straws, no nose blowing, avoid nasal trauma, no nasal cannula, no incentive spirometry; continue nasal saline spray. Patient to follow with Dr. De Santiago outpatient  - Ophthalmology saw patient pre-op, appreciate their recs. as patient was complaining of foreign body sensation over her right eye - per their evaluation: DDx for FBS includes dry eye vs foreign body vs pterygium. Less likely foreign body as no evidence of eye irritation or corneal abrasion or ulcer noted. May also be dryness related to white limbal lesion with inadequate tear film distribution. Etiology of white limbal lesion unclear at this time. May be suggestive of pterygium vs JOSE vs papilloma vs Dry eye. Patient likely Low concern for corneal ulcer at this time. Was placed on Lacri-lube and continue Artificial tears. Will need re-evaluation in 1 week if any changes in size of lesion. No complaints per patient.   - Pain control w/ Tylenol prn and Oxycodone prn  - PT/OT: outpatient PT/OT    PULM:   - On room air, O2Sat>93%  - No incentive spirometry as patient had transphenoidal surgery  - No nasal cannula, no CPAP/BIPAP/High flow; face tent only if needed (patient requested as she was feeling dry in her nares)    CV:  - -160  - Continue Lipitor for HLD  - 7/15 EKG done WNL  - 7/16 Trops negative, CPK WNL  - Lipid panel re-sent today, will f/u    ENDO:   - Appreciate Endocrine following  - Continue to monitor for DI - last Na 141, urine output 600cc over the span of 8 hours, patient drinking to thirst. Labs today not indicative of DI. Will continue to monitor  - Currently on steroid taper, per Endo to check AM cortisol once off steroids. Will f/u with them if can be done outpatient.   - Patient also has a thyroid goiter for which she follows up with Endocrinologist Dr. Espino ([facility]) since 2021. Only been monitoring with US. Follow up outpatient with Dr. Espino. Endocrine to follow after surgery.     HEME/ONC:             7/16 CBC stable, mild leukocytosis likely from steroids         DVT ppx: SQL, SCDs, 7/4 Le Dopp negative    RENAL:   - LR@75 per Hospitalist for concern for pancreatitis  - 7/16 BMP stable  - Voiding    ID:   - Afebrile  - Received post-op abx  - Was Bactroban for +MSSA yazmin 7/4, finished 7/9    GI:    - Continue oral diet  - Senna and Miralax for bowel regimen, last BM 7/14  - C/o epigastric pain - Amylase and Lipase elevated, US Abd: WNL, GI consulted per Hospitalist will f/u recommendations  - 7/16 LFTs mildly elevated, will trend  - Continue Maalox, Pepcid and Tums  - Continue Protonix for GI ppx while on steroids    Appreciate Internal Medicine following for co-medical management.    DISCHARGE PLANNING:   Outpatient PT/OT once medically appropriate    Plan to be discussed w/ Dr. Goncalves  [id number]

## 2024-07-16 NOTE — PROGRESS NOTE ADULT - ASSESSMENT
44F hx of HLD Lithuanian speaking ([facility] xfer) p/f progressive HAs/L eye blurry vision and headache, CTH w/ 1.9cm sellar/suprasellar mass asymmetric to the Lt, c/f pituitary adenoma w/ L optic nerve comp. s/p Transsphenoidal resection of pituitary tumor on 7/11 c/w DI.

## 2024-07-17 ENCOUNTER — TRANSCRIPTION ENCOUNTER (OUTPATIENT)
Age: 44
End: 2024-07-17

## 2024-07-17 ENCOUNTER — APPOINTMENT (OUTPATIENT)
Dept: NEUROSURGERY | Facility: HOSPITAL | Age: 44
End: 2024-07-17

## 2024-07-17 VITALS
OXYGEN SATURATION: 96 % | RESPIRATION RATE: 18 BRPM | TEMPERATURE: 98 F | DIASTOLIC BLOOD PRESSURE: 76 MMHG | HEART RATE: 89 BPM | SYSTOLIC BLOOD PRESSURE: 106 MMHG

## 2024-07-17 LAB
ADD ON TEST-SPECIMEN IN LAB: SIGNIFICANT CHANGE UP
ADD ON TEST-SPECIMEN IN LAB: SIGNIFICANT CHANGE UP
ALBUMIN SERPL ELPH-MCNC: 3.3 G/DL — SIGNIFICANT CHANGE UP (ref 3.3–5)
ALP SERPL-CCNC: 84 U/L — SIGNIFICANT CHANGE UP (ref 40–120)
ALT FLD-CCNC: 107 U/L — HIGH (ref 10–45)
AMYLASE P1 CFR SERPL: 169 U/L — HIGH (ref 25–125)
ANION GAP SERPL CALC-SCNC: 11 MMOL/L — SIGNIFICANT CHANGE UP (ref 5–17)
AST SERPL-CCNC: 38 U/L — SIGNIFICANT CHANGE UP (ref 10–40)
BILIRUB SERPL-MCNC: 0.7 MG/DL — SIGNIFICANT CHANGE UP (ref 0.2–1.2)
BUN SERPL-MCNC: 16 MG/DL — SIGNIFICANT CHANGE UP (ref 7–23)
CALCIUM SERPL-MCNC: 8.9 MG/DL — SIGNIFICANT CHANGE UP (ref 8.4–10.5)
CHLORIDE SERPL-SCNC: 105 MMOL/L — SIGNIFICANT CHANGE UP (ref 96–108)
CHOLEST SERPL-MCNC: 174 MG/DL — SIGNIFICANT CHANGE UP
CO2 SERPL-SCNC: 25 MMOL/L — SIGNIFICANT CHANGE UP (ref 22–31)
CREAT SERPL-MCNC: 0.6 MG/DL — SIGNIFICANT CHANGE UP (ref 0.5–1.3)
EGFR: 112 ML/MIN/1.73M2 — SIGNIFICANT CHANGE UP
GLUCOSE BLDC GLUCOMTR-MCNC: 110 MG/DL — HIGH (ref 70–99)
GLUCOSE BLDC GLUCOMTR-MCNC: 116 MG/DL — HIGH (ref 70–99)
GLUCOSE BLDC GLUCOMTR-MCNC: 119 MG/DL — HIGH (ref 70–99)
GLUCOSE SERPL-MCNC: 113 MG/DL — HIGH (ref 70–99)
HDLC SERPL-MCNC: 83 MG/DL — SIGNIFICANT CHANGE UP
LIDOCAIN IGE QN: 182 U/L — HIGH (ref 7–60)
LIPID PNL WITH DIRECT LDL SERPL: 79 MG/DL — SIGNIFICANT CHANGE UP
NON HDL CHOLESTEROL: 91 MG/DL — SIGNIFICANT CHANGE UP
OSMOLALITY SERPL: 295 MOSMOL/KG — SIGNIFICANT CHANGE UP (ref 275–300)
OSMOLALITY UR: 348 MOS/KG — SIGNIFICANT CHANGE UP (ref 300–900)
POTASSIUM SERPL-MCNC: 4.2 MMOL/L — SIGNIFICANT CHANGE UP (ref 3.5–5.3)
POTASSIUM SERPL-SCNC: 4.2 MMOL/L — SIGNIFICANT CHANGE UP (ref 3.5–5.3)
PROT SERPL-MCNC: 6 G/DL — SIGNIFICANT CHANGE UP (ref 6–8.3)
SODIUM SERPL-SCNC: 141 MMOL/L — SIGNIFICANT CHANGE UP (ref 135–145)
SP GR UR STRIP: 1.01 — SIGNIFICANT CHANGE UP (ref 1–1.03)
SURGICAL PATHOLOGY STUDY: SIGNIFICANT CHANGE UP
TRIGL SERPL-MCNC: 64 MG/DL — SIGNIFICANT CHANGE UP

## 2024-07-17 PROCEDURE — 97164 PT RE-EVAL EST PLAN CARE: CPT

## 2024-07-17 PROCEDURE — 84300 ASSAY OF URINE SODIUM: CPT

## 2024-07-17 PROCEDURE — 85027 COMPLETE CBC AUTOMATED: CPT

## 2024-07-17 PROCEDURE — 82435 ASSAY OF BLOOD CHLORIDE: CPT

## 2024-07-17 PROCEDURE — 97110 THERAPEUTIC EXERCISES: CPT

## 2024-07-17 PROCEDURE — 97116 GAIT TRAINING THERAPY: CPT

## 2024-07-17 PROCEDURE — 80053 COMPREHEN METABOLIC PANEL: CPT

## 2024-07-17 PROCEDURE — 88342 IMHCHEM/IMCYTCHM 1ST ANTB: CPT

## 2024-07-17 PROCEDURE — C1889: CPT

## 2024-07-17 PROCEDURE — 88305 TISSUE EXAM BY PATHOLOGIST: CPT

## 2024-07-17 PROCEDURE — C1713: CPT

## 2024-07-17 PROCEDURE — A9585: CPT

## 2024-07-17 PROCEDURE — 83690 ASSAY OF LIPASE: CPT

## 2024-07-17 PROCEDURE — 82150 ASSAY OF AMYLASE: CPT

## 2024-07-17 PROCEDURE — 99285 EMERGENCY DEPT VISIT HI MDM: CPT

## 2024-07-17 PROCEDURE — 80048 BASIC METABOLIC PNL TOTAL CA: CPT

## 2024-07-17 PROCEDURE — 81005 URINALYSIS: CPT

## 2024-07-17 PROCEDURE — 85014 HEMATOCRIT: CPT

## 2024-07-17 PROCEDURE — 82962 GLUCOSE BLOOD TEST: CPT

## 2024-07-17 PROCEDURE — 82947 ASSAY GLUCOSE BLOOD QUANT: CPT

## 2024-07-17 PROCEDURE — 97166 OT EVAL MOD COMPLEX 45 MIN: CPT

## 2024-07-17 PROCEDURE — 70553 MRI BRAIN STEM W/O & W/DYE: CPT | Mod: MC

## 2024-07-17 PROCEDURE — 88360 TUMOR IMMUNOHISTOCHEM/MANUAL: CPT

## 2024-07-17 PROCEDURE — 87641 MR-STAPH DNA AMP PROBE: CPT

## 2024-07-17 PROCEDURE — 82024 ASSAY OF ACTH: CPT

## 2024-07-17 PROCEDURE — 97161 PT EVAL LOW COMPLEX 20 MIN: CPT

## 2024-07-17 PROCEDURE — 84484 ASSAY OF TROPONIN QUANT: CPT

## 2024-07-17 PROCEDURE — 84295 ASSAY OF SERUM SODIUM: CPT

## 2024-07-17 PROCEDURE — 82330 ASSAY OF CALCIUM: CPT

## 2024-07-17 PROCEDURE — 84436 ASSAY OF TOTAL THYROXINE: CPT

## 2024-07-17 PROCEDURE — 97530 THERAPEUTIC ACTIVITIES: CPT

## 2024-07-17 PROCEDURE — 70543 MRI ORBT/FAC/NCK W/O &W/DYE: CPT | Mod: MC

## 2024-07-17 PROCEDURE — 83735 ASSAY OF MAGNESIUM: CPT

## 2024-07-17 PROCEDURE — 84146 ASSAY OF PROLACTIN: CPT

## 2024-07-17 PROCEDURE — 93005 ELECTROCARDIOGRAM TRACING: CPT

## 2024-07-17 PROCEDURE — 82803 BLOOD GASES ANY COMBINATION: CPT

## 2024-07-17 PROCEDURE — 84702 CHORIONIC GONADOTROPIN TEST: CPT

## 2024-07-17 PROCEDURE — 84100 ASSAY OF PHOSPHORUS: CPT

## 2024-07-17 PROCEDURE — 76705 ECHO EXAM OF ABDOMEN: CPT

## 2024-07-17 PROCEDURE — 93970 EXTREMITY STUDY: CPT

## 2024-07-17 PROCEDURE — 84305 ASSAY OF SOMATOMEDIN: CPT

## 2024-07-17 PROCEDURE — 83930 ASSAY OF BLOOD OSMOLALITY: CPT

## 2024-07-17 PROCEDURE — 36415 COLL VENOUS BLD VENIPUNCTURE: CPT

## 2024-07-17 PROCEDURE — 84132 ASSAY OF SERUM POTASSIUM: CPT

## 2024-07-17 PROCEDURE — C9399: CPT

## 2024-07-17 PROCEDURE — 86376 MICROSOMAL ANTIBODY EACH: CPT

## 2024-07-17 PROCEDURE — 84439 ASSAY OF FREE THYROXINE: CPT

## 2024-07-17 PROCEDURE — 80061 LIPID PANEL: CPT

## 2024-07-17 PROCEDURE — 99232 SBSQ HOSP IP/OBS MODERATE 35: CPT

## 2024-07-17 PROCEDURE — 83002 ASSAY OF GONADOTROPIN (LH): CPT

## 2024-07-17 PROCEDURE — 82550 ASSAY OF CK (CPK): CPT

## 2024-07-17 PROCEDURE — C1769: CPT

## 2024-07-17 PROCEDURE — 86900 BLOOD TYPING SEROLOGIC ABO: CPT

## 2024-07-17 PROCEDURE — 80076 HEPATIC FUNCTION PANEL: CPT

## 2024-07-17 PROCEDURE — 83605 ASSAY OF LACTIC ACID: CPT

## 2024-07-17 PROCEDURE — 86850 RBC ANTIBODY SCREEN: CPT

## 2024-07-17 PROCEDURE — 82670 ASSAY OF TOTAL ESTRADIOL: CPT

## 2024-07-17 PROCEDURE — 86901 BLOOD TYPING SEROLOGIC RH(D): CPT

## 2024-07-17 PROCEDURE — 99232 SBSQ HOSP IP/OBS MODERATE 35: CPT | Mod: GC

## 2024-07-17 PROCEDURE — 87640 STAPH A DNA AMP PROBE: CPT

## 2024-07-17 PROCEDURE — 88341 IMHCHEM/IMCYTCHM EA ADD ANTB: CPT

## 2024-07-17 PROCEDURE — 85018 HEMOGLOBIN: CPT

## 2024-07-17 PROCEDURE — 83001 ASSAY OF GONADOTROPIN (FSH): CPT

## 2024-07-17 PROCEDURE — 84443 ASSAY THYROID STIM HORMONE: CPT

## 2024-07-17 PROCEDURE — 97165 OT EVAL LOW COMPLEX 30 MIN: CPT

## 2024-07-17 PROCEDURE — 85730 THROMBOPLASTIN TIME PARTIAL: CPT

## 2024-07-17 PROCEDURE — 81001 URINALYSIS AUTO W/SCOPE: CPT

## 2024-07-17 PROCEDURE — 85610 PROTHROMBIN TIME: CPT

## 2024-07-17 PROCEDURE — 83935 ASSAY OF URINE OSMOLALITY: CPT

## 2024-07-17 PROCEDURE — 82533 TOTAL CORTISOL: CPT

## 2024-07-17 RX ORDER — POLYVINYL ALCOHOL, POVIDONE .5; .6 G/100ML; G/100ML
1 SOLUTION OPHTHALMIC
Qty: 0 | Refills: 0 | DISCHARGE
Start: 2024-07-17

## 2024-07-17 RX ORDER — PANTOPRAZOLE SODIUM 40 MG/10ML
1 INJECTION, POWDER, FOR SOLUTION INTRAVENOUS
Qty: 60 | Refills: 0
Start: 2024-07-17 | End: 2024-08-15

## 2024-07-17 RX ORDER — ATORVASTATIN CALCIUM 20 MG/1
1 TABLET, FILM COATED ORAL
Qty: 30 | Refills: 0
Start: 2024-07-17 | End: 2024-08-15

## 2024-07-17 RX ORDER — DEXAMETHASONE 1 MG/1
2 TABLET ORAL
Qty: 8 | Refills: 0
Start: 2024-07-17

## 2024-07-17 RX ORDER — CALCIUM CARBONATE 500(1250)
1 TABLET,CHEWABLE ORAL
Qty: 0 | Refills: 0 | DISCHARGE
Start: 2024-07-17

## 2024-07-17 RX ORDER — SENNOSIDES 8.6 MG
2 TABLET ORAL
Qty: 0 | Refills: 0 | DISCHARGE
Start: 2024-07-17

## 2024-07-17 RX ORDER — SODIUM CHLORIDE 0.65 %
2 AEROSOL, SPRAY (ML) NASAL
Qty: 0 | Refills: 0 | DISCHARGE
Start: 2024-07-17

## 2024-07-17 RX ORDER — ACETAMINOPHEN 325 MG
2 TABLET ORAL
Qty: 0 | Refills: 0 | DISCHARGE
Start: 2024-07-17

## 2024-07-17 RX ORDER — POLYETHYLENE GLYCOL 3350 1 G/G
17 POWDER ORAL
Qty: 0 | Refills: 0 | DISCHARGE
Start: 2024-07-17

## 2024-07-17 RX ADMIN — DEXAMETHASONE 2 MILLIGRAM(S): 1 TABLET ORAL at 06:04

## 2024-07-17 RX ADMIN — Medication 2 SPRAY(S): at 11:48

## 2024-07-17 RX ADMIN — Medication 2 SPRAY(S): at 00:00

## 2024-07-17 RX ADMIN — DEXAMETHASONE 2 MILLIGRAM(S): 1 TABLET ORAL at 17:08

## 2024-07-17 RX ADMIN — PANTOPRAZOLE SODIUM 40 MILLIGRAM(S): 40 INJECTION, POWDER, FOR SOLUTION INTRAVENOUS at 17:08

## 2024-07-17 RX ADMIN — Medication 2 SPRAY(S): at 06:04

## 2024-07-17 RX ADMIN — Medication 2 SPRAY(S): at 17:19

## 2024-07-17 RX ADMIN — Medication 30 MILLILITER(S): at 06:04

## 2024-07-17 RX ADMIN — PANTOPRAZOLE SODIUM 40 MILLIGRAM(S): 40 INJECTION, POWDER, FOR SOLUTION INTRAVENOUS at 06:04

## 2024-07-17 NOTE — PRE-ANESTHESIA EVALUATION ADULT - NSANTHPMHFT_GEN_ALL_CORE
p/w left eye reduced vision  denies gerd
History of Present Illness:   44F Montenegrin speaking ([facility] xfer) p/f progressive HAs/L eye blurry vision/short term mem loss x1mo, w/ acutely worse Lt side HA x2d. At 11AM yesterday suddenly could only see figures/shadows from Lt eye. Optho noted Lt optic nn. dysfxn/no papilledema. 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 Exam: Wide awake, Ox3, pupils dilated by optho, VFF to finger counting b/l but Lt eye can only see shadows/figure outlines, no drift o/w intact

## 2024-07-17 NOTE — PROGRESS NOTE ADULT - SUBJECTIVE AND OBJECTIVE BOX
Nate Tadeo   contact via pager or TEAMS        CC: Patient is a 44y old  Female who presents with a chief complaint of TSP  (15 Jul 2024 08:10)      SUBJECTIVE / OVERNIGHT EVENTS:    MEDICATIONS  (STANDING):  atorvastatin 20 milliGRAM(s) Oral at bedtime  bisacodyl 5 milliGRAM(s) Oral at bedtime  dexAMETHasone     Tablet 2 milliGRAM(s) Oral every 12 hours  dexAMETHasone     Tablet   Oral   enoxaparin Injectable 40 milliGRAM(s) SubCutaneous <User Schedule>  insulin lispro (ADMELOG) corrective regimen sliding scale   SubCutaneous Before meals and at bedtime  pantoprazole    Tablet 40 milliGRAM(s) Oral every 12 hours  petrolatum Ophthalmic Ointment 1 Application(s) Both EYES at bedtime  polyethylene glycol 3350 17 Gram(s) Oral two times a day  senna 2 Tablet(s) Oral at bedtime  sodium chloride 0.65% Nasal 2 Spray(s) Both Nostrils four times a day    MEDICATIONS  (PRN):  acetaminophen     Tablet .. 975 milliGRAM(s) Oral every 6 hours PRN Temp greater or equal to 38C (100.4F), Moderate Pain (4 - 6)  artificial tears (preservative free) Ophthalmic Solution 1 Drop(s) Both EYES five times a day PRN Dry Eyes  bisacodyl 5 milliGRAM(s) Oral daily PRN Constipation  calcium carbonate    500 mG (Tums) Chewable 1 Tablet(s) Chew every 6 hours PRN Indigestion  ondansetron Injectable 4 milliGRAM(s) IV Push every 6 hours PRN Nausea and/or Vomiting      Vital Signs Last 24 Hrs  T(C): 36.8 (17 Jul 2024 10:06), Max: 36.8 (16 Jul 2024 20:12)  T(F): 98.2 (17 Jul 2024 08:59), Max: 98.2 (16 Jul 2024 20:12)  HR: 93 (17 Jul 2024 10:06) (53 - 93)  BP: 106/73 (17 Jul 2024 10:06) (99/72 - 112/74)  BP(mean): 81 (17 Jul 2024 10:06) (81 - 81)  RR: 18 (17 Jul 2024 10:06) (18 - 18)  SpO2: 95% (17 Jul 2024 10:06) (95% - 98%)  CAPILLARY BLOOD GLUCOSE      POCT Blood Glucose.: 116 mg/dL (17 Jul 2024 07:38)  POCT Blood Glucose.: 145 mg/dL (16 Jul 2024 21:05)  POCT Blood Glucose.: 128 mg/dL (16 Jul 2024 16:09)    I&O's Summary    16 Jul 2024 07:01  -  17 Jul 2024 07:00  --------------------------------------------------------  IN: 2560 mL / OUT: 4150 mL / NET: -1590 mL    17 Jul 2024 07:01  -  17 Jul 2024 11:19  --------------------------------------------------------  IN: 585 mL / OUT: 900 mL / NET: -315 mL      tele:    PHYSICAL EXAM:    GENERAL: NAD   HEENT: EOMI, PERRL  PULM: Clear to auscultation bilaterally  CV: Regular rate and rhythm; nl S1, S2; No murmurs, rubs, or gallops  ABDOMEN: Soft, Nontender, Nondistended; Bowel sounds present  EXTREMITIES/MSK:  No edema, calf tenderness   PSYCH: AAOx3  NEUROLOGY: non-focal          LABS:                        12.0   13.55 )-----------( 227      ( 16 Jul 2024 07:27 )             37.3     07-17    141  |  105  |  16  ----------------------------<  113<H>  4.2   |  25  |  0.60    Ca    8.9      17 Jul 2024 06:23    TPro  6.0  /  Alb  3.3  /  TBili  0.7  /  DBili  x   /  AST  38  /  ALT  107<H>  /  AlkPhos  84  07-17      CARDIAC MARKERS ( 16 Jul 2024 07:24 )  x     / x     / 62 U/L / x     / x          Urinalysis Basic - ( 17 Jul 2024 06:23 )    Color: x / Appearance: x / SG: x / pH: x  Gluc: 113 mg/dL / Ketone: x  / Bili: x / Urobili: x   Blood: x / Protein: x / Nitrite: x   Leuk Esterase: x / RBC: x / WBC x   Sq Epi: x / Non Sq Epi: x / Bacteria: x          RADIOLOGY & ADDITIONAL TESTS:    Imaging Personally Reviewed:    Consultant(s) Notes Reviewed:      Care Discussed with Consultants/Other Providers:   Nate Tadeo   contact via pager or TEAMS        CC: Patient is a 44y old  Female who presents with a chief complaint of TSP  (15 Jul 2024 08:10)      SUBJECTIVE / OVERNIGHT EVENTS: sig relief of epigastric burning pain with long periods of resolution. tolerating diet. no n/v. improved postop HA. other ros neg    MEDICATIONS  (STANDING):  atorvastatin 20 milliGRAM(s) Oral at bedtime  bisacodyl 5 milliGRAM(s) Oral at bedtime  dexAMETHasone     Tablet 2 milliGRAM(s) Oral every 12 hours  dexAMETHasone     Tablet   Oral   enoxaparin Injectable 40 milliGRAM(s) SubCutaneous <User Schedule>  insulin lispro (ADMELOG) corrective regimen sliding scale   SubCutaneous Before meals and at bedtime  pantoprazole    Tablet 40 milliGRAM(s) Oral every 12 hours  petrolatum Ophthalmic Ointment 1 Application(s) Both EYES at bedtime  polyethylene glycol 3350 17 Gram(s) Oral two times a day  senna 2 Tablet(s) Oral at bedtime  sodium chloride 0.65% Nasal 2 Spray(s) Both Nostrils four times a day    MEDICATIONS  (PRN):  acetaminophen     Tablet .. 975 milliGRAM(s) Oral every 6 hours PRN Temp greater or equal to 38C (100.4F), Moderate Pain (4 - 6)  artificial tears (preservative free) Ophthalmic Solution 1 Drop(s) Both EYES five times a day PRN Dry Eyes  bisacodyl 5 milliGRAM(s) Oral daily PRN Constipation  calcium carbonate    500 mG (Tums) Chewable 1 Tablet(s) Chew every 6 hours PRN Indigestion  ondansetron Injectable 4 milliGRAM(s) IV Push every 6 hours PRN Nausea and/or Vomiting      Vital Signs Last 24 Hrs  T(C): 36.8 (17 Jul 2024 10:06), Max: 36.8 (16 Jul 2024 20:12)  T(F): 98.2 (17 Jul 2024 08:59), Max: 98.2 (16 Jul 2024 20:12)  HR: 93 (17 Jul 2024 10:06) (53 - 93)  BP: 106/73 (17 Jul 2024 10:06) (99/72 - 112/74)  BP(mean): 81 (17 Jul 2024 10:06) (81 - 81)  RR: 18 (17 Jul 2024 10:06) (18 - 18)  SpO2: 95% (17 Jul 2024 10:06) (95% - 98%)  CAPILLARY BLOOD GLUCOSE      POCT Blood Glucose.: 116 mg/dL (17 Jul 2024 07:38)  POCT Blood Glucose.: 145 mg/dL (16 Jul 2024 21:05)  POCT Blood Glucose.: 128 mg/dL (16 Jul 2024 16:09)    I&O's Summary    16 Jul 2024 07:01  -  17 Jul 2024 07:00  --------------------------------------------------------  IN: 2560 mL / OUT: 4150 mL / NET: -1590 mL    17 Jul 2024 07:01  -  17 Jul 2024 11:19  --------------------------------------------------------  IN: 585 mL / OUT: 900 mL / NET: -315 mL          PHYSICAL EXAM:    GENERAL: NAD   HEENT: EOMI, PERRL  PULM: Clear to auscultation bilaterally  CV: Regular rate and rhythm; nl S1, S2; No murmurs, rubs, or gallops  ABDOMEN: Soft, Nontender, Nondistended; Bowel sounds present  EXTREMITIES/MSK:  No edema, calf tenderness   PSYCH: AAOx3  NEUROLOGY: non-focal          LABS:                        12.0   13.55 )-----------( 227      ( 16 Jul 2024 07:27 )             37.3     07-17    141  |  105  |  16  ----------------------------<  113<H>  4.2   |  25  |  0.60    Ca    8.9      17 Jul 2024 06:23    TPro  6.0  /  Alb  3.3  /  TBili  0.7  /  DBili  x   /  AST  38  /  ALT  107<H>  /  AlkPhos  84  07-17      CARDIAC MARKERS ( 16 Jul 2024 07:24 )  x     / x     / 62 U/L / x     / x          Urinalysis Basic - ( 17 Jul 2024 06:23 )    Color: x / Appearance: x / SG: x / pH: x  Gluc: 113 mg/dL / Ketone: x  / Bili: x / Urobili: x   Blood: x / Protein: x / Nitrite: x   Leuk Esterase: x / RBC: x / WBC x   Sq Epi: x / Non Sq Epi: x / Bacteria: x          RADIOLOGY & ADDITIONAL TESTS:    Imaging Personally Reviewed:    Consultant(s) Notes Reviewed:      Care Discussed with Consultants/Other Providers: neurosurg

## 2024-07-17 NOTE — PROGRESS NOTE ADULT - PROBLEM SELECTOR PROBLEM 1
Pituitary adenoma
Sellar or suprasellar mass
Pituitary adenoma
Sellar or suprasellar mass
Sellar or suprasellar mass
Epigastric pain
Sellar or suprasellar mass
Sellar or suprasellar mass
Epigastric pain
Pituitary adenoma

## 2024-07-17 NOTE — PROGRESS NOTE ADULT - SUBJECTIVE AND OBJECTIVE BOX
Agustina Brar MD, PGY-4  Endocrinology fellow  Available on [employer] Teams    Interval Events: No acute overnight events. Pt seen and examined. Tolerating PO, no nausea/vomiting. No hypoglycemia symptoms. Denies fevers, chills, CP, SOB, abdominal pain, constipation, diarrhea.      MEDICATIONS  (STANDING):  atorvastatin 20 milliGRAM(s) Oral at bedtime  bisacodyl 5 milliGRAM(s) Oral at bedtime  dexAMETHasone     Tablet 2 milliGRAM(s) Oral every 12 hours  dexAMETHasone     Tablet   Oral   enoxaparin Injectable 40 milliGRAM(s) SubCutaneous <User Schedule>  famotidine    Tablet 20 milliGRAM(s) Oral every 12 hours  insulin lispro (ADMELOG) corrective regimen sliding scale   SubCutaneous Before meals and at bedtime  lactated ringers. 1000 milliLiter(s) (75 mL/Hr) IV Continuous <Continuous>  pantoprazole    Tablet 40 milliGRAM(s) Oral every 12 hours  petrolatum Ophthalmic Ointment 1 Application(s) Both EYES at bedtime  polyethylene glycol 3350 17 Gram(s) Oral two times a day  senna 2 Tablet(s) Oral at bedtime  sodium chloride 0.65% Nasal 2 Spray(s) Both Nostrils four times a day    MEDICATIONS  (PRN):  acetaminophen     Tablet .. 975 milliGRAM(s) Oral every 6 hours PRN Temp greater or equal to 38C (100.4F), Moderate Pain (4 - 6)  artificial tears (preservative free) Ophthalmic Solution 1 Drop(s) Both EYES five times a day PRN Dry Eyes  bisacodyl 5 milliGRAM(s) Oral daily PRN Constipation  calcium carbonate    500 mG (Tums) Chewable 1 Tablet(s) Chew every 6 hours PRN Indigestion  ondansetron Injectable 4 milliGRAM(s) IV Push every 6 hours PRN Nausea and/or Vomiting      Allergies    No Known Allergies    Intolerances          ================PHYSICAL EXAM======================  VITALS: T(C): 36.8 (07-17-24 @ 08:59)  T(F): 98.2 (07-17-24 @ 08:59), Max: 98.2 (07-16-24 @ 20:12)  HR: 93 (07-17-24 @ 08:59) (53 - 93)  BP: 106/73 (07-17-24 @ 08:59) (99/72 - 112/74)  RR:  (18 - 18)  SpO2:  (95% - 98%)  Wt(kg): --  GENERAL: NAD, appears comfortable  EYES: No proptosis, no lid lag, anicteric  HEENT:  Atraumatic, Normocephalic, moist mucous membranes  CARDIOVASCULAR: RRR, no MRG, S1/S2  RESPIRATORY: nonlabored respirations, no wheezing  ABDOMEN: Soft, nontender, nondistended, normal bowel sounds  EXTREMITIES: 2+ peripheral pulses, no edema  PSYCH: Alert and awake  ===================================================    CAPILLARY BLOOD GLUCOSE      POCT Blood Glucose.: 116 mg/dL (17 Jul 2024 07:38)  POCT Blood Glucose.: 145 mg/dL (16 Jul 2024 21:05)  POCT Blood Glucose.: 128 mg/dL (16 Jul 2024 16:09)  POCT Blood Glucose.: 101 mg/dL (16 Jul 2024 11:17)      07-17    141  |  105  |  16  ----------------------------<  113<H>  4.2   |  25  |  0.60    eGFR: 112    Ca    8.9      07-17    TPro  6.0  /  Alb  3.3  /  TBili  0.7  /  DBili  x   /  AST  38  /  ALT  107<H>  /  AlkPhos  84  07-17          Thyroid Function Tests:  07-13 @ 04:58 TSH 1.04 FreeT4 1.4 T3 -- Anti TPO -- Anti Thyroglobulin Ab -- TSI --  07-05 @ 06:49 TSH -- FreeT4 1.0 T3 -- Anti TPO <10.0 Anti Thyroglobulin Ab -- TSI --                       Agustina Brar MD, PGY-4  Endocrinology fellow  Available on [employer] Teams    Interval Events: No acute overnight events. Still has HA and fatigue, but overall feels better. Tolerating PO, no nausea/vomiting.       MEDICATIONS  (STANDING):  atorvastatin 20 milliGRAM(s) Oral at bedtime  bisacodyl 5 milliGRAM(s) Oral at bedtime  dexAMETHasone     Tablet 2 milliGRAM(s) Oral every 12 hours  dexAMETHasone     Tablet   Oral   enoxaparin Injectable 40 milliGRAM(s) SubCutaneous <User Schedule>  famotidine    Tablet 20 milliGRAM(s) Oral every 12 hours  insulin lispro (ADMELOG) corrective regimen sliding scale   SubCutaneous Before meals and at bedtime  lactated ringers. 1000 milliLiter(s) (75 mL/Hr) IV Continuous <Continuous>  pantoprazole    Tablet 40 milliGRAM(s) Oral every 12 hours  petrolatum Ophthalmic Ointment 1 Application(s) Both EYES at bedtime  polyethylene glycol 3350 17 Gram(s) Oral two times a day  senna 2 Tablet(s) Oral at bedtime  sodium chloride 0.65% Nasal 2 Spray(s) Both Nostrils four times a day    MEDICATIONS  (PRN):  acetaminophen     Tablet .. 975 milliGRAM(s) Oral every 6 hours PRN Temp greater or equal to 38C (100.4F), Moderate Pain (4 - 6)  artificial tears (preservative free) Ophthalmic Solution 1 Drop(s) Both EYES five times a day PRN Dry Eyes  bisacodyl 5 milliGRAM(s) Oral daily PRN Constipation  calcium carbonate    500 mG (Tums) Chewable 1 Tablet(s) Chew every 6 hours PRN Indigestion  ondansetron Injectable 4 milliGRAM(s) IV Push every 6 hours PRN Nausea and/or Vomiting      Allergies    No Known Allergies    Intolerances          ================PHYSICAL EXAM======================  VITALS: T(C): 36.8 (07-17-24 @ 08:59)  T(F): 98.2 (07-17-24 @ 08:59), Max: 98.2 (07-16-24 @ 20:12)  HR: 93 (07-17-24 @ 08:59) (53 - 93)  BP: 106/73 (07-17-24 @ 08:59) (99/72 - 112/74)  RR:  (18 - 18)  SpO2:  (95% - 98%)  Wt(kg): --  GENERAL: NAD, appears comfortable  EYES: No proptosis, no lid lag, anicteric  HEENT:  Atraumatic, Normocephalic, moist mucous membranes  CARDIOVASCULAR: RRR, no MRG, S1/S2  RESPIRATORY: nonlabored respirations, no wheezing  ABDOMEN: Soft, nontender, nondistended, normal bowel sounds  EXTREMITIES: 2+ peripheral pulses, no edema  PSYCH: Alert and awake  ===================================================    CAPILLARY BLOOD GLUCOSE      POCT Blood Glucose.: 116 mg/dL (17 Jul 2024 07:38)  POCT Blood Glucose.: 145 mg/dL (16 Jul 2024 21:05)  POCT Blood Glucose.: 128 mg/dL (16 Jul 2024 16:09)  POCT Blood Glucose.: 101 mg/dL (16 Jul 2024 11:17)      07-17    141  |  105  |  16  ----------------------------<  113<H>  4.2   |  25  |  0.60    eGFR: 112    Ca    8.9      07-17    TPro  6.0  /  Alb  3.3  /  TBili  0.7  /  DBili  x   /  AST  38  /  ALT  107<H>  /  AlkPhos  84  07-17          Thyroid Function Tests:  07-13 @ 04:58 TSH 1.04 FreeT4 1.4 T3 -- Anti TPO -- Anti Thyroglobulin Ab -- TSI --  07-05 @ 06:49 TSH -- FreeT4 1.0 T3 -- Anti TPO <10.0 Anti Thyroglobulin Ab -- TSI --

## 2024-07-17 NOTE — PROGRESS NOTE ADULT - PROBLEM SELECTOR PROBLEM 2
Elevated TSH
Diabetes insipidus
Elevated TSH
Elevated TSH
Sellar or suprasellar mass
Diabetes insipidus

## 2024-07-17 NOTE — DISCHARGE NOTE NURSING/CASE MANAGEMENT/SOCIAL WORK - NSDCFUADDAPPT_GEN_ALL_CORE_FT
Please make an appointment and follow up with the following people in 1 week:    1. Dr. Espino (Endocrinologist) - you have an appointment with them on 7/22/24 Monday - it is VERY IMPORTANT to come to this appointment.   2. Dr. Rebekah Hayward (Primary Care Provider)  3. [facility] Department of Ophthalmology  [address] [place] [address]. [address]  [place] [address]  [phone]  4. Gastroenterology Clinic number to confirm/arrange appointment; [phone] ([facility] at [address] [place] [address]) or [phone] ([facility] at [address] [place] [address]) or [phone] ([facility] at [address] [place] [address]).

## 2024-07-17 NOTE — PROGRESS NOTE ADULT - ASSESSMENT
44F [place] speaking ([facility] xfer) presenting w/ progressive HAs/short term memory loss found to have 1.9cm sellar/suprasellar mass s/p transphenoidal resection w/ post op course c/b DI and epigastric pain for which GI is consulted.     #Pituitary adenoma s/p resection on steroids  #epigastric pain  GI consulted for dyspepsia in patient after transphenoidal resection on steroids. Suspect etiology most likely 2/2 erosive dyspepsia vs. PUD given sxs in setting risk factors (was on steroids). Less likely pancreatitis (elevated lipase can be seen in PUD). She is clinically improved on conservative management, now tolerating diet.  Recommendations:  -C/w PPI bid x2 weeks then daily  -Can trial sucralfate for sxs relief  -Taper steroids as able  -Ok for d/c with GI follow up- endoscopic eval can be considered at that time if needed  - Please provide patient with Gastroenterology Clinic number to confirm/arrange appointment; [phone] ([facility] at [address] [place] [address]) or [phone] ([facility] at [address] [place] [address]) or [phone] ([facility] at [address] [facility]).   -GI will sign off. Please call with questions        Note incomplete until finalized by attending signature/attestation.    Ashli Vieira  GI/Hepatology Fellow PGY5    NON-URGENT CONSULTS:  Please email [email or web address]  AT NIGHT AND ON WEEKENDS:  Available on [employer] Teams  [phone] ([employer] Pager)    For urgent consults, please contact on call GI team. See Amion schedule ([facility]) or [employer] system ([facility])

## 2024-07-17 NOTE — PROGRESS NOTE ADULT - PROVIDER SPECIALTY LIST ADULT
ENT
Endocrinology
Hospitalist
NSICU
Neurosurgery
ENT
ENT
Endocrinology
Gastroenterology
Hospitalist
NSICU
Neurosurgery
ENT
ENT
Endocrinology
NSICU
Neurosurgery
Ophthalmology
NSICU
NSICU
Neurosurgery
ENT
Endocrinology
NSICU
ENT
Endocrinology
Endocrinology

## 2024-07-17 NOTE — PROGRESS NOTE ADULT - ATTENDING COMMENTS
Full resolution of symptoms with starting PPI BID  Agree with PPI plan as above  Reasonable to follow with GI as outpatient

## 2024-07-17 NOTE — DISCHARGE NOTE NURSING/CASE MANAGEMENT/SOCIAL WORK - NSDCPEFALRISK_GEN_ALL_CORE
For information on Fall & Injury Prevention, visit: [email or web address] OR  [email or web address] OR  https://www.cdc.gov/steadi/patient.html

## 2024-07-17 NOTE — DISCHARGE NOTE NURSING/CASE MANAGEMENT/SOCIAL WORK - PATIENT PORTAL LINK FT
You can access the FollowMyHealth Patient Portal offered by [facility] by registering at the following website: [email or web address]. By joining [facility]’s FollowMyHealth portal, you will also be able to view your health information using other applications (apps) compatible with our system.

## 2024-07-17 NOTE — PROGRESS NOTE ADULT - SUBJECTIVE AND OBJECTIVE BOX
Interval Events:   -Feeling a lot better. Had breakfast w/ eggs and cheese  -[facility] Medications:  acetaminophen     Tablet .. 975 milliGRAM(s) Oral every 6 hours PRN  artificial tears (preservative free) Ophthalmic Solution 1 Drop(s) Both EYES five times a day PRN  atorvastatin 20 milliGRAM(s) Oral at bedtime  bisacodyl 5 milliGRAM(s) Oral at bedtime  bisacodyl 5 milliGRAM(s) Oral daily PRN  calcium carbonate    500 mG (Tums) Chewable 1 Tablet(s) Chew every 6 hours PRN  dexAMETHasone     Tablet 2 milliGRAM(s) Oral every 12 hours  dexAMETHasone     Tablet   Oral   enoxaparin Injectable 40 milliGRAM(s) SubCutaneous <User Schedule>  insulin lispro (ADMELOG) corrective regimen sliding scale   SubCutaneous Before meals and at bedtime  ondansetron Injectable 4 milliGRAM(s) IV Push every 6 hours PRN  pantoprazole    Tablet 40 milliGRAM(s) Oral every 12 hours  petrolatum Ophthalmic Ointment 1 Application(s) Both EYES at bedtime  polyethylene glycol 3350 17 Gram(s) Oral two times a day  senna 2 Tablet(s) Oral at bedtime  sodium chloride 0.65% Nasal 2 Spray(s) Both Nostrils four times a day              07-16-24 @ 07:01  -  07-17-24 @ 07:00  --------------------------------------------------------  IN: 1560 mL / OUT: 0 mL / NET: 1560 mL    07-17-24 @ 07:01  -  07-17-24 @ 11:32  --------------------------------------------------------  IN: 360 mL / OUT: 0 mL / NET: 360 mL          PHYSICAL EXAM:   Vital Signs:  Vital Signs Last 24 Hrs  T(C): 36.8 (17 Jul 2024 10:06), Max: 36.8 (16 Jul 2024 20:12)  T(F): 98.2 (17 Jul 2024 08:59), Max: 98.2 (16 Jul 2024 20:12)  HR: 93 (17 Jul 2024 10:06) (53 - 93)  BP: 106/73 (17 Jul 2024 10:06) (99/72 - 112/74)  BP(mean): 81 (17 Jul 2024 10:06) (81 - 81)  RR: 18 (17 Jul 2024 10:06) (18 - 18)  SpO2: 95% (17 Jul 2024 10:06) (95% - 98%)    Parameters below as of 17 Jul 2024 10:06    O2 Flow (L/min): 2    Daily Height in cm: 165.1 (17 Jul 2024 10:06)    Daily   GENERAL:  NAD, Appears stated age  HEENT:  NC/AT,  conjunctivae clear and pink, sclera -anicteric  CHEST:  Normal Effort, no signs of resp distress  HEART:  RRR, HD stable  ABDOMEN:  Soft, non-tender, non-distended  EXTREMITIES:  no cyanosis or edema  SKIN:  Warm & Dry. No rash or erythema  NEURO:  Alert, oriented, no focal deficit  LABS:    Last Hb:Hemoglobin: 12.0 g/dL (07-16-24 @ 07:27)               141   |  105   |  16                 Ca: 8.9    BMP:   ----------------------------< 113    Mg: x     (07-17-24 @ 06:23)             4.2    |  25    | 0.60               Ph: x        LFT:     TPro: 6.0 / Alb: 3.3 / TBili: 0.7 / DBili: x / AST: 38 / ALT: 107 / AlkPhos: 84   (07-17-24 @ 06:23)    Creatinine: 0.60 mg/dL  Creatinine: 0.63 mg/dL  Creatinine: 0.62 mg/dL      LIVER FUNCTIONS - ( 17 Jul 2024 06:23 )  Alb: 3.3 g/dL / Pro: 6.0 g/dL / ALK PHOS: 84 U/L / ALT: 107 U/L / AST: 38 U/L / GGT: x             Urinalysis Basic - ( 17 Jul 2024 06:23 )    Color: x / Appearance: x / SG: x / pH: x  Gluc: 113 mg/dL / Ketone: x  / Bili: x / Urobili: x   Blood: x / Protein: x / Nitrite: x   Leuk Esterase: x / RBC: x / WBC x   Sq Epi: x / Non Sq Epi: x / Bacteria: x        Amylase [id number]      Lipase [id number]       Ammonia--

## 2024-07-17 NOTE — PROGRESS NOTE ADULT - PROBLEM SELECTOR PROBLEM 3
Thyroid goiter
Elevated TSH

## 2024-07-17 NOTE — PROGRESS NOTE ADULT - ATTENDING COMMENTS
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  Postop course complicated by diabetes insipidus, now with normal urine output without DDAVP replacement.  Patient has been getting Decadron 2 mg every 12 hours since admission on 7/4/2024, planning for a Decadron taper per neurosurgery team, patient will follow-up with outpatient endocrinologist to reassess the HPA axis as she had been on steroid for more than 2 weeks.  For thyroid goiter, continue to follow-up with thyroid ultrasound outpatient.  For hyperlipidemia, continue with statin therapy.    Please ensure close follow-up with neurosurgery and outpatient endocrinology upon discharge.

## 2024-07-17 NOTE — PROGRESS NOTE ADULT - ASSESSMENT
44F hx of thyroid goiter, HLD presenting for progressive HAs/L eye vision deficit. 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 Endocrine consulted for: Sellar mass, possibly pituitary adenoma    #Sellar mass, possibly pituitary adenoma  - 1.9cm sellar mass with L optic nerve compression with visual deficits with associated elevation in prolactin 153, diluted prolactin 108. Mildly elevated TSH 6.40, FT4 1.0, 2am cortisol 4.0, estradiol 36, LH 7.4, FSH 9.4, . TPO Ab <10  - Suspect that this is likely non-functioning pituitary adenoma given most pituitary hormone levels without significant abnormalities.  - s/p resection 7/11, denies any symptoms post-op  - post op TFTs wnl (7/13/24: TSH 1.0, FT4 1.4), unable to assess AM cortisol since patient was started on dexamethasone 4mg IV q6h since 5am 7/4. Now tapered down to 4mg q12h.  - 7/13/24: Urine output increased to 400cc/hr w/ low urine osm/spec gravity with Na 144 - s/p DDAVP 0.05mg at 12pm, required another dose at on 7/13/24 at 10pm   - thus far patient with 2 doses of DDAVP (7/13/24 12pm and 10pm)  - 7/15/24: UOP improving ~150-200cc/hr  - 7/16/24: UOP stable with periods of increased output. Na 141 (from 144 yesterday)     Recommendations  - c/w DI watch  - Patient tapered to Decadron 2 mg q12. Unable to assess HPA axis while on steroids  - Will obtain AM cortisol once fully tapered off dexamethasone (per NSGY, planning to taper off completely this admission)  - may repeat other pituitary labs when patient as OP: IGF-1, LH, FSH, prolactin    *DI WATCH*:  - Goal Na 140-145  - Please monitor strict I/O, sodium levels q12h, and urine osm q12hrs  - DI is suspected if UOP is >250cc/hr x 2 consecutive hours or if >500cc/hr x 1 hr - if this occurs please check STAT serum Na, urine osm, urine specific gravity  - if specific gravity <1.005/Urine Osm <300 and Na is rising - likely DI, please dose DDAVP 0.05mg PO x1 (or DDAVP 0.5mcg IV if no PO access) and bolus 1/2 NS to match half the output (for example, if net negative 2Liters can give 1Liter 1/2NS if pt is unable to keep up with output with PO intake) - continue DI watch  - If Na continues to increase despite a a DDAVP dose and 1/2NS fluid bolus please inform endocrine   - Please make sure pt has access to water and encourage her to drink to thirst     #Thyroid goiter  She has thyroid goiter for which she follows up with Endocrinologist Dr. Espino ([facility]) since 2021. Only been monitoring with US.   - Follow up outpatient with Dr. Espino    #HLD  - , from 96 in June  - c/w atorva 20 for now      **************************RECOMMENDATIONS NOT FINAL UNTIL SIGNED BY ATTENDING**************************  44F hx of thyroid goiter, HLD presenting for progressive HAs/L eye vision deficit. 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 Endocrine consulted for: Sellar mass, possibly pituitary adenoma    #Sellar mass, possibly pituitary adenoma  - 1.9cm sellar mass with L optic nerve compression with visual deficits with associated elevation in prolactin 153, diluted prolactin 108. Mildly elevated TSH 6.40, FT4 1.0, 2am cortisol 4.0, estradiol 36, LH 7.4, FSH 9.4, . TPO Ab <10  - Suspect that this is likely non-functioning pituitary adenoma given most pituitary hormone levels without significant abnormalities.  - s/p resection 7/11, denies any symptoms post-op  - post op TFTs wnl (7/13/24: TSH 1.0, FT4 1.4), unable to assess AM cortisol since patient was started on dexamethasone 4mg IV q6h since 5am 7/4. Now tapered down to 4mg q12h.  - 7/13/24: Urine output increased to 400cc/hr w/ low urine osm/spec gravity with Na 144 - s/p DDAVP 0.05mg at 12pm, required another dose at on 7/13/24 at 10pm   - thus far patient with 2 doses of DDAVP (7/13/24 12pm and 10pm)  - 7/15/24: UOP improving ~150-200cc/hr  - 7/16/24: UOP stable with periods of increased output. Na 141 (from 144 yesterday)     Recommendations  - c/w DI watch while inpatient  - Patient tapered to Decadron 2 mg q12. Unable to assess HPA axis while on steroids   - Will obtain AM cortisol once fully tapered off dexamethasone  - May repeat other pituitary labs when patient as OP: IGF-1, LH, FSH, prolactin  - If patient to be d/c today, please ensure she is continued on Decadron taper until Monday 7/22/24, when she has a scheduled appointment with Dr. Espino (can send on 2 mg bid 7/17, 2 mg bid 7/18, 2 mg daily 7/19, 1 mg q daily 7/20, 1 mg q daily 7/21)      *DI WATCH*:  - Goal Na 140-145  - Please monitor strict I/O, sodium levels q12h, and urine osm q12hrs  - DI is suspected if UOP is >250cc/hr x 2 consecutive hours or if >500cc/hr x 1 hr - if this occurs please check STAT serum Na, urine osm, urine specific gravity  - if specific gravity <1.005/Urine Osm <300 and Na is rising - likely DI, please dose DDAVP 0.05mg PO x1 (or DDAVP 0.5mcg IV if no PO access) and bolus 1/2 NS to match half the output (for example, if net negative 2Liters can give 1Liter 1/2NS if pt is unable to keep up with output with PO intake) - continue DI watch  - If Na continues to increase despite a a DDAVP dose and 1/2NS fluid bolus please inform endocrine   - Please make sure pt has access to water and encourage her to drink to thirst     #Thyroid goiter  She has thyroid goiter for which she follows up with Endocrinologist Dr. Espino ([facility]) since 2021. Only been monitoring with US.   - Follow up outpatient with Dr. Espino    #HLD  - , from 96 in June  - c/w atorva 20 for now      D/w Dr. Tee and primary team

## 2024-07-17 NOTE — PROGRESS NOTE ADULT - TIME BILLING
chart review, d/w pt/sebastian, exam, note
chart review, d/w pt/daughter/neurosrug, exam, note, orders
pituitary surgery

## 2024-07-17 NOTE — PROGRESS NOTE ADULT - ASSESSMENT
45 y/o F, POD #6 from TSRP with high flow leak, repaired with duragen inlay, duraguard button, L nasoseptal flap. Pt seen and examined at bedside, c/o serosanguinous discharge from nares and blood clots in throat initially but none today. also c/o nasal discomfort. Pt doing well. No evidence of CSF leak or epistaxis on exam.

## 2024-07-17 NOTE — PROGRESS NOTE ADULT - ASSESSMENT
44F hx of HLD [place] speaking ([facility], [facility] xfer) p/f progressive HAs/L eye blurry vision and headache, CTH w/ 1.9cm sellar/suprasellar mass asymmetric to the Lt, c/f pituitary adenoma w/ L optic nerve comp. s/p Transsphenoidal resection of pituitary tumor on 7/11 c/w DI.

## 2024-07-17 NOTE — PROGRESS NOTE ADULT - SUBJECTIVE AND OBJECTIVE BOX
ENT ISSUE/POD:    HPI: 44y s/p TSRP with high flow leak, repaired with duragen inlay, duraguard button, L nasoseptal flap on 7/11/24 POD #6     HPI: 45 y/o F, POD #6 from TSRP with high flow leak, repaired with duragen inlay, duraguard button, L nasoseptal flap. Pt seen and examined at bedside, c/o serosanguinous discharge from nares and blood clots in throat initially but none today. also c/o nasal discomfort. Denies salty or metallic taste, post nasal drip, epistaxis, nasal discharge, headache or pain.           PAST MEDICAL & SURGICAL HISTORY:  Hyperlipidemia      H/O: hysterectomy        Allergies    No Known Allergies    Intolerances      MEDICATIONS  (STANDING):  atorvastatin 20 milliGRAM(s) Oral at bedtime  bisacodyl 5 milliGRAM(s) Oral at bedtime  dexAMETHasone     Tablet   Oral   dexAMETHasone     Tablet 2 milliGRAM(s) Oral every 12 hours  enoxaparin Injectable 40 milliGRAM(s) SubCutaneous <User Schedule>  famotidine    Tablet 20 milliGRAM(s) Oral every 12 hours  insulin lispro (ADMELOG) corrective regimen sliding scale   SubCutaneous Before meals and at bedtime  lactated ringers. 1000 milliLiter(s) (75 mL/Hr) IV Continuous <Continuous>  pantoprazole    Tablet 40 milliGRAM(s) Oral every 12 hours  petrolatum Ophthalmic Ointment 1 Application(s) Both EYES at bedtime  polyethylene glycol 3350 17 Gram(s) Oral two times a day  senna 2 Tablet(s) Oral at bedtime  sodium chloride 0.65% Nasal 2 Spray(s) Both Nostrils four times a day    MEDICATIONS  (PRN):  acetaminophen     Tablet .. 975 milliGRAM(s) Oral every 6 hours PRN Temp greater or equal to 38C (100.4F), Moderate Pain (4 - 6)  artificial tears (preservative free) Ophthalmic Solution 1 Drop(s) Both EYES five times a day PRN Dry Eyes  bisacodyl 5 milliGRAM(s) Oral daily PRN Constipation  calcium carbonate    500 mG (Tums) Chewable 1 Tablet(s) Chew every 6 hours PRN Indigestion  ondansetron Injectable 4 milliGRAM(s) IV Push every 6 hours PRN Nausea and/or Vomiting      Social History: see consult    Family history: see consult    ROS:   ENT: all negative except as noted in HPI   Pulm: denies SOB, cough, hemoptysis  Neuro: denies numbness/tingling, loss of sensation  Endo: denies heat/cold intolerance, excessive sweating      Vital Signs Last 24 Hrs  T(C): 36.8 (17 Jul 2024 10:06), Max: 36.8 (16 Jul 2024 20:12)  T(F): 98.2 (17 Jul 2024 08:59), Max: 98.2 (16 Jul 2024 20:12)  HR: 93 (17 Jul 2024 10:06) (53 - 93)  BP: 106/73 (17 Jul 2024 10:06) (99/72 - 112/74)  BP(mean): 81 (17 Jul 2024 10:06) (81 - 81)  RR: 18 (17 Jul 2024 10:06) (18 - 18)  SpO2: 95% (17 Jul 2024 10:06) (95% - 98%)    Parameters below as of 17 Jul 2024 10:06    O2 Flow (L/min): 2                            12.0   13.55 )-----------( 227      ( 16 Jul 2024 07:27 )             37.3    07-17    141  |  105  |  16  ----------------------------<  113<H>  4.2   |  25  |  0.60    Ca    8.9      17 Jul 2024 06:23    TPro  6.0  /  Alb  3.3  /  TBili  0.7  /  DBili  x   /  AST  38  /  ALT  107<H>  /  AlkPhos  84  07-17       PHYSICAL EXAM:  Gen: NAD  Skin: No rashes, bruises, or lesions  Head: Normocephalic, Atraumatic  Face: no edema, erythema, or fluctuance. Parotid glands soft without mass  Eyes: no scleral injection  Nose: b/l nares- dried blood. no epistaxis or nasal discharge  Mouth: No Stridor / Drooling / Trismus.  Mucosa moist, tongue/uvula midline, oropharynx clear  Neck: Flat, supple, no lymphadenopathy, trachea midline, no masses  Lymphatic: No lymphadenopathy  Resp: breathing easily, no stridor  Neuro: facial nerve intact, no facial droop

## 2024-07-17 NOTE — PROGRESS NOTE ADULT - PROBLEM SELECTOR PLAN 1
- continue humidified face tent  - nasal saline, 2 sprays to b/l nares 4 times a day.  - monitor for signs of Epistaxis and CSF leak  - TSRP precautions (no straws, incentive spirometry, bipap)   - avoid nasal trauma, heavy lifting and bending at waist.  - Care a/p neurosurgery.  - ENT will continue to follow, please call with questions or concerns x [phone].
- continue humidified face tent  - nasal saline, 2 sprays to b/l nares 4 times a day.  - monitor for signs of Epistaxis and CSF leak  - TSRP precautions (no straws, incentive spirometry, bipap)   - avoid nasal trauma, heavy lifting and bending at waist.  - Care a/p neurosurgery.  - ENT will continue to follow, please call with questions or concerns x [phone].
may be dyspepsa/gastritis from dexamethasone but will check cpk/trop and amylase/lipase/LFTs/RUQ sono for completeness. d/w pt/daughter
- continue humidified face tent  - f/u repeat MRI 7/12  - nasal saline, 2 sprays to b/l nares 4 times a day.  - monitor for signs of Epistaxis and CSF leak  - avoid nasal trauma, heavy lifting and bending at waist.  - Care a/p neurosurgery
- continue humidified face tent  - nasal saline, 2 sprays to b/l nares 4 times a day.  - monitor for signs of Epistaxis and CSF leak  - TSRP precautions (no straws, incentive spirometry, bipap)   - avoid nasal trauma, heavy lifting and bending at waist.  - Care a/p neurosurgery.  - ENT will continue to follow, please call with questions or concerns x [phone].
- continue humidified face tent  - nasal saline, 2 sprays to b/l nares 4 times a day.  - monitor for signs of Epistaxis and CSF leak  - TSRP precautions (no straws, incentive spirometry, bipap)   - avoid nasal trauma, heavy lifting and bending at waist.  - Care a/p neurosurgery.  - ENT will continue to follow, please call with questions or concerns x [phone].
- f/u repeat MRI 7/12  - continue humidified face tent  - nasal saline, 2 sprays to b/l nares 4 times a day.  - monitor for signs of Epistaxis and CSF leak  - TSRP precautions (no straws, incentive spirometry, bipap)   - avoid nasal trauma, heavy lifting and bending at waist.  - Care a/p neurosurgery.  - ENT will continue to follow, please call with questions or concerns x [phone].
- f/u repeat MRI 7/12  - continue humidified face tent  - nasal saline, 2 sprays to b/l nares 4 times a day.  - monitor for signs of Epistaxis and CSF leak  - TSRP precautions (no straws, incentive spirometry, bipap)   - avoid nasal trauma, heavy lifting and bending at waist.  - Care a/p neurosurgery.  - ENT will continue to follow, please call with questions or concerns x [phone].
apprec GI input. pt and daughter were counseled to f/u with GI within 2 weeks of discharge or sooner if sx worsen

## 2024-07-17 NOTE — PROGRESS NOTE ADULT - ASSESSMENT
ASSESSMENT AND PLAN: 44F Syrian speaking ([facility] xfer) p/f progressive HAs/L eye blurry vision/short term mem loss x1mo, w/ acutely worse Lt side HA x2d. At 11AM yesterday suddenly could only see figures/shadows from Lt eye. Optho noted Lt optic nn. dysfxn/no papilledema. 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 No abnormal enhancement is seen.  - ENT following: Skullbase precautions: no straws, no nose blowing, avoid nasal trauma, no nasal cannula, no incentive spirometry; continue nasal saline spray. Patient to follow with Dr. De Santiago outpatient  - Ophthalmology saw patient pre-op, appreciate their recs. as patient was complaining of foreign body sensation over her right eye - per their evaluation: DDx for FBS includes dry eye vs foreign body vs pterygium. Less likely foreign body as no evidence of eye irritation or corneal abrasion or ulcer noted. May also be dryness related to white limbal lesion with inadequate tear film distribution. Etiology of white limbal lesion unclear at this time. May be suggestive of pterygium vs JOSE vs papilloma vs Dry eye. Patient likely Low concern for corneal ulcer at this time. Was placed on Lacri-lube and continue Artificial tears. Will need re-evaluation in 1 week if any changes in size of lesion. No complaints per patient.   - Pain control w/ Tylenol prn and Oxycodone prn  - PT/OT: outpatient PT/OT    PULM:   - On room air, O2Sat>95%  - No incentive spirometry as patient had transphenoidal surgery  - No nasal cannula, no CPAP/BIPAP/High flow; face tent only if needed (patient requested as she was feeling dry in her nares)    CV:  - -160  - Continue Lipitor for HLD  - 7/15 EKG done WNL  - 7/16 Trops negative, CPK WNL  - 7/16 Lipid Panel WNL    ENDO:   - Appreciate Endocrine following, awaiting discharge recommendations   - Continue to monitor for DI - last Na 141, specific grav 1.009 and urine osm 348. No DI.   - Currently on steroid taper, per Endo to check AM cortisol once off steroids. Patient and daughters know that patient will need follow up with her endocrinologist to have her AM cortisol checked soon as possible. They are currently making an appointment.   - I discussed signs and symptoms of adrenal insufficiency to monitor for: N/V, abdominal pain and dizziness  - Patient also has a thyroid goiter for which she follows up with Endocrinologist Dr. Espino ([facility]) since 2021. Only been monitoring with US. Follow up outpatient with Dr. Espino. Endocrine to follow after surgery.     HEME/ONC:             7/16 CBC stable, mild leukocytosis likely from steroids         DVT ppx: SQL, SCDs, 7/4 Le Dopp negative    RENAL:   - IVL  - 7/16 BMP stable  - Voiding    ID:   - Afebrile  - Received post-op abx  - Was Bactroban for +MSSA nares 7/4, finished 7/9    GI:    - Continue oral diet  - Senna and Miralax for bowel regimen, last BM 7/14  - C/o epigastric pain - Amylase and Lipase elevated, US Abd: WNL  - GI following: suspect erosive gastropathy in the setting of steroids, and elevated Lipase can occur as a consequence of PUD. Protonix BID x 2 weeks, then daily after, hold of endoscopy at this time and to follow outpatient.   - 7/16 LFTs mildly elevated, but on repeat on 7/17 improved  - Continue Maalox, and Tums  - Continue Protonix for GI ppx while on steroids    Appreciate Internal Medicine following for co-medical management. Patient has a primary care doctor that she will follow with Dr. Welch    DISCHARGE PLANNING:   Outpatient PT/OT, awaiting Endocrine d/c recommendations    Plan to be discussed w/ Dr. Goncalves  [id number]    ASSESSMENT AND PLAN: 44F Kittitian speaking ([facility] xfer) p/f progressive HAs/L eye blurry vision/short term mem loss x1mo, w/ acutely worse Lt side HA x2d. At 11AM yesterday suddenly could only see figures/shadows from Lt eye. Optho noted Lt optic nn. dysfxn/no papilledema. 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 No abnormal enhancement is seen.  - ENT following: Skullbase precautions: no straws, no nose blowing, avoid nasal trauma, no nasal cannula, no incentive spirometry; continue nasal saline spray. Patient to follow with Dr. De Santiago outpatient  - Ophthalmology saw patient pre-op, appreciate their recs. as patient was complaining of foreign body sensation over her right eye - per their evaluation: DDx for FBS includes dry eye vs foreign body vs pterygium. Less likely foreign body as no evidence of eye irritation or corneal abrasion or ulcer noted. May also be dryness related to white limbal lesion with inadequate tear film distribution. Etiology of white limbal lesion unclear at this time. May be suggestive of pterygium vs JOSE vs papilloma vs Dry eye. Patient likely Low concern for corneal ulcer at this time. Was placed on Lacri-lube and continue Artificial tears. Will need re-evaluation in 1 week if any changes in size of lesion. No complaints per patient.   - Pain control w/ Tylenol prn  - PT/OT: outpatient PT/OT    PULM:   - On room air, O2Sat>95%  - No incentive spirometry as patient had transphenoidal surgery  - No nasal cannula, no CPAP/BIPAP/High flow; face tent only if needed (patient requested as she was feeling dry in her nares)    CV:  - -160  - Continue Lipitor for HLD  - 7/15 EKG done WNL  - 7/16 Trops negative, CPK WNL  - 7/16 Lipid Panel WNL    ENDO:   - Appreciate Endocrine following, awaiting discharge recommendations   - Continue to monitor for DI - last Na 141, specific grav 1.009 and urine osm 348. No DI.   - Currently on steroid taper, per Endo to check AM cortisol once off steroids. Patient and daughters know that patient will need follow up with her endocrinologist to have her AM cortisol checked soon as possible. They are currently making an appointment.   - I discussed signs and symptoms of adrenal insufficiency to monitor for: N/V, abdominal pain and dizziness  - Patient also has a thyroid goiter for which she follows up with Endocrinologist Dr. Espino ([facility]) since 2021. Only been monitoring with US. Follow up outpatient with Dr. Espino. Endocrine to follow after surgery.     HEME/ONC:             7/16 CBC stable, mild leukocytosis likely from steroids         DVT ppx: SQL, SCDs, 7/4 Le Dopp negative    RENAL:   - IVL  - 7/16 BMP stable  - Voiding    ID:   - Afebrile  - Received post-op abx  - Was Bactroban for +MSSA nares 7/4, finished 7/9    GI:    - Continue oral diet  - Senna and Miralax for bowel regimen, last BM 7/14  - C/o epigastric pain - Amylase and Lipase elevated, US Abd: WNL  - GI following: suspect erosive gastropathy in the setting of steroids, and elevated Lipase can occur as a consequence of PUD. Protonix BID x 2 weeks, then daily after, hold of endoscopy at this time and to follow outpatient.   - 7/16 LFTs mildly elevated, but on repeat on 7/17 improved  - Continue Maalox, and Tums  - Continue Protonix for GI ppx while on steroids    Appreciate Internal Medicine following for co-medical management. Patient has a primary care doctor that she will follow with Dr. Welch    DISCHARGE PLANNING:   Outpatient PT/OT, awaiting Endocrine d/c recommendations    Plan to be discussed w/ Dr. Goncalves  [id number]    ASSESSMENT AND PLAN: 44F Northern Irish speaking ([facility] xfer) p/f progressive HAs/L eye blurry vision/short term mem loss x1mo, w/ acutely worse Lt side HA x2d. At 11AM yesterday suddenly could only see figures/shadows from Lt eye. Optho noted Lt optic nn. dysfxn/no papilledema. 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 No abnormal enhancement is seen.  - ENT following: Skullbase precautions: no straws, no nose blowing, avoid nasal trauma, no nasal cannula, no incentive spirometry; continue nasal saline spray. Patient to follow with Dr. De Santiago outpatient  - Ophthalmology saw patient pre-op, appreciate their recs. as patient was complaining of foreign body sensation over her right eye - per their evaluation: DDx for FBS includes dry eye vs foreign body vs pterygium. Less likely foreign body as no evidence of eye irritation or corneal abrasion or ulcer noted. May also be dryness related to white limbal lesion with inadequate tear film distribution. Etiology of white limbal lesion unclear at this time. May be suggestive of pterygium vs JOSE vs papilloma vs Dry eye. Patient likely Low concern for corneal ulcer at this time. Was placed on Lacri-lube and continue Artificial tears. Will need re-evaluation in 1 week if any changes in size of lesion. No complaints per patient.   - Pain control w/ Tylenol prn  - PT/OT: outpatient PT/OT    PULM:   - On room air, O2Sat>95%  - No incentive spirometry as patient had transphenoidal surgery  - No nasal cannula, no CPAP/BIPAP/High flow; face tent only if needed (patient requested as she was feeling dry in her nares)    CV:  - -160  - Continue Lipitor for HLD  - 7/15 EKG done WNL  - 7/16 Trops negative, CPK WNL  - 7/16 Lipid Panel WNL    ENDO:   - Appreciate Endocrine following, spoke with Endocrine, patient to continue steroids (2mg BID until Thursday evening, 2mg daily on Friday and 1mg daily over the weekend) until patient sees Dr. Taveras on her appointment on Monday 7/22   - Continue to monitor for DI - last Na 141, specific grav 1.009 and urine osm 348. No DI.   - Currently on steroid taper, per Endo to check AM cortisol once off steroids. Patient and daughters know that patient will need follow up with her endocrinologist to have her AM cortisol checked soon as possible. They are currently making an appointment.   - I discussed signs and symptoms of adrenal insufficiency to monitor for: N/V, abdominal pain and dizziness, if these symptoms occur patient to contact Dr. Espino immediately.   - Patient also has a thyroid goiter for which she follows up with Endocrinologist Dr. Espino ([facility]) since 2021. Only been monitoring with US. Follow up outpatient with Dr. Espino. Endocrine to follow after surgery.     HEME/ONC:             7/16 CBC stable, mild leukocytosis likely from steroids         DVT ppx: SQL, SCDs, 7/4 Le Dopp negative    RENAL:   - IVL  - 7/16 BMP stable  - Voiding    ID:   - Afebrile  - Received post-op abx  - Was Bactroban for +MSSA nares 7/4, finished 7/9    GI:    - Continue oral diet  - Senna and Miralax for bowel regimen, last BM 7/14  - C/o epigastric pain - Amylase and Lipase elevated, US Abd: WNL  - GI following: suspect erosive gastropathy in the setting of steroids, and elevated Lipase can occur as a consequence of PUD. Protonix BID x 2 weeks, then daily after, hold of endoscopy at this time and to follow outpatient.   - 7/16 LFTs mildly elevated, but on repeat on 7/17 improved  - Continue Maalox, and Tums  - Continue Protonix for GI ppx while on steroids    Appreciate Internal Medicine following for co-medical management. Patient has a primary care doctor that she will follow with Dr. Welch    DISCHARGE PLANNING:   Outpatient PT/OT, awaiting Endocrine d/c recommendations    Plan to be discussed w/ Dr. Goncalves  [id number]

## 2024-07-17 NOTE — PROGRESS NOTE ADULT - SUBJECTIVE AND OBJECTIVE BOX
SUBJECTIVE: HPI:  44F Iranian speaking ([facility] xfer) p/f progressive HAs/L eye blurry vision/short term mem loss x1mo, w/ acutely worse Lt side HA x2d. At 11AM yesterday suddenly could only see figures/shadows from Lt eye. Optho noted Lt optic nn. dysfxn/no papilledema. 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 Exam: Wide awake, Ox3, pupils dilated by optho, VFF to finger counting b/l but Lt eye can only see shadows/figure outlines, no drift o/w intact  (04 Jul 2024 02:28)      OVERNIGHT EVENTS: No acute events overnight, patient seen and evaluated at bedside this morning with her daughter in the room. Declined  and prefers her daughter to translate for her. Reports resolution of epigastric pain, expresses desire to go home today. Daughter at bedside making appointment with Endocrinologist Dr. Espino for patient to be soon as possible. Awaiting final Endocrine recommendations for discharge, spoke with them this morning they will see patient.     Vital Signs Last 24 Hrs  T(C): 36.8 (17 Jul 2024 10:06), Max: 36.8 (16 Jul 2024 20:12)  T(F): 98.2 (17 Jul 2024 08:59), Max: 98.2 (16 Jul 2024 20:12)  HR: 93 (17 Jul 2024 10:06) (53 - 93)  BP: 106/73 (17 Jul 2024 10:06) (99/72 - 112/74)  BP(mean): 81 (17 Jul 2024 10:06) (81 - 81)  RR: 18 (17 Jul 2024 10:06) (18 - 18)  SpO2: 95% (17 Jul 2024 10:06) (95% - 98%)    Parameters below as of 17 Jul 2024 10:06    O2 Flow (L/min): 2      PHYSICAL EXAM:    General: No Acute Distress     Neurological: Awake, Ox3, pupils 3mm react b/l, visual fields full though she reports blurriness though she reports overall improved from her pre-op symptoms, following commands, uppers 5/5, no drift, lowers 5/5, SILT    Pulmonary: Clear to Auscultation, No Rales, No Rhonchi, No Wheezes     Cardiovascular: S1, S2, Regular Rate and Rhythm     Gastrointestinal: Soft, Nontender, Nondistended     Incision: no clear fluid in nares, no salt taste    LABS:                        12.0   13.55 )-----------( 227      ( 16 Jul 2024 07:27 )             37.3    07-17    141  |  105  |  16  ----------------------------<  113<H>  4.2   |  25  |  0.60    Ca    8.9      17 Jul 2024 06:23    TPro  6.0  /  Alb  3.3  /  TBili  0.7  /  DBili  x   /  AST  38  /  ALT  107<H>  /  AlkPhos  84  07-17 07-16 @ 07:01 - 07-17 @ 07:00  --------------------------------------------------------  IN: 2560 mL / OUT: 4150 mL / NET: -1590 mL    07-17 @ 07:01  - 07-17 @ 10:38  --------------------------------------------------------  IN: 585 mL / OUT: 900 mL / NET: -315 mL      DRAINS: None    MEDICATIONS:  Antibiotics:    Neuro:  acetaminophen     Tablet .. 975 milliGRAM(s) Oral every 6 hours PRN Temp greater or equal to 38C (100.4F), Moderate Pain (4 - 6)  ondansetron Injectable 4 milliGRAM(s) IV Push every 6 hours PRN Nausea and/or Vomiting    Cardiac:    Pulm:    GI/:  bisacodyl 5 milliGRAM(s) Oral at bedtime  bisacodyl 5 milliGRAM(s) Oral daily PRN Constipation  calcium carbonate    500 mG (Tums) Chewable 1 Tablet(s) Chew every 6 hours PRN Indigestion  pantoprazole    Tablet 40 milliGRAM(s) Oral every 12 hours  polyethylene glycol 3350 17 Gram(s) Oral two times a day  senna 2 Tablet(s) Oral at bedtime    Other:   artificial tears (preservative free) Ophthalmic Solution 1 Drop(s) Both EYES five times a day PRN Dry Eyes  atorvastatin 20 milliGRAM(s) Oral at bedtime  dexAMETHasone     Tablet   Oral   dexAMETHasone     Tablet 2 milliGRAM(s) Oral every 12 hours  enoxaparin Injectable 40 milliGRAM(s) SubCutaneous <User Schedule>  insulin lispro (ADMELOG) corrective regimen sliding scale   SubCutaneous Before meals and at bedtime  lactated ringers. 1000 milliLiter(s) IV Continuous <Continuous>  petrolatum Ophthalmic Ointment 1 Application(s) Both EYES at bedtime  sodium chloride 0.65% Nasal 2 Spray(s) Both Nostrils four times a day    DIET: [] Regular [x] CCD [] Renal [] Puree [] Dysphagia [] Tube Feeds:     IMAGING:   < from: MR Head w/wo IV Cont (07.12.24 @ 15:17) >  IMPRESSION:        1.   Interval resection of pituitary macroadenoma with minimal   residual in the medial LEFT cavernous sinus. Postoperative changes   include hemorrhage and postoperative material. There is minimal mass   effect on the LEFT optic chiasm. Packing material and   hemorrhage/secretions are seen within the sphenoid cavity.        2.   Scattered punctate foci of FLAIR signal hyperintensity in the   bifrontal and biparietal deep white matter again noted. No abnormal   enhancement is seen..    --- End of Report ---      GISSELLE ASKEW MD; Attending Radiologist  This document has been electronically signed. Jul 13 2024 11:18AM    < end of copied text >

## 2024-07-23 RX ORDER — ATORVASTATIN CALCIUM 20 MG/1
1 TABLET, FILM COATED ORAL
Refills: 0 | DISCHARGE

## 2024-07-25 ENCOUNTER — EMERGENCY (EMERGENCY)
Facility: HOSPITAL | Age: 44
LOS: 1 days | Discharge: HOME | End: 2024-07-25
Attending: EMERGENCY MEDICINE
Payer: COMMERCIAL

## 2024-07-25 ENCOUNTER — TRANSCRIPTION ENCOUNTER (OUTPATIENT)
Age: 44
End: 2024-07-25

## 2024-07-25 VITALS
DIASTOLIC BLOOD PRESSURE: 73 MMHG | SYSTOLIC BLOOD PRESSURE: 105 MMHG | RESPIRATION RATE: 18 BRPM | HEART RATE: 75 BPM | OXYGEN SATURATION: 100 % | TEMPERATURE: 98 F

## 2024-07-25 VITALS
WEIGHT: 115.96 LBS | OXYGEN SATURATION: 100 % | RESPIRATION RATE: 20 BRPM | TEMPERATURE: 98 F | HEART RATE: 95 BPM | HEIGHT: 65 IN | SYSTOLIC BLOOD PRESSURE: 113 MMHG | DIASTOLIC BLOOD PRESSURE: 82 MMHG

## 2024-07-25 DIAGNOSIS — Z90.710 ACQUIRED ABSENCE OF BOTH CERVIX AND UTERUS: Chronic | ICD-10-CM

## 2024-07-25 DIAGNOSIS — R09.89 OTHER SPECIFIED SYMPTOMS AND SIGNS INVOLVING THE CIRCULATORY AND RESPIRATORY SYSTEMS: ICD-10-CM

## 2024-07-25 PROBLEM — E78.5 HYPERLIPIDEMIA, UNSPECIFIED: Chronic | Status: ACTIVE | Noted: 2024-07-04

## 2024-07-25 LAB
ALBUMIN SERPL ELPH-MCNC: 3.9 G/DL — SIGNIFICANT CHANGE UP (ref 3.3–5)
ALP SERPL-CCNC: 252 U/L — HIGH (ref 40–120)
ALT FLD-CCNC: 343 U/L — HIGH (ref 10–45)
ANION GAP SERPL CALC-SCNC: 11 MMOL/L — SIGNIFICANT CHANGE UP (ref 5–17)
APPEARANCE UR: CLEAR — SIGNIFICANT CHANGE UP
AST SERPL-CCNC: 106 U/L — HIGH (ref 10–40)
BACTERIA # UR AUTO: ABNORMAL /HPF
BASOPHILS # BLD AUTO: 0.02 K/UL — SIGNIFICANT CHANGE UP (ref 0–0.2)
BASOPHILS NFR BLD AUTO: 0.2 % — SIGNIFICANT CHANGE UP (ref 0–2)
BILIRUB SERPL-MCNC: 0.8 MG/DL — SIGNIFICANT CHANGE UP (ref 0.2–1.2)
BILIRUB UR-MCNC: NEGATIVE — SIGNIFICANT CHANGE UP
BUN SERPL-MCNC: 8 MG/DL — SIGNIFICANT CHANGE UP (ref 7–23)
CALCIUM SERPL-MCNC: 9.8 MG/DL — SIGNIFICANT CHANGE UP (ref 8.4–10.5)
CAST: 1 /LPF — SIGNIFICANT CHANGE UP (ref 0–4)
CHLORIDE SERPL-SCNC: 105 MMOL/L — SIGNIFICANT CHANGE UP (ref 96–108)
CO2 SERPL-SCNC: 25 MMOL/L — SIGNIFICANT CHANGE UP (ref 22–31)
COLOR SPEC: YELLOW — SIGNIFICANT CHANGE UP
CREAT SERPL-MCNC: 0.75 MG/DL — SIGNIFICANT CHANGE UP (ref 0.5–1.3)
DIFF PNL FLD: NEGATIVE — SIGNIFICANT CHANGE UP
EGFR: 99 ML/MIN/1.73M2 — SIGNIFICANT CHANGE UP
EGFR: 99 ML/MIN/1.73M2 — SIGNIFICANT CHANGE UP
EOSINOPHIL # BLD AUTO: 0.21 K/UL — SIGNIFICANT CHANGE UP (ref 0–0.5)
EOSINOPHIL NFR BLD AUTO: 2.2 % — SIGNIFICANT CHANGE UP (ref 0–6)
GLUCOSE SERPL-MCNC: 94 MG/DL — SIGNIFICANT CHANGE UP (ref 70–99)
GLUCOSE UR QL: NEGATIVE MG/DL — SIGNIFICANT CHANGE UP
HCG SERPL-ACNC: <2 MIU/ML — SIGNIFICANT CHANGE UP
HCT VFR BLD CALC: 41.3 % — SIGNIFICANT CHANGE UP (ref 34.5–45)
HGB BLD-MCNC: 13.1 G/DL — SIGNIFICANT CHANGE UP (ref 11.5–15.5)
IMM GRANULOCYTES NFR BLD AUTO: 0.7 % — SIGNIFICANT CHANGE UP (ref 0–0.9)
KETONES UR-MCNC: NEGATIVE MG/DL — SIGNIFICANT CHANGE UP
LEUKOCYTE ESTERASE UR-ACNC: NEGATIVE — SIGNIFICANT CHANGE UP
LYMPHOCYTES # BLD AUTO: 1.58 K/UL — SIGNIFICANT CHANGE UP (ref 1–3.3)
LYMPHOCYTES # BLD AUTO: 16.8 % — SIGNIFICANT CHANGE UP (ref 13–44)
MCHC RBC-ENTMCNC: 29.9 PG — SIGNIFICANT CHANGE UP (ref 27–34)
MCHC RBC-ENTMCNC: 31.7 GM/DL — LOW (ref 32–36)
MCV RBC AUTO: 94.3 FL — SIGNIFICANT CHANGE UP (ref 80–100)
MONOCYTES # BLD AUTO: 0.62 K/UL — SIGNIFICANT CHANGE UP (ref 0–0.9)
MONOCYTES NFR BLD AUTO: 6.6 % — SIGNIFICANT CHANGE UP (ref 2–14)
NEUTROPHILS # BLD AUTO: 6.91 K/UL — SIGNIFICANT CHANGE UP (ref 1.8–7.4)
NEUTROPHILS NFR BLD AUTO: 73.5 % — SIGNIFICANT CHANGE UP (ref 43–77)
NITRITE UR-MCNC: NEGATIVE — SIGNIFICANT CHANGE UP
NRBC # BLD: 0 /100 WBCS — SIGNIFICANT CHANGE UP (ref 0–0)
NRBC BLD-RTO: 0 /100 WBCS — SIGNIFICANT CHANGE UP (ref 0–0)
PH UR: 7.5 — SIGNIFICANT CHANGE UP (ref 5–8)
PLATELET # BLD AUTO: 259 K/UL — SIGNIFICANT CHANGE UP (ref 150–400)
POTASSIUM SERPL-MCNC: 4 MMOL/L — SIGNIFICANT CHANGE UP (ref 3.5–5.3)
POTASSIUM SERPL-SCNC: 4 MMOL/L — SIGNIFICANT CHANGE UP (ref 3.5–5.3)
PROT SERPL-MCNC: 6.8 G/DL — SIGNIFICANT CHANGE UP (ref 6–8.3)
PROT UR-MCNC: NEGATIVE MG/DL — SIGNIFICANT CHANGE UP
RBC # BLD: 4.38 M/UL — SIGNIFICANT CHANGE UP (ref 3.8–5.2)
RBC # FLD: 16.6 % — HIGH (ref 10.3–14.5)
RBC CASTS # UR COMP ASSIST: 0 /HPF — SIGNIFICANT CHANGE UP (ref 0–4)
SODIUM SERPL-SCNC: 141 MMOL/L — SIGNIFICANT CHANGE UP (ref 135–145)
SP GR SPEC: 1.01 — SIGNIFICANT CHANGE UP (ref 1–1.03)
SQUAMOUS # UR AUTO: 6 /HPF — HIGH (ref 0–5)
T3 SERPL-MCNC: 88 NG/DL — SIGNIFICANT CHANGE UP (ref 80–200)
T4 AB SER-ACNC: 4.7 UG/DL — SIGNIFICANT CHANGE UP (ref 4.6–12)
T4 FREE SERPL-MCNC: 0.8 NG/DL — LOW (ref 0.9–1.8)
TROPONIN T, HIGH SENSITIVITY RESULT: 7 NG/L — SIGNIFICANT CHANGE UP (ref 0–51)
TSH SERPL-MCNC: 2.99 UIU/ML — SIGNIFICANT CHANGE UP (ref 0.27–4.2)
UROBILINOGEN FLD QL: 0.2 MG/DL — SIGNIFICANT CHANGE UP (ref 0.2–1)
WBC # BLD: 9.41 K/UL — SIGNIFICANT CHANGE UP (ref 3.8–10.5)
WBC # FLD AUTO: 9.41 K/UL — SIGNIFICANT CHANGE UP (ref 3.8–10.5)
WBC UR QL: 3 /HPF — SIGNIFICANT CHANGE UP (ref 0–5)

## 2024-07-25 PROCEDURE — 71045 X-RAY EXAM CHEST 1 VIEW: CPT | Mod: 26

## 2024-07-25 PROCEDURE — 99285 EMERGENCY DEPT VISIT HI MDM: CPT

## 2024-07-25 PROCEDURE — 84439 ASSAY OF FREE THYROXINE: CPT

## 2024-07-25 PROCEDURE — 84484 ASSAY OF TROPONIN QUANT: CPT

## 2024-07-25 PROCEDURE — 84480 ASSAY TRIIODOTHYRONINE (T3): CPT

## 2024-07-25 PROCEDURE — 36415 COLL VENOUS BLD VENIPUNCTURE: CPT

## 2024-07-25 PROCEDURE — 84443 ASSAY THYROID STIM HORMONE: CPT

## 2024-07-25 PROCEDURE — 99285 EMERGENCY DEPT VISIT HI MDM: CPT | Mod: 25

## 2024-07-25 PROCEDURE — 85025 COMPLETE CBC W/AUTO DIFF WBC: CPT

## 2024-07-25 PROCEDURE — 84702 CHORIONIC GONADOTROPIN TEST: CPT

## 2024-07-25 PROCEDURE — 80053 COMPREHEN METABOLIC PANEL: CPT

## 2024-07-25 PROCEDURE — 93005 ELECTROCARDIOGRAM TRACING: CPT

## 2024-07-25 PROCEDURE — 84436 ASSAY OF TOTAL THYROXINE: CPT

## 2024-07-25 PROCEDURE — 81001 URINALYSIS AUTO W/SCOPE: CPT

## 2024-07-25 PROCEDURE — 96374 THER/PROPH/DIAG INJ IV PUSH: CPT

## 2024-07-25 PROCEDURE — 71045 X-RAY EXAM CHEST 1 VIEW: CPT

## 2024-07-25 PROCEDURE — 87086 URINE CULTURE/COLONY COUNT: CPT

## 2024-07-25 RX ORDER — ACETAMINOPHEN 500 MG/5ML
1000 LIQUID (ML) ORAL ONCE
Refills: 0 | Status: COMPLETED | OUTPATIENT
Start: 2024-07-25 | End: 2024-07-25

## 2024-07-25 RX ADMIN — Medication 400 MILLIGRAM(S): at 08:02

## 2024-07-27 LAB
CULTURE RESULTS: SIGNIFICANT CHANGE UP
SPECIMEN SOURCE: SIGNIFICANT CHANGE UP

## 2024-07-29 ENCOUNTER — APPOINTMENT (OUTPATIENT)
Dept: NEUROSURGERY | Facility: CLINIC | Age: 44
End: 2024-07-29
Payer: SELF-PAY

## 2024-07-29 ENCOUNTER — NON-APPOINTMENT (OUTPATIENT)
Age: 44
End: 2024-07-29

## 2024-07-29 VITALS
BODY MASS INDEX: 26.68 KG/M2 | WEIGHT: 166 LBS | SYSTOLIC BLOOD PRESSURE: 105 MMHG | DIASTOLIC BLOOD PRESSURE: 67 MMHG | HEIGHT: 66 IN | OXYGEN SATURATION: 97 % | HEART RATE: 108 BPM

## 2024-07-29 PROCEDURE — 99024 POSTOP FOLLOW-UP VISIT: CPT

## 2024-07-29 RX ORDER — ACETAMINOPHEN 500 MG/1
500 TABLET ORAL
Refills: 0 | Status: ACTIVE | COMMUNITY

## 2024-07-29 NOTE — PHYSICAL EXAM
[Person] : oriented to person [Place] : oriented to place [Time] : oriented to time [Motor Tone] : muscle tone was normal in all four extremities [Abnormal Walk] : normal gait [Motor Strength] : muscle strength was normal in all four extremities

## 2024-07-29 NOTE — REASON FOR VISIT
[Family Member] : family member [de-identified] : Expanded endoscopic endonasal supra and extrasellar dissection and resection of a large eccentric pituitary nonfunctional macroadenoma [de-identified] : 7/11/2024

## 2024-07-29 NOTE — ASSESSMENT
[FreeTextEntry1] : IMPRESSION: 44F s/p Expanded endoscopic endonasal supra and extrasellar dissection and resection of a large pituitary macroadenoma, nonfunctional with extrasellar and lateral extension 7/11/2024.  Today, patient presents for her first post-op visit. Reports headaches/migraines (has history of migraines from years ago) with associated light and noise sensitivity. States there is improvement in left eye blurriness which is now occasional. Has appointment to see ophthalmologist next week. Scheduled to see ENT tomorrow. Denies leakage from nose or metallic taste.    PLAN: 6 week post-op visit with Dr. Goncalves ENT follow up scheduled tomorrow 7/30/24 Cardiology appointment later today - outside [facility] Endocrinology follow up on Friday

## 2024-07-30 ENCOUNTER — APPOINTMENT (OUTPATIENT)
Dept: OTOLARYNGOLOGY | Facility: CLINIC | Age: 44
End: 2024-07-30
Payer: SELF-PAY

## 2024-07-30 ENCOUNTER — APPOINTMENT (OUTPATIENT)
Dept: OTOLARYNGOLOGY | Facility: CLINIC | Age: 44
End: 2024-07-30

## 2024-07-30 VITALS
HEART RATE: 99 BPM | WEIGHT: 166 LBS | BODY MASS INDEX: 26.68 KG/M2 | SYSTOLIC BLOOD PRESSURE: 109 MMHG | HEIGHT: 66 IN | DIASTOLIC BLOOD PRESSURE: 78 MMHG

## 2024-07-30 DIAGNOSIS — D49.7 NEOPLASM OF UNSPECIFIED BEHAVIOR OF ENDOCRINE GLANDS AND OTHER PARTS OF NERVOUS SYSTEM: ICD-10-CM

## 2024-07-30 DIAGNOSIS — D35.2 BENIGN NEOPLASM OF PITUITARY GLAND: ICD-10-CM

## 2024-07-30 PROCEDURE — 99024 POSTOP FOLLOW-UP VISIT: CPT

## 2024-07-30 PROCEDURE — 31237 NSL/SINS NDSC SURG BX POLYPC: CPT

## 2024-07-30 NOTE — HISTORY OF PRESENT ILLNESS
[de-identified] : 44F presenting s/p TSR of pituitary macroadenoma with L extended NSF 7/11/24. She has been using saline sprays and gels daily. Vision has not yet changed dramatically from preop.

## 2024-07-30 NOTE — PLAN
[TextEntry] : Start saline irrigations BID and continue with gels/ointments.   Follow up in 1 month.

## 2024-08-15 ENCOUNTER — APPOINTMENT (OUTPATIENT)
Dept: OTOLARYNGOLOGY | Facility: CLINIC | Age: 44
End: 2024-08-15

## 2024-08-27 ENCOUNTER — APPOINTMENT (OUTPATIENT)
Dept: OTOLARYNGOLOGY | Facility: CLINIC | Age: 44
End: 2024-08-27
Payer: COMMERCIAL

## 2024-08-27 VITALS
DIASTOLIC BLOOD PRESSURE: 69 MMHG | HEART RATE: 17 BPM | WEIGHT: 169 LBS | HEIGHT: 66 IN | BODY MASS INDEX: 27.16 KG/M2 | SYSTOLIC BLOOD PRESSURE: 102 MMHG

## 2024-08-27 DIAGNOSIS — D35.2 BENIGN NEOPLASM OF PITUITARY GLAND: ICD-10-CM

## 2024-08-27 PROCEDURE — 99213 OFFICE O/P EST LOW 20 MIN: CPT | Mod: 25

## 2024-08-27 PROCEDURE — 31237 NSL/SINS NDSC SURG BX POLYPC: CPT | Mod: 50

## 2024-08-27 NOTE — HISTORY OF PRESENT ILLNESS
[de-identified] : Update 8/27/24: s/p TSR of pituitary macroadenoma with L extended NSF 7/11/24, using saline irrigations, gels/ointments multiple times per day. She also has been having some ear pressure and pain. She notices her palate and teeth are numb. Vision slowly improving.  44F presenting s/p TSR of pituitary macroadenoma with L extended NSF 7/11/24. She has been using saline sprays and gels daily. Vision has not yet changed dramatically from preop.

## 2024-08-27 NOTE — PROCEDURE
[FreeTextEntry6] : Nasal Endoscopy: Bilateral nasal cavity debridement (CPT 02613)   Indication: post op   Procedure: There was expected post operative inflammation in both the right and left nasal cavity.    Left: The septum is midline Flap donor site with crusting, removed with forceps, granulating nicely   Right: The septum is midline,  The inferior turbinate hypertrophic POsterior septectomy with edema and nasopore suctioned from sphenoid flap well healed and pink, adherent to skull base, no evidence of leak

## 2024-08-27 NOTE — PLAN
[TextEntry] : Continue saline irrigations, gels and ointments.   Cont fu with neuroopthalmology, endo, nsgy.   Follow up in 6-8 weeks.

## 2024-08-27 NOTE — HISTORY OF PRESENT ILLNESS
[de-identified] : Update 8/27/24: s/p TSR of pituitary macroadenoma with L extended NSF 7/11/24, using saline irrigations, gels/ointments multiple times per day. She also has been having some ear pressure and pain. She notices her palate and teeth are numb. Vision slowly improving.  44F presenting s/p TSR of pituitary macroadenoma with L extended NSF 7/11/24. She has been using saline sprays and gels daily. Vision has not yet changed dramatically from preop.

## 2024-08-27 NOTE — PROCEDURE
[FreeTextEntry6] : Nasal Endoscopy: Bilateral nasal cavity debridement (CPT 22164)   Indication: post op   Procedure: There was expected post operative inflammation in both the right and left nasal cavity.    Left: The septum is midline Flap donor site with crusting, removed with forceps, granulating nicely   Right: The septum is midline,  The inferior turbinate hypertrophic POsterior septectomy with edema and nasopore suctioned from sphenoid flap well healed and pink, adherent to skull base, no evidence of leak I'm having body aches

## 2024-08-29 ENCOUNTER — NON-APPOINTMENT (OUTPATIENT)
Age: 44
End: 2024-08-29

## 2024-08-30 ENCOUNTER — APPOINTMENT (OUTPATIENT)
Dept: NEUROSURGERY | Facility: CLINIC | Age: 44
End: 2024-08-30

## 2024-08-30 VITALS
BODY MASS INDEX: 27.16 KG/M2 | HEART RATE: 75 BPM | SYSTOLIC BLOOD PRESSURE: 110 MMHG | OXYGEN SATURATION: 96 % | HEIGHT: 66 IN | WEIGHT: 169 LBS | DIASTOLIC BLOOD PRESSURE: 79 MMHG

## 2024-08-30 DIAGNOSIS — Z83.49 FAMILY HISTORY OF OTHER ENDOCRINE, NUTRITIONAL AND METABOLIC DISEASES: ICD-10-CM

## 2024-08-30 DIAGNOSIS — Z86.39 PERSONAL HISTORY OF OTHER ENDOCRINE, NUTRITIONAL AND METABOLIC DISEASE: ICD-10-CM

## 2024-08-30 PROCEDURE — 99024 POSTOP FOLLOW-UP VISIT: CPT

## 2024-09-03 RX ORDER — HYDROCORTISONE 5 MG/1
5 TABLET ORAL
Refills: 0 | Status: ACTIVE | COMMUNITY

## 2024-09-03 RX ORDER — DESMOPRESSIN ACETATE 0.2 MG/1
0.2 TABLET ORAL
Refills: 0 | Status: ACTIVE | COMMUNITY

## 2024-09-03 NOTE — REVIEW OF SYSTEMS
[Feeling Tired] : feeling tired [As Noted in HPI] : as noted in HPI [Eyesight Problems] : eyesight problems [Negative] : Musculoskeletal [Suicidal] : not suicidal [Chest Pain] : no chest pain [Palpitations] : no palpitations [Shortness Of Breath] : no shortness of breath [Cough] : no cough [Abdominal Pain] : no abdominal pain [Vomiting] : no vomiting [Skin Lesions] : no skin lesions [Muscle Weakness] : no muscle weakness [Easy Bleeding] : no tendency for easy bleeding [Easy Bruising] : no tendency for easy bruising [FreeTextEntry8] : DI urinary frequency - following with Endo

## 2024-09-03 NOTE — PHYSICAL EXAM
[General Appearance - Alert] : alert [General Appearance - In No Acute Distress] : in no acute distress [Oriented To Time, Place, And Person] : oriented to person, place, and time [Impaired Insight] : insight and judgment were intact [Limited Balance] : balance was intact [Tremor] : no tremor present [Sclera] : the sclera and conjunctiva were normal [Hearing Threshold Finger Rub Not [place]] : hearing was normal [Neck Appearance] : the appearance of the neck was normal [] : no respiratory distress [Respiration, Rhythm And Depth] : normal respiratory rhythm and effort [Edema] : there was no peripheral edema [Abnormal Walk] : normal gait [Involuntary Movements] : no involuntary movements were seen [Motor Tone] : muscle strength and tone were normal [Skin Color & Pigmentation] : normal skin color and pigmentation

## 2024-09-03 NOTE — HISTORY OF PRESENT ILLNESS
[FreeTextEntry1] : ROSSY SPARROW is a 44 year old female with PMH of HTN, HLD, migraines. She presented with relatively recent history of sudden onset left vision loss.  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 The patient improved under a steroid treatment. There was no evidence of apoplexy in the tumor and we decided for semi-elective resection of this tumor through endoscopic endonasal approach. Prolactin was only mildly elevated and this tumor was clearly nonfunctional. We contemplated a second-stage lateral supraorbital craniotomy for this tumor in case the whole tumor cannot be resected through endonasal approach. Risks of surgery including stroke, hematoma, infection, CSF leak, meningitis, need for second procedure, remote risk of major neurological deficit, carotid artery injury, optic nerve injury were explained with the rationale for treatment. Informed consent was obtained.  On 7/11/24, she underwent Expanded endoscopic endonasal supra and extrasellar dissection and resection of a large pituitary macroadenoma, nonfunctional with extrasellar and lateral extension.   Of note on 7/25/24, she presented to [facility] ED with palpitations. Daughter states patient has follow up with cardiology later today.   8/30/24:  pt arrives for 2nd post op visit s/p 7/11/24 Expanded endoscopic endonasal supra and extrasellar dissection and resection of a large pituitary macroadenoma, nonfunctional with extrasellar and lateral extension. Pathology: benign pituitary adenoma.  Pt reports Left eye blurriness is improving.  Following with Endo for DI on desmopressin and hydrocortisone still feeling tired, but also improving slowly.  Has appt. with PCP Dr. Welch, and ENT Dr. De Santiago with nasal irrigation going well.   7/29/24: patient presents for her first post-op visit. Reports headaches/migraines (has history of migraines from years ago) with associated light and noise sensitivity. States there is improvement in left eye blurriness which is now occasional. Has appointment to see ophthalmologist next week. Scheduled to see ENT tomorrow. Denies leakage from nose or metallic taste.

## 2024-09-03 NOTE — ASSESSMENT
[FreeTextEntry1] :  2nd post op visit s/p 7/11/24 Expanded endoscopic endonasal supra and extrasellar dissection and resection of a large pituitary macroadenoma, nonfunctional with extrasellar and lateral extension. Pathology: benign pituitary adenoma.  Pt reports Left eye blurriness is improving.  Following with Endo for DI on desmopressin and hydrocortisone still feeling tired, but also improving slowly.  Has appt. with PCP Dr. Welch, and ENT Dr. De Santiago with nasal irrigation going well.  Denies drainage from nares  PLAN: MRI Sella protocol - 1 year  RTO 1 year  Neuroophthalmology referral provided continue f/u with Endocrine, PCP, and ENT

## 2024-10-08 ENCOUNTER — APPOINTMENT (OUTPATIENT)
Dept: OTOLARYNGOLOGY | Facility: CLINIC | Age: 44
End: 2024-10-08
Payer: COMMERCIAL

## 2024-10-08 VITALS
HEIGHT: 66 IN | DIASTOLIC BLOOD PRESSURE: 81 MMHG | WEIGHT: 169 LBS | HEART RATE: 81 BPM | BODY MASS INDEX: 27.16 KG/M2 | SYSTOLIC BLOOD PRESSURE: 121 MMHG

## 2024-10-08 DIAGNOSIS — D35.2 BENIGN NEOPLASM OF PITUITARY GLAND: ICD-10-CM

## 2024-10-08 PROCEDURE — 99213 OFFICE O/P EST LOW 20 MIN: CPT | Mod: 25

## 2024-10-08 PROCEDURE — 31231 NASAL ENDOSCOPY DX: CPT

## 2024-12-04 NOTE — PRE-ANESTHESIA EVALUATION ADULT - NSANTHAIRWAYFT_ENT_ALL_CORE
Addended by: SONIYA CARTER on: 12/4/2024 09:03 AM     Modules accepted: Orders     neck-from  prominent upper incisors

## 2025-05-13 NOTE — DISCHARGE NOTE PROVIDER - NSDCHC_MEDRECSTATUS_GEN_ALL_CORE
I called and assisted with rescheduling due to provider personal bookout. Pt verbalized understanding. //kah   Admission Reconciliation is Completed  Discharge Reconciliation is Not Complete Admission Reconciliation is Completed  Discharge Reconciliation is Completed

## 2025-07-30 ENCOUNTER — APPOINTMENT (OUTPATIENT)
Dept: MRI IMAGING | Facility: CLINIC | Age: 45
End: 2025-07-30

## (undated) DEVICE — STORZ DURA MICRO KNIFE INSERT SICKLE-SHAPED

## (undated) DEVICE — WARMING BLANKET LOWER ADULT

## (undated) DEVICE — DRAIN JACKSON PRATT 7MM FLAT FULL NO TROCAR

## (undated) DEVICE — Device

## (undated) DEVICE — BLADE MEDTRONIC ENT TRICUT ROTATABLE STRAIGHT 4MM X 11CM

## (undated) DEVICE — FOLEY TRAY 16FR LF URINE METER SURESTEP

## (undated) DEVICE — DRAPE 3/4 SHEET W REINFORCEMENT 56X77"

## (undated) DEVICE — GLV 8.5 PROTEXIS (WHITE)

## (undated) DEVICE — ELCTR BOVIE TIP NEEDLE INSULATED 2.8" EDGE

## (undated) DEVICE — APPLICATOR EXTENDED TIP 8CM

## (undated) DEVICE — MINI DOPPLER PROBE

## (undated) DEVICE — SPECIMEN CONTAINER 100ML

## (undated) DEVICE — SYR LUER LOK 10CC

## (undated) DEVICE — DRAPE CAMERA VIDEO 7"X96"

## (undated) DEVICE — DRAIN RESERVOIR FOR JACKSON PRATT 100CC CARDINAL

## (undated) DEVICE — DRSG MEROCEL STANDARD NO STRING 8CM X 2CM

## (undated) DEVICE — ELCTR BOVIE TIP BLADE INSULATED 2.75" EDGE

## (undated) DEVICE — APPLICATOR Q TIP 6" WOOD STEM

## (undated) DEVICE — STAPLER SKIN VISI-STAT 35 WIDE

## (undated) DEVICE — BUR MEDTRONIC ENT TRANSNASAL SKULL BASE DIAMOND ROUND 15 DEGREE 4.5MM X 13CM

## (undated) DEVICE — PREP BETADINE KIT

## (undated) DEVICE — ELCTR SUBDERMAL NDL 27G X 1/2" WITH TWISTED PAIR

## (undated) DEVICE — SUT VICRYL PLUS 3-0 18" X-1

## (undated) DEVICE — DRAPE INSTRUMENT POUCH 6.75" X 11"

## (undated) DEVICE — SUCTION COAGULATOR HAND CONTROL 10FR X 6"

## (undated) DEVICE — POSITIONER FOAM EGG CRATE ULNAR 2PCS (PINK)

## (undated) DEVICE — DRAPE TOWEL BLUE 17" X 24"

## (undated) DEVICE — PACK LUMBAR LAMI

## (undated) DEVICE — DRAPE SURGICAL #1010

## (undated) DEVICE — SOL ANTI FOG

## (undated) DEVICE — VENODYNE/SCD SLEEVE CALF MEDIUM

## (undated) DEVICE — BUR MEDTRONIC ENT TRANSNASAL SKULL BASE FLUTED 15 DEGREE 3.0MM X 13CM

## (undated) DEVICE — DRAPE MINOR PROCEDURE

## (undated) DEVICE — STORZ DURA MICRO KNIFE INSERT POINTED

## (undated) DEVICE — DRSG CURITY GAUZE SPONGE 4 X 4" 12-PLY

## (undated) DEVICE — DRSG TELFA 3 X 8

## (undated) DEVICE — NDL COUNTER DBL BLADEGUARD

## (undated) DEVICE — DRSG XEROFORM 1 X 8"

## (undated) DEVICE — DRSG STERISTRIPS 0.5 X 4"

## (undated) DEVICE — DRSG TAPE HYPAFIX 4"

## (undated) DEVICE — PREP CHLORAPREP HI-LITE ORANGE 10.5ML

## (undated) DEVICE — DRAPE MICROSCOPE ZEISS OPMI VISIONGUARD 154 X 52"

## (undated) DEVICE — ELCTR SUBDERMAL NDL CLASSIC 1.5M X 59" (6 COLOR)

## (undated) DEVICE — SPHERE MARKER (5 SPHERES)

## (undated) DEVICE — TUBING SUCTION 20FT

## (undated) DEVICE — DRAPE MAYO STAND 30"

## (undated) DEVICE — MARKING PEN W RULER

## (undated) DEVICE — ELCTR SUBDERMAL CORKSCREW NDL 1.2MM

## (undated) DEVICE — NDL SPINAL 25G X 3.5" (BLUE)

## (undated) DEVICE — DRAPE 1/2 SHEET 40X57"

## (undated) DEVICE — SOL IRR POUR H2O 250ML

## (undated) DEVICE — POSITIONER PATIENT SAFETY STRAP 3X60"

## (undated) DEVICE — ELCTR 4-DISC 20MM 49" (RED, BLUE, GREEN, BLACK)

## (undated) DEVICE — ELCTR BIPOLAR CORD AESCULAP 12FT DISP

## (undated) DEVICE — MEDICATION LABELS W MARKER

## (undated) DEVICE — VISITEC 4X4

## (undated) DEVICE — BIPOLAR FORCEP SYMMETRY BAYONET 7" X 1.5MM SMOOTH (SILVER)

## (undated) DEVICE — GOWN TRIMAX LG

## (undated) DEVICE — TUBING IRRIGATION NASAL BURR

## (undated) DEVICE — ELCTR GROUNDING PAD ADULT COVIDIEN

## (undated) DEVICE — CLEANING SHEATH ENDO-SCRUB FOR STORZ 7210AA TELESCOPE 4MM 0 DEGREE

## (undated) DEVICE — SUT CHROMIC GUT 4-0 18" P-13

## (undated) DEVICE — SOL IRR POUR NS 0.9% 500ML

## (undated) DEVICE — MIDAS REX MR8 CLEARVIEW TN BALL 4.5MM X 13CM COARSE

## (undated) DEVICE — IRRIGATION TRAY W PISTON SYRINGE 60ML